# Patient Record
Sex: FEMALE | Race: WHITE | NOT HISPANIC OR LATINO | Employment: OTHER | URBAN - METROPOLITAN AREA
[De-identification: names, ages, dates, MRNs, and addresses within clinical notes are randomized per-mention and may not be internally consistent; named-entity substitution may affect disease eponyms.]

---

## 2017-01-06 ENCOUNTER — HOSPITAL ENCOUNTER (OUTPATIENT)
Dept: CARDIOLOGY | Facility: HOSPITAL | Age: 62
Setting detail: RECURRING SERIES
Discharge: HOME | End: 2017-01-06

## 2017-01-06 PROCEDURE — 36416 COLLJ CAPILLARY BLOOD SPEC: CPT | Performed by: INTERNAL MEDICINE

## 2017-01-06 PROCEDURE — 85610 PROTHROMBIN TIME: CPT | Performed by: INTERNAL MEDICINE

## 2017-01-13 ENCOUNTER — HOSPITAL ENCOUNTER (OUTPATIENT)
Dept: CARDIOLOGY | Facility: HOSPITAL | Age: 62
Setting detail: RECURRING SERIES
Discharge: HOME | End: 2017-01-13

## 2017-01-13 PROCEDURE — 36416 COLLJ CAPILLARY BLOOD SPEC: CPT | Performed by: INTERNAL MEDICINE

## 2017-01-13 PROCEDURE — 85610 PROTHROMBIN TIME: CPT | Performed by: INTERNAL MEDICINE

## 2017-02-03 ENCOUNTER — HOSPITAL ENCOUNTER (OUTPATIENT)
Dept: CARDIOLOGY | Facility: HOSPITAL | Age: 62
Setting detail: RECURRING SERIES
Discharge: HOME | End: 2017-02-03

## 2017-02-03 PROCEDURE — 36416 COLLJ CAPILLARY BLOOD SPEC: CPT

## 2017-02-03 PROCEDURE — 85610 PROTHROMBIN TIME: CPT

## 2017-02-10 ENCOUNTER — HOSPITAL ENCOUNTER (OUTPATIENT)
Dept: CARDIOLOGY | Facility: HOSPITAL | Age: 62
Setting detail: RECURRING SERIES
Discharge: HOME | End: 2017-02-10

## 2017-02-10 PROCEDURE — 85610 PROTHROMBIN TIME: CPT

## 2017-02-10 PROCEDURE — 36416 COLLJ CAPILLARY BLOOD SPEC: CPT

## 2017-02-24 ENCOUNTER — HOSPITAL ENCOUNTER (OUTPATIENT)
Dept: CARDIOLOGY | Facility: HOSPITAL | Age: 62
Setting detail: RECURRING SERIES
Discharge: HOME | End: 2017-02-24

## 2017-02-24 PROCEDURE — 85610 PROTHROMBIN TIME: CPT

## 2017-02-24 PROCEDURE — 36416 COLLJ CAPILLARY BLOOD SPEC: CPT

## 2017-03-10 ENCOUNTER — HOSPITAL ENCOUNTER (OUTPATIENT)
Dept: CARDIOLOGY | Facility: HOSPITAL | Age: 62
Setting detail: RECURRING SERIES
Discharge: HOME | End: 2017-03-10

## 2017-03-10 PROCEDURE — 36416 COLLJ CAPILLARY BLOOD SPEC: CPT

## 2017-03-10 PROCEDURE — 85610 PROTHROMBIN TIME: CPT

## 2017-03-16 RX ORDER — WARFARIN SODIUM 5 MG/1
TABLET ORAL
Qty: 180 TABLET | Refills: 0 | Status: SHIPPED | OUTPATIENT
Start: 2017-03-16 | End: 2017-07-06 | Stop reason: SDUPTHER

## 2017-03-24 ENCOUNTER — HOSPITAL ENCOUNTER (OUTPATIENT)
Dept: CARDIOLOGY | Facility: HOSPITAL | Age: 62
Setting detail: RECURRING SERIES
Discharge: HOME | End: 2017-03-24

## 2017-03-24 PROCEDURE — 36416 COLLJ CAPILLARY BLOOD SPEC: CPT

## 2017-03-24 PROCEDURE — 85610 PROTHROMBIN TIME: CPT

## 2017-04-17 RX ORDER — WARFARIN SODIUM 5 MG/1
TABLET ORAL
Qty: 60 TABLET | Refills: 0 | OUTPATIENT
Start: 2017-04-17

## 2017-04-20 ENCOUNTER — HOSPITAL ENCOUNTER (OUTPATIENT)
Dept: CARDIOLOGY | Facility: HOSPITAL | Age: 62
Setting detail: RECURRING SERIES
Discharge: HOME | End: 2017-04-20

## 2017-04-20 PROCEDURE — 36416 COLLJ CAPILLARY BLOOD SPEC: CPT

## 2017-04-20 PROCEDURE — 85610 PROTHROMBIN TIME: CPT

## 2017-05-19 ENCOUNTER — HOSPITAL ENCOUNTER (OUTPATIENT)
Dept: CARDIOLOGY | Facility: HOSPITAL | Age: 62
Setting detail: RECURRING SERIES
Discharge: HOME | End: 2017-05-19

## 2017-05-19 PROCEDURE — 36416 COLLJ CAPILLARY BLOOD SPEC: CPT

## 2017-05-19 PROCEDURE — 85610 PROTHROMBIN TIME: CPT

## 2017-05-26 ENCOUNTER — HOSPITAL ENCOUNTER (OUTPATIENT)
Dept: CARDIOLOGY | Facility: HOSPITAL | Age: 62
Setting detail: RECURRING SERIES
Discharge: HOME | End: 2017-05-26

## 2017-05-26 PROCEDURE — 85610 PROTHROMBIN TIME: CPT

## 2017-05-26 PROCEDURE — 36416 COLLJ CAPILLARY BLOOD SPEC: CPT

## 2017-06-23 ENCOUNTER — HOSPITAL ENCOUNTER (OUTPATIENT)
Dept: CARDIOLOGY | Facility: HOSPITAL | Age: 62
Setting detail: RECURRING SERIES
Discharge: HOME | End: 2017-06-23

## 2017-06-23 PROCEDURE — 36416 COLLJ CAPILLARY BLOOD SPEC: CPT

## 2017-06-23 PROCEDURE — 85610 PROTHROMBIN TIME: CPT

## 2017-06-26 PROCEDURE — 85610 PROTHROMBIN TIME: CPT

## 2017-06-26 PROCEDURE — 36416 COLLJ CAPILLARY BLOOD SPEC: CPT

## 2017-07-07 RX ORDER — WARFARIN SODIUM 5 MG/1
TABLET ORAL
Qty: 60 TABLET | Refills: 3 | Status: SHIPPED | OUTPATIENT
Start: 2017-07-07 | End: 2018-03-04

## 2017-07-20 ENCOUNTER — HOSPITAL ENCOUNTER (OUTPATIENT)
Dept: CARDIOLOGY | Facility: HOSPITAL | Age: 62
Setting detail: RECURRING SERIES
Discharge: HOME | End: 2017-07-20

## 2017-07-20 PROCEDURE — 36416 COLLJ CAPILLARY BLOOD SPEC: CPT

## 2017-07-20 PROCEDURE — 85610 PROTHROMBIN TIME: CPT

## 2017-07-27 ENCOUNTER — HOSPITAL ENCOUNTER (OUTPATIENT)
Dept: CARDIOLOGY | Facility: HOSPITAL | Age: 62
Setting detail: RECURRING SERIES
Discharge: HOME | End: 2017-07-27

## 2017-07-27 PROCEDURE — 36416 COLLJ CAPILLARY BLOOD SPEC: CPT

## 2017-07-27 PROCEDURE — 85610 PROTHROMBIN TIME: CPT

## 2017-08-11 ENCOUNTER — HOSPITAL ENCOUNTER (OUTPATIENT)
Dept: CARDIOLOGY | Facility: HOSPITAL | Age: 62
Setting detail: RECURRING SERIES
Discharge: HOME | End: 2017-08-11

## 2017-08-11 PROCEDURE — 36416 COLLJ CAPILLARY BLOOD SPEC: CPT

## 2017-08-11 PROCEDURE — 85610 PROTHROMBIN TIME: CPT

## 2017-09-11 ENCOUNTER — HOSPITAL ENCOUNTER (OUTPATIENT)
Dept: CARDIOLOGY | Facility: HOSPITAL | Age: 62
Setting detail: RECURRING SERIES
Discharge: HOME | End: 2017-09-11

## 2017-09-11 PROCEDURE — 36416 COLLJ CAPILLARY BLOOD SPEC: CPT

## 2017-09-11 PROCEDURE — 85610 PROTHROMBIN TIME: CPT

## 2017-10-09 ENCOUNTER — HOSPITAL ENCOUNTER (OUTPATIENT)
Dept: CARDIOLOGY | Facility: HOSPITAL | Age: 62
Setting detail: RECURRING SERIES
Discharge: HOME | End: 2017-10-09

## 2017-10-09 PROCEDURE — 85610 PROTHROMBIN TIME: CPT

## 2017-10-09 PROCEDURE — 36416 COLLJ CAPILLARY BLOOD SPEC: CPT

## 2017-11-06 ENCOUNTER — HOSPITAL ENCOUNTER (OUTPATIENT)
Dept: CARDIOLOGY | Facility: HOSPITAL | Age: 62
Setting detail: RECURRING SERIES
Discharge: HOME | End: 2017-11-06

## 2017-11-06 PROCEDURE — 85610 PROTHROMBIN TIME: CPT

## 2017-11-06 PROCEDURE — 36416 COLLJ CAPILLARY BLOOD SPEC: CPT

## 2017-12-05 ENCOUNTER — HOSPITAL ENCOUNTER (OUTPATIENT)
Dept: CARDIOLOGY | Facility: HOSPITAL | Age: 62
Setting detail: RECURRING SERIES
Discharge: HOME | End: 2017-12-05

## 2017-12-05 PROCEDURE — 85610 PROTHROMBIN TIME: CPT

## 2017-12-05 PROCEDURE — 36416 COLLJ CAPILLARY BLOOD SPEC: CPT

## 2018-01-05 ENCOUNTER — HOSPITAL ENCOUNTER (OUTPATIENT)
Dept: CARDIOLOGY | Facility: HOSPITAL | Age: 63
Setting detail: RECURRING SERIES
Discharge: HOME | End: 2018-01-05

## 2018-01-05 PROCEDURE — 36416 COLLJ CAPILLARY BLOOD SPEC: CPT

## 2018-01-05 PROCEDURE — 85610 PROTHROMBIN TIME: CPT

## 2018-01-19 ENCOUNTER — HOSPITAL ENCOUNTER (OUTPATIENT)
Dept: CARDIOLOGY | Facility: HOSPITAL | Age: 63
Setting detail: RECURRING SERIES
Discharge: HOME | End: 2018-01-19

## 2018-01-19 PROCEDURE — 36416 COLLJ CAPILLARY BLOOD SPEC: CPT

## 2018-01-19 PROCEDURE — 85610 PROTHROMBIN TIME: CPT

## 2018-02-15 ENCOUNTER — HOSPITAL ENCOUNTER (OUTPATIENT)
Dept: CARDIOLOGY | Facility: HOSPITAL | Age: 63
Setting detail: RECURRING SERIES
Discharge: HOME | End: 2018-02-15

## 2018-02-15 PROCEDURE — 85610 PROTHROMBIN TIME: CPT

## 2018-02-15 PROCEDURE — 36416 COLLJ CAPILLARY BLOOD SPEC: CPT

## 2018-02-22 ENCOUNTER — HOSPITAL ENCOUNTER (OUTPATIENT)
Dept: CARDIOLOGY | Facility: HOSPITAL | Age: 63
Setting detail: RECURRING SERIES
Discharge: HOME | End: 2018-02-22

## 2018-02-22 PROCEDURE — 85610 PROTHROMBIN TIME: CPT

## 2018-02-22 PROCEDURE — 36416 COLLJ CAPILLARY BLOOD SPEC: CPT

## 2018-03-02 ENCOUNTER — HOSPITAL ENCOUNTER (OUTPATIENT)
Dept: CARDIOLOGY | Facility: HOSPITAL | Age: 63
Setting detail: RECURRING SERIES
Discharge: HOME | End: 2018-03-02

## 2018-03-02 PROCEDURE — 85610 PROTHROMBIN TIME: CPT

## 2018-03-02 PROCEDURE — 36416 COLLJ CAPILLARY BLOOD SPEC: CPT

## 2018-03-04 ENCOUNTER — HOSPITAL ENCOUNTER (INPATIENT)
Facility: HOSPITAL | Age: 63
LOS: 4 days | Discharge: HOME | End: 2018-03-08
Attending: EMERGENCY MEDICINE | Admitting: INTERNAL MEDICINE

## 2018-03-04 ENCOUNTER — APPOINTMENT (OUTPATIENT)
Dept: GENERAL RADIOLOGY | Facility: HOSPITAL | Age: 63
End: 2018-03-04

## 2018-03-04 DIAGNOSIS — I48.91 ATRIAL FIBRILLATION WITH RAPID VENTRICULAR RESPONSE (HCC): Primary | ICD-10-CM

## 2018-03-04 DIAGNOSIS — I50.9 ACUTE ON CHRONIC CONGESTIVE HEART FAILURE, UNSPECIFIED CONGESTIVE HEART FAILURE TYPE: ICD-10-CM

## 2018-03-04 LAB
ALBUMIN SERPL-MCNC: 3.9 G/DL (ref 3.5–5.2)
ALBUMIN/GLOB SERPL: 1.4 G/DL
ALP SERPL-CCNC: 76 U/L (ref 39–117)
ALT SERPL W P-5'-P-CCNC: 24 U/L (ref 1–33)
ANION GAP SERPL CALCULATED.3IONS-SCNC: 15.3 MMOL/L
APTT PPP: 38.5 SECONDS (ref 22.7–35.4)
AST SERPL-CCNC: 34 U/L (ref 1–32)
BASOPHILS # BLD AUTO: 0.03 10*3/MM3 (ref 0–0.2)
BASOPHILS NFR BLD AUTO: 0.2 % (ref 0–1.5)
BILIRUB SERPL-MCNC: 1.3 MG/DL (ref 0.1–1.2)
BUN BLD-MCNC: 20 MG/DL (ref 8–23)
BUN/CREAT SERPL: 16.5 (ref 7–25)
CALCIUM SPEC-SCNC: 9.1 MG/DL (ref 8.6–10.5)
CHLORIDE SERPL-SCNC: 104 MMOL/L (ref 98–107)
CO2 SERPL-SCNC: 21.7 MMOL/L (ref 22–29)
CREAT BLD-MCNC: 1.21 MG/DL (ref 0.57–1)
DEPRECATED RDW RBC AUTO: 49.5 FL (ref 37–54)
DIGOXIN SERPL-MCNC: <0.3 NG/ML (ref 0.6–1.2)
EOSINOPHIL # BLD AUTO: 0.02 10*3/MM3 (ref 0–0.7)
EOSINOPHIL NFR BLD AUTO: 0.2 % (ref 0.3–6.2)
ERYTHROCYTE [DISTWIDTH] IN BLOOD BY AUTOMATED COUNT: 14.4 % (ref 11.7–13)
GFR SERPL CREATININE-BSD FRML MDRD: 45 ML/MIN/1.73
GLOBULIN UR ELPH-MCNC: 2.7 GM/DL
GLUCOSE BLD-MCNC: 131 MG/DL (ref 65–99)
GLUCOSE BLDC GLUCOMTR-MCNC: 112 MG/DL (ref 70–130)
HCT VFR BLD AUTO: 43 % (ref 35.6–45.5)
HGB BLD-MCNC: 13.5 G/DL (ref 11.9–15.5)
HOLD SPECIMEN: NORMAL
HOLD SPECIMEN: NORMAL
IMM GRANULOCYTES # BLD: 0.03 10*3/MM3 (ref 0–0.03)
IMM GRANULOCYTES NFR BLD: 0.2 % (ref 0–0.5)
INR PPP: 2.77 (ref 0.9–1.1)
LYMPHOCYTES # BLD AUTO: 2.43 10*3/MM3 (ref 0.9–4.8)
LYMPHOCYTES NFR BLD AUTO: 19.2 % (ref 19.6–45.3)
MCH RBC QN AUTO: 29.7 PG (ref 26.9–32)
MCHC RBC AUTO-ENTMCNC: 31.4 G/DL (ref 32.4–36.3)
MCV RBC AUTO: 94.7 FL (ref 80.5–98.2)
MONOCYTES # BLD AUTO: 0.92 10*3/MM3 (ref 0.2–1.2)
MONOCYTES NFR BLD AUTO: 7.3 % (ref 5–12)
NEUTROPHILS # BLD AUTO: 9.23 10*3/MM3 (ref 1.9–8.1)
NEUTROPHILS NFR BLD AUTO: 72.9 % (ref 42.7–76)
NT-PROBNP SERPL-MCNC: ABNORMAL PG/ML (ref 5–900)
PLATELET # BLD AUTO: 246 10*3/MM3 (ref 140–500)
PMV BLD AUTO: 12.7 FL (ref 6–12)
POTASSIUM BLD-SCNC: 4.2 MMOL/L (ref 3.5–5.2)
PROT SERPL-MCNC: 6.6 G/DL (ref 6–8.5)
PROTHROMBIN TIME: 28.9 SECONDS (ref 11.7–14.2)
RBC # BLD AUTO: 4.54 10*6/MM3 (ref 3.9–5.2)
SODIUM BLD-SCNC: 141 MMOL/L (ref 136–145)
TROPONIN T SERPL-MCNC: <0.01 NG/ML (ref 0–0.03)
WBC NRBC COR # BLD: 12.66 10*3/MM3 (ref 4.5–10.7)
WHOLE BLOOD HOLD SPECIMEN: NORMAL
WHOLE BLOOD HOLD SPECIMEN: NORMAL

## 2018-03-04 PROCEDURE — 85610 PROTHROMBIN TIME: CPT | Performed by: EMERGENCY MEDICINE

## 2018-03-04 PROCEDURE — 93010 ELECTROCARDIOGRAM REPORT: CPT | Performed by: INTERNAL MEDICINE

## 2018-03-04 PROCEDURE — 80162 ASSAY OF DIGOXIN TOTAL: CPT | Performed by: INTERNAL MEDICINE

## 2018-03-04 PROCEDURE — 93005 ELECTROCARDIOGRAM TRACING: CPT | Performed by: EMERGENCY MEDICINE

## 2018-03-04 PROCEDURE — 25010000002 DIGOXIN PER 500 MCG: Performed by: INTERNAL MEDICINE

## 2018-03-04 PROCEDURE — 85025 COMPLETE CBC W/AUTO DIFF WBC: CPT | Performed by: EMERGENCY MEDICINE

## 2018-03-04 PROCEDURE — 83880 ASSAY OF NATRIURETIC PEPTIDE: CPT | Performed by: EMERGENCY MEDICINE

## 2018-03-04 PROCEDURE — 84484 ASSAY OF TROPONIN QUANT: CPT | Performed by: EMERGENCY MEDICINE

## 2018-03-04 PROCEDURE — 85730 THROMBOPLASTIN TIME PARTIAL: CPT | Performed by: EMERGENCY MEDICINE

## 2018-03-04 PROCEDURE — 82962 GLUCOSE BLOOD TEST: CPT

## 2018-03-04 PROCEDURE — 99285 EMERGENCY DEPT VISIT HI MDM: CPT

## 2018-03-04 PROCEDURE — 71045 X-RAY EXAM CHEST 1 VIEW: CPT

## 2018-03-04 PROCEDURE — 80053 COMPREHEN METABOLIC PANEL: CPT | Performed by: EMERGENCY MEDICINE

## 2018-03-04 RX ORDER — DIGOXIN 0.25 MG/ML
250 INJECTION INTRAMUSCULAR; INTRAVENOUS ONCE
Status: COMPLETED | OUTPATIENT
Start: 2018-03-04 | End: 2018-03-04

## 2018-03-04 RX ORDER — LABETALOL HYDROCHLORIDE 5 MG/ML
10 INJECTION, SOLUTION INTRAVENOUS ONCE
Status: COMPLETED | OUTPATIENT
Start: 2018-03-04 | End: 2018-03-04

## 2018-03-04 RX ORDER — DIGOXIN 125 MCG
0.12 TABLET ORAL
Status: DISCONTINUED | OUTPATIENT
Start: 2018-03-05 | End: 2018-03-08 | Stop reason: HOSPADM

## 2018-03-04 RX ORDER — SODIUM CHLORIDE 0.9 % (FLUSH) 0.9 %
1-10 SYRINGE (ML) INJECTION AS NEEDED
Status: DISCONTINUED | OUTPATIENT
Start: 2018-03-04 | End: 2018-03-08 | Stop reason: HOSPADM

## 2018-03-04 RX ORDER — WARFARIN SODIUM 7.5 MG/1
7.5 TABLET ORAL
Status: DISCONTINUED | OUTPATIENT
Start: 2018-03-04 | End: 2018-03-05

## 2018-03-04 RX ORDER — WARFARIN SODIUM 5 MG/1
5 TABLET ORAL
COMMUNITY
End: 2018-07-30 | Stop reason: SDUPTHER

## 2018-03-04 RX ORDER — SODIUM CHLORIDE 0.9 % (FLUSH) 0.9 %
10 SYRINGE (ML) INJECTION AS NEEDED
Status: DISCONTINUED | OUTPATIENT
Start: 2018-03-04 | End: 2018-03-08 | Stop reason: HOSPADM

## 2018-03-04 RX ORDER — CARVEDILOL 3.12 MG/1
3.12 TABLET ORAL EVERY 12 HOURS SCHEDULED
Status: DISCONTINUED | OUTPATIENT
Start: 2018-03-04 | End: 2018-03-08 | Stop reason: HOSPADM

## 2018-03-04 RX ORDER — BUMETANIDE 0.25 MG/ML
0.5 INJECTION INTRAMUSCULAR; INTRAVENOUS ONCE
Status: COMPLETED | OUTPATIENT
Start: 2018-03-04 | End: 2018-03-04

## 2018-03-04 RX ADMIN — BUMETANIDE 0.5 MG: 0.25 INJECTION INTRAMUSCULAR; INTRAVENOUS at 22:25

## 2018-03-04 RX ADMIN — LABETALOL HYDROCHLORIDE 20 MG: 5 INJECTION, SOLUTION INTRAVENOUS at 18:42

## 2018-03-04 RX ADMIN — SODIUM CHLORIDE 1000 ML: 9 INJECTION, SOLUTION INTRAVENOUS at 18:52

## 2018-03-04 RX ADMIN — DIGOXIN 250 MCG: 0.25 INJECTION INTRAMUSCULAR; INTRAVENOUS at 20:28

## 2018-03-04 RX ADMIN — CARVEDILOL 3.12 MG: 3.12 TABLET, FILM COATED ORAL at 22:25

## 2018-03-04 NOTE — ED PROVIDER NOTES
EMERGENCY DEPARTMENT ENCOUNTER    CHIEF COMPLAINT  Chief Complaint: rapid heart rate  History given by: patient  History limited by:  nothing  Room Number: 324/1  PMD: No Known Provider  Cardiology: Dr. Vargas    HPI:  Pt is a 62 y.o. female who presents complaining of rapid heart rate, SOA, and fatigue that began earlier today. She states that she also had an episode of rapid heart rate two weeks ago but that she felt that it resolved. However, she states that she has been very fatigued since then. She denies nausea, vomiting, and chest pain. Pt had an artifical mechanical valve placed emergently in 2015 and has been taking Warfarin ever since. She states that her symptoms today feel similar to this episode in 2015.  At presentation, Pt's TM=[id number].    Duration:  One day  Onset:  gradual  Timing:  constant  Location:  n/a  Radiation:  n/a  Quality:  GU=369  Intensity/Severity:  severe  Progression: worsening  Associated Symptoms:  SOA, fatigue  Aggravating Factors:  none  Alleviating Factors:  None  Previous Episodes:   Pt had an artifical mechanical valve placed emergently in 2015.  Pt has been taking Warfarin ever since.  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     Resolved Ambulatory Problems     Diagnosis Date Noted   • No Resolved Ambulatory Problems     Past Medical History:   Diagnosis Date   • Acute bronchitis    • Acute kidney injury    • Cachexia    • Depression    • H/O shortness of breath    • Mitral valve stenosis    • Pneumothorax    • Pulmonary hypertension    • Rheumatic fever    • Sinus tachycardia        PAST SURGICAL HISTORY  Past Surgical History:   Procedure Laterality Date   • CARDIAC CATHETERIZATION      procedure outcome: successful   • FOOT SURGERY     • MITRAL VALVE REPLACEMENT  06/2015    Subhash Márquez   • TONSILLECTOMY         FAMILY HISTORY  Family History   Problem Relation Age of  Onset   • Heart failure Mother      congestive   • Cancer Father    • Atrial fibrillation Sister    • Atrial fibrillation Brother    • Heart disease Brother      cardiac pacemaker       SOCIAL HISTORY  Social History     Social History   • Marital status: Single     Spouse name: N/A   • Number of children: N/A   • Years of education: N/A     Occupational History   • Not on file.     Social History Main Topics   • Smoking status: Former Smoker     Quit date: 2/18/2018   • Smokeless tobacco: Not on file   • Alcohol use No   • Drug use: No   • Sexual activity: Not on file     Other Topics Concern   • Not on file     Social History Narrative   • No narrative on file       ALLERGIES  Review of patient's allergies indicates no known allergies.    REVIEW OF SYSTEMS  Review of Systems   Constitutional: Positive for fatigue. Negative for fever.   HENT: Negative for sore throat.    Eyes: Negative.    Respiratory: Positive for shortness of breath. Negative for cough.    Cardiovascular: Negative for chest pain.        DR=[id number]   Gastrointestinal: Negative for abdominal pain, diarrhea and vomiting.   Genitourinary: Negative for dysuria.   Musculoskeletal: Negative for neck pain.   Skin: Negative for rash.   Allergic/Immunologic: Negative.    Neurological: Negative for weakness, numbness and headaches.   Hematological: Negative.    Psychiatric/Behavioral: Negative.    All other systems reviewed and are negative.      PHYSICAL EXAM  ED Triage Vitals   Temp Heart Rate Resp BP SpO2   03/04/18 1817 03/04/18 1808 03/04/18 1808 03/04/18 1818 03/04/18 1808   97.5 °F (36.4 °C) 160 20 130/88 98 %      Temp src Heart Rate Source Patient Position BP Location FiO2 (%)   03/04/18 1817 03/04/18 1808 03/04/18 1818 03/04/18 1818 --   Oral Monitor Lying Right arm        Physical Exam   Constitutional: She is oriented to person, place, and time and well-developed, well-nourished, and in no distress. No distress.   HENT:   Head: Normocephalic and  atraumatic.   Eyes: EOM are normal. Pupils are equal, round, and reactive to light.   Neck: Normal range of motion. Neck supple.   Cardiovascular: Normal heart sounds.  An irregularly irregular rhythm present. Tachycardia present.    KM=191   Pulmonary/Chest: Effort normal. No respiratory distress. She has rhonchi in the right lower field and the left lower field.   Abdominal: Soft. There is no tenderness. There is no rebound and no guarding.   Musculoskeletal: Normal range of motion. She exhibits no edema.   Neurological: She is alert and oriented to person, place, and time. She has normal sensation and normal strength.   Skin: Skin is warm and dry. No rash noted.   Psychiatric: Mood and affect normal.   Nursing note and vitals reviewed.      LAB RESULTS  Lab Results (last 24 hours)     Procedure Component Value Units Date/Time    aPTT [[id number]]  (Abnormal) Collected:  03/04/18 1831    Specimen:  Blood Updated:  03/04/18 1912     PTT 38.5 (H) seconds     BNP [[id number]]  (Abnormal) Collected:  03/04/18 1831    Specimen:  Blood Updated:  03/04/18 1909     proBNP 12410.0 (H) pg/mL     Narrative:       Among patients with dyspnea, NT-proBNP is highly sensitive for the detection of acute congestive heart failure. In addition NT-proBNP of <300 pg/ml effectively rules out acute congestive heart failure with 99% negative predictive value.    Comprehensive Metabolic Panel [[id number]]  (Abnormal) Collected:  03/04/18 1831    Specimen:  Blood Updated:  03/04/18 1911     Glucose 131 (H) mg/dL      BUN 20 mg/dL      Creatinine 1.21 (H) mg/dL      Sodium 141 mmol/L      Potassium 4.2 mmol/L      Chloride 104 mmol/L      CO2 21.7 (L) mmol/L      Calcium 9.1 mg/dL      Total Protein 6.6 g/dL      Albumin 3.90 g/dL      ALT (SGPT) 24 U/L      AST (SGOT) 34 (H) U/L      Alkaline Phosphatase 76 U/L      Total Bilirubin 1.3 (H) mg/dL      eGFR Non African Amer 45 (L) mL/min/1.73      Globulin 2.7 gm/dL      A/G Ratio 1.4 g/dL       BUN/Creatinine Ratio 16.5     Anion Gap 15.3 mmol/L     Protime-INR [[id number]]  (Abnormal) Collected:  03/04/18 1831    Specimen:  Blood Updated:  03/04/18 1912     Protime 28.9 (H) Seconds      INR 2.77 (H)    Troponin [[id number]]  (Normal) Collected:  03/04/18 1831    Specimen:  Blood Updated:  03/04/18 1911     Troponin T <0.010 ng/mL     Narrative:       Troponin T Reference Ranges:  Less than 0.03 ng/mL:    Negative for AMI  0.03 to 0.09 ng/mL:      Indeterminant for AMI  Greater than 0.09 ng/mL: Positive for AMI    CBC Auto Differential [[id number]]  (Abnormal) Collected:  03/04/18 1831    Specimen:  Blood Updated:  03/04/18 1909     WBC 12.66 (H) 10*3/mm3      RBC 4.54 10*6/mm3      Hemoglobin 13.5 g/dL      Hematocrit 43.0 %      MCV 94.7 fL      MCH 29.7 pg      MCHC 31.4 (L) g/dL      RDW 14.4 (H) %      RDW-SD 49.5 fl      MPV 12.7 (H) fL      Platelets 246 10*3/mm3      Neutrophil % 72.9 %      Lymphocyte % 19.2 (L) %      Monocyte % 7.3 %      Eosinophil % 0.2 (L) %      Basophil % 0.2 %      Immature Grans % 0.2 %      Neutrophils, Absolute 9.23 (H) 10*3/mm3      Lymphocytes, Absolute 2.43 10*3/mm3      Monocytes, Absolute 0.92 10*3/mm3      Eosinophils, Absolute 0.02 10*3/mm3      Basophils, Absolute 0.03 10*3/mm3      Immature Grans, Absolute 0.03 10*3/mm3     Digoxin Level [[id number]]  (Abnormal) Collected:  03/04/18 1831    Specimen:  Blood Updated:  03/04/18 2052     Digoxin <0.30 (L) ng/mL     POC Glucose Once [[id number]]  (Normal) Collected:  03/04/18 2102    Specimen:  Blood Updated:  03/04/18 2103     Glucose 112 mg/dL     Narrative:       Meter: [id number] : [id number] Kike Hercules I ordered the above labs and reviewed the results    RADIOLOGY  XR Chest 1 View            Reviewed CXR which shows a single portable view of the chest demonstrates moderate to severe cardiomegaly. Bibasilar atelectasis / infiltrate and pleural fluid collections are appreciated with the pleural fluid  being more prominent on the left. There is mild cephalization of the pulmonary vasculature. Independently viewed by me. Interpreted by radiologist.     I ordered the above noted radiological studies. Interpreted by radiologist. Reviewed by me in PACS.       PROCEDURES  Critical Care  Performed by: HARMAN MAHONEY  Authorized by: HARMAN MAHONEY     Critical care provider statement:     Critical care time (minutes):  35    Critical care end time:  3/4/2018 8:15 PM    Critical care time was exclusive of:  Separately billable procedures and treating other patients    Critical care was necessary to treat or prevent imminent or life-threatening deterioration of the following conditions:  Cardiac failure    Critical care was time spent personally by me on the following activities:  Development of treatment plan with patient or surrogate, discussions with consultants, evaluation of patient's response to treatment, examination of patient, obtaining history from patient or surrogate, ordering and performing treatments and interventions, ordering and review of laboratory studies, ordering and review of radiographic studies, pulse oximetry, re-evaluation of patient's condition and review of old charts        EKG           EKG time: 1813  Rhythm/Rate: 197, a-fib with RVR  P waves and NH: absent  QRS, axis: normal   ST and T waves: normal     Interpreted Contemporaneously by me, independently viewed  changed from a sinus EKG compared to prior 6/15      PROGRESS AND CONSULTS  ED Course     1808  Ordered EKG for further evaluation.    1831  Ordered labs and CXR for further evaluation. Ordered labetelol and IV fluids to treat her rapid HR and increase her BP.     1931  Rechecked Pt who is resting comfortably. Her HR is down to 130s to 140s but she is still in rapid a-fib. Her systolic BP is in the mid 70s. Dicussed plans for admission and the need to keep her HR down with medication while also balancing her BP. Pt  understands and agrees to all plans. All question answered.    2000  Rechecked Pts HR which is still 130s-140s, and her BP is 85/57.  Placed call to [facility] [place].    2006  Discussed Pt's case with Dr. Muñoz ([facility] [place]) who agrees to admit Pt to CCU for further evaluation and treatment. He would like us to give Pt 0.25mg digoxin IV while in the ED.       MEDICAL DECISION MAKING  Results were reviewed/discussed with the patient and they were also made aware of online access. Pt also made aware that some labs, such as cultures, will not be resulted during ER visit and follow up with PMD is necessary.     MDM  Number of Diagnoses or Management Options     Amount and/or Complexity of Data Reviewed  Clinical lab tests: ordered and reviewed (OZG=01247.0, Troponin negative)  Tests in the radiology section of CPT®: ordered and reviewed (CXR shows A single portable view of the chest demonstrates moderate to severe cardiomegaly. Bibasilar atelectasis / infiltrate and pleural fluid collections are appreciated with the pleural fluid being more prominent on the left. There is mild cephalization of the pulmonary vasculature)  Tests in the medicine section of CPT®: ordered and reviewed (See EKG note.)  Decide to obtain previous medical records or to obtain history from someone other than the patient: yes  Review and summarize past medical records: yes (Pt was admitted by Dr. Vargas June 2015 with a-fib secondary to RVR and severe mitral stenosis.)           DIAGNOSIS  Final diagnoses:   Atrial fibrillation with rapid ventricular response   Acute on chronic congestive heart failure, unspecified congestive heart failure type       DISPOSITION  ADMISSION    Discussed treatment plan and reason for admission with pt/family and admitting physician.  Pt/family voiced understanding of the plan for admission for further testing/treatment as needed.       Latest Documented Vital Signs:  As of 1:15 AM  BP- 97/73 HR- 118 Temp- 97.8 °F  (36.6 °C) (Oral) O2 sat- 93%    --  Documentation assistance provided by dianne Barnes for Dr. Velasco.  Information recorded by the scribe was done at my direction and has been verified and validated by me.     Modesta Barnes  03/04/18 2016       Derrek Velasco MD  03/05/18 0115

## 2018-03-04 NOTE — ED NOTES
Pt. reports she has never felt like this since her mitral valve replaced in 2015. Pt. has an auscultated rate of approximately 175 in triage and is working hard to breath. Pt. reports shortness of breath and feeling like her heart was racing today. Pt. reports feeling SOA since earlier in the week.     Gato Lane RN  03/04/18 7284

## 2018-03-05 ENCOUNTER — APPOINTMENT (OUTPATIENT)
Dept: CARDIOLOGY | Facility: HOSPITAL | Age: 63
End: 2018-03-05
Attending: INTERNAL MEDICINE

## 2018-03-05 PROBLEM — I50.21 ACUTE SYSTOLIC CONGESTIVE HEART FAILURE: Status: ACTIVE | Noted: 2018-03-05

## 2018-03-05 PROBLEM — I34.2 NON-RHEUMATIC MITRAL VALVE STENOSIS: Status: ACTIVE | Noted: 2018-03-05

## 2018-03-05 PROBLEM — F32.A DEPRESSION: Status: ACTIVE | Noted: 2018-03-05

## 2018-03-05 LAB
ANION GAP SERPL CALCULATED.3IONS-SCNC: 12.4 MMOL/L
AORTIC DIMENSIONLESS INDEX: 0.5 (DI)
BH CV ECHO MEAS - ACS: 1.8 CM
BH CV ECHO MEAS - AO MAX PG: 6 MMHG
BH CV ECHO MEAS - AO MEAN PG (FULL): 2 MMHG
BH CV ECHO MEAS - AO MEAN PG: 3 MMHG
BH CV ECHO MEAS - AO ROOT AREA (BSA CORRECTED): 2.2
BH CV ECHO MEAS - AO ROOT AREA: 8 CM^2
BH CV ECHO MEAS - AO ROOT DIAM: 3.2 CM
BH CV ECHO MEAS - AO V2 MAX: 124 CM/SEC
BH CV ECHO MEAS - AO V2 MEAN: 84.7 CM/SEC
BH CV ECHO MEAS - AO V2 VTI: 22.8 CM
BH CV ECHO MEAS - AVA(I,A): 1.4 CM^2
BH CV ECHO MEAS - AVA(I,D): 1.4 CM^2
BH CV ECHO MEAS - BSA(HAYCOCK): 1.5 M^2
BH CV ECHO MEAS - BSA: 1.5 M^2
BH CV ECHO MEAS - BZI_BMI: 20.1 KILOGRAMS/M^2
BH CV ECHO MEAS - BZI_METRIC_HEIGHT: 157.5 CM
BH CV ECHO MEAS - BZI_METRIC_WEIGHT: 49.9 KG
BH CV ECHO MEAS - CONTRAST EF (2CH): 27.4 ML/M^2
BH CV ECHO MEAS - CONTRAST EF 4CH: 32.6 ML/M^2
BH CV ECHO MEAS - EDV(CUBED): 79.5 ML
BH CV ECHO MEAS - EDV(MOD-SP2): 95 ML
BH CV ECHO MEAS - EDV(MOD-SP4): 92 ML
BH CV ECHO MEAS - EDV(TEICH): 83.1 ML
BH CV ECHO MEAS - EF(CUBED): 41.3 %
BH CV ECHO MEAS - EF(MOD-SP2): 27.4 %
BH CV ECHO MEAS - EF(MOD-SP4): 32.6 %
BH CV ECHO MEAS - EF(TEICH): 34.5 %
BH CV ECHO MEAS - ESV(CUBED): 46.7 ML
BH CV ECHO MEAS - ESV(MOD-SP2): 69 ML
BH CV ECHO MEAS - ESV(MOD-SP4): 62 ML
BH CV ECHO MEAS - ESV(TEICH): 54.4 ML
BH CV ECHO MEAS - FS: 16.3 %
BH CV ECHO MEAS - IVS/LVPW: 0.75
BH CV ECHO MEAS - IVSD: 0.6 CM
BH CV ECHO MEAS - LAT PEAK E' VEL: 3 CM/SEC
BH CV ECHO MEAS - LV DIASTOLIC VOL/BSA (35-75): 62.1 ML/M^2
BH CV ECHO MEAS - LV MASS(C)D: 88.5 GRAMS
BH CV ECHO MEAS - LV MASS(C)DI: 59.7 GRAMS/M^2
BH CV ECHO MEAS - LV MEAN PG: 1 MMHG
BH CV ECHO MEAS - LV SYSTOLIC VOL/BSA (12-30): 41.8 ML/M^2
BH CV ECHO MEAS - LV V1 MAX: 71 CM/SEC
BH CV ECHO MEAS - LV V1 MEAN: 49.5 CM/SEC
BH CV ECHO MEAS - LV V1 VTI: 12.7 CM
BH CV ECHO MEAS - LVIDD: 4.3 CM
BH CV ECHO MEAS - LVIDS: 3.6 CM
BH CV ECHO MEAS - LVLD AP2: 7.4 CM
BH CV ECHO MEAS - LVLD AP4: 7.7 CM
BH CV ECHO MEAS - LVLS AP2: 6.7 CM
BH CV ECHO MEAS - LVLS AP4: 7 CM
BH CV ECHO MEAS - LVOT AREA (M): 2.5 CM^2
BH CV ECHO MEAS - LVOT AREA: 2.5 CM^2
BH CV ECHO MEAS - LVOT DIAM: 1.8 CM
BH CV ECHO MEAS - LVPWD: 0.8 CM
BH CV ECHO MEAS - MED PEAK E' VEL: 7 CM/SEC
BH CV ECHO MEAS - MV DEC SLOPE: 847 CM/SEC^2
BH CV ECHO MEAS - MV DEC TIME: 0.11 SEC
BH CV ECHO MEAS - MV E MAX VEL: 110 CM/SEC
BH CV ECHO MEAS - MV MAX PG: 6 MMHG
BH CV ECHO MEAS - MV MEAN PG: 3 MMHG
BH CV ECHO MEAS - MV P1/2T MAX VEL: 125 CM/SEC
BH CV ECHO MEAS - MV P1/2T: 43.2 MSEC
BH CV ECHO MEAS - MV V2 MEAN: 73.5 CM/SEC
BH CV ECHO MEAS - MV V2 VTI: 20.1 CM
BH CV ECHO MEAS - MVA P1/2T LCG: 1.8 CM^2
BH CV ECHO MEAS - MVA(P1/2T): 5.1 CM^2
BH CV ECHO MEAS - MVA(VTI): 1.6 CM^2
BH CV ECHO MEAS - PA ACC SLOPE: 963 CM/SEC^2
BH CV ECHO MEAS - PA ACC TIME: 0.07 SEC
BH CV ECHO MEAS - PA MAX PG (FULL): 1.3 MMHG
BH CV ECHO MEAS - PA MAX PG: 1.9 MMHG
BH CV ECHO MEAS - PA PR(ACCEL): 45.7 MMHG
BH CV ECHO MEAS - PA V2 MAX: 69.5 CM/SEC
BH CV ECHO MEAS - PVA(V,A): 1.3 CM^2
BH CV ECHO MEAS - PVA(V,D): 1.3 CM^2
BH CV ECHO MEAS - QP/QS: 0.52
BH CV ECHO MEAS - RAP SYSTOLE: 8 MMHG
BH CV ECHO MEAS - RV MAX PG: 0.62 MMHG
BH CV ECHO MEAS - RV MEAN PG: 0 MMHG
BH CV ECHO MEAS - RV V1 MAX: 39.3 CM/SEC
BH CV ECHO MEAS - RV V1 MEAN: 28.4 CM/SEC
BH CV ECHO MEAS - RV V1 VTI: 7.5 CM
BH CV ECHO MEAS - RVOT AREA: 2.3 CM^2
BH CV ECHO MEAS - RVOT DIAM: 1.7 CM
BH CV ECHO MEAS - RVSP: 41 MMHG
BH CV ECHO MEAS - SI(AO): 123.7 ML/M^2
BH CV ECHO MEAS - SI(CUBED): 22.2 ML/M^2
BH CV ECHO MEAS - SI(LVOT): 21.8 ML/M^2
BH CV ECHO MEAS - SI(MOD-SP2): 17.5 ML/M^2
BH CV ECHO MEAS - SI(MOD-SP4): 20.2 ML/M^2
BH CV ECHO MEAS - SI(TEICH): 19.3 ML/M^2
BH CV ECHO MEAS - SV(AO): 183.4 ML
BH CV ECHO MEAS - SV(CUBED): 32.9 ML
BH CV ECHO MEAS - SV(LVOT): 32.3 ML
BH CV ECHO MEAS - SV(MOD-SP2): 26 ML
BH CV ECHO MEAS - SV(MOD-SP4): 30 ML
BH CV ECHO MEAS - SV(RVOT): 17 ML
BH CV ECHO MEAS - SV(TEICH): 28.6 ML
BH CV ECHO MEAS - TAPSE (>1.6): 0.9 CM2
BH CV ECHO MEAS - TR MAX VEL: 288 CM/SEC
BH CV VAS BP RIGHT ARM: NORMAL MMHG
BH CV XLRA - RV BASE: 3.6 CM
BH CV XLRA - TDI S': 10 CM/SEC
BUN BLD-MCNC: 19 MG/DL (ref 8–23)
BUN/CREAT SERPL: 20 (ref 7–25)
CALCIUM SPEC-SCNC: 8.4 MG/DL (ref 8.6–10.5)
CHLORIDE SERPL-SCNC: 108 MMOL/L (ref 98–107)
CO2 SERPL-SCNC: 23.6 MMOL/L (ref 22–29)
CREAT BLD-MCNC: 0.95 MG/DL (ref 0.57–1)
E/E' RATIO: 29
GFR SERPL CREATININE-BSD FRML MDRD: 59 ML/MIN/1.73
GLUCOSE BLD-MCNC: 87 MG/DL (ref 65–99)
INR PPP: 2.39 (ref 0.9–1.1)
INR PPP: 2.83 (ref 0.9–1.1)
LEFT ATRIUM VOLUME INDEX: 33 ML/M2
MAXIMAL PREDICTED HEART RATE: 157 BPM
POTASSIUM BLD-SCNC: 4.1 MMOL/L (ref 3.5–5.2)
PROTHROMBIN TIME: 25.7 SECONDS (ref 11.7–14.2)
PROTHROMBIN TIME: 29.3 SECONDS (ref 11.7–14.2)
SODIUM BLD-SCNC: 144 MMOL/L (ref 136–145)
STRESS TARGET HR: 133 BPM

## 2018-03-05 PROCEDURE — 93010 ELECTROCARDIOGRAM REPORT: CPT | Performed by: INTERNAL MEDICINE

## 2018-03-05 PROCEDURE — 93306 TTE W/DOPPLER COMPLETE: CPT

## 2018-03-05 PROCEDURE — 85610 PROTHROMBIN TIME: CPT | Performed by: INTERNAL MEDICINE

## 2018-03-05 PROCEDURE — 99223 1ST HOSP IP/OBS HIGH 75: CPT | Performed by: INTERNAL MEDICINE

## 2018-03-05 PROCEDURE — 93306 TTE W/DOPPLER COMPLETE: CPT | Performed by: INTERNAL MEDICINE

## 2018-03-05 PROCEDURE — 90661 CCIIV3 VAC ABX FR 0.5 ML IM: CPT | Performed by: INTERNAL MEDICINE

## 2018-03-05 PROCEDURE — G0008 ADMIN INFLUENZA VIRUS VAC: HCPCS | Performed by: INTERNAL MEDICINE

## 2018-03-05 PROCEDURE — G0009 ADMIN PNEUMOCOCCAL VACCINE: HCPCS | Performed by: INTERNAL MEDICINE

## 2018-03-05 PROCEDURE — 93005 ELECTROCARDIOGRAM TRACING: CPT | Performed by: INTERNAL MEDICINE

## 2018-03-05 PROCEDURE — 80048 BASIC METABOLIC PNL TOTAL CA: CPT | Performed by: INTERNAL MEDICINE

## 2018-03-05 PROCEDURE — 90732 PPSV23 VACC 2 YRS+ SUBQ/IM: CPT | Performed by: INTERNAL MEDICINE

## 2018-03-05 PROCEDURE — 25010000002 PNEUMOCOCCAL VAC POLYVALENT PER 0.5 ML: Performed by: INTERNAL MEDICINE

## 2018-03-05 PROCEDURE — 25010000002 INFLUENZA VAC SUBUNIT QUAD 0.5 ML SUSPENSION PREFILLED SYRINGE: Performed by: INTERNAL MEDICINE

## 2018-03-05 PROCEDURE — 25010000002 PERFLUTREN (DEFINITY) 8.476 MG IN SODIUM CHLORIDE 0.9 % 10 ML INJECTION: Performed by: INTERNAL MEDICINE

## 2018-03-05 RX ORDER — WARFARIN SODIUM 5 MG/1
5 TABLET ORAL
Status: DISCONTINUED | OUTPATIENT
Start: 2018-03-05 | End: 2018-03-07

## 2018-03-05 RX ADMIN — PERFLUTREN 2 ML: 6.52 INJECTION, SUSPENSION INTRAVENOUS at 10:45

## 2018-03-05 RX ADMIN — CARVEDILOL 3.12 MG: 3.12 TABLET, FILM COATED ORAL at 08:06

## 2018-03-05 RX ADMIN — A/SINGAPORE/GP1908/2015 IVR-180 (H1N1) (AN A/MICHIGAN/45/2015-LIKE VIRUS), A/SINGAPORE/GP2050/2015 (H3N2) (AN A/HONG KONG/4801/2014 - LIKE VIRUS), B/UTAH/9/2014 (A B/PHUKET/3073/2013-LIKE VIRUS), B/HONG KONG/259/2010 (A B/BRISBANE/60/08-LIKE VIRUS) 0.5 ML: 15; 15; 15; 15 INJECTION, SUSPENSION INTRAMUSCULAR at 12:37

## 2018-03-05 RX ADMIN — PNEUMOCOCCAL VACCINE POLYVALENT 0.5 ML
25; 25; 25; 25; 25; 25; 25; 25; 25; 25; 25; 25; 25; 25; 25; 25; 25; 25; 25; 25; 25; 25; 25 INJECTION, SOLUTION INTRAMUSCULAR; SUBCUTANEOUS at 14:48

## 2018-03-05 RX ADMIN — CARVEDILOL 3.12 MG: 3.12 TABLET, FILM COATED ORAL at 20:12

## 2018-03-05 RX ADMIN — WARFARIN SODIUM 5 MG: 5 TABLET ORAL at 17:32

## 2018-03-05 RX ADMIN — DIGOXIN 0.12 MG: 0.12 TABLET ORAL at 11:46

## 2018-03-05 NOTE — H&P
Patient Name: Yana Lehman  Age/Sex: 62 y.o. female  : 1955  MRN: [id number]    Date of Admission: 3/4/2018  Date of Encounter Visit: 18  Encounter Provider: Anoop Muñoz MD  Place of Service: [facility] CARDIOLOGY      Referring Provider: Anoop Muñoz MD  Patient Care Team:  No Known Provider as PCP - General    Subjective:   Admitted/Consulted for: Atrial fibrillation   Chief Complaint: Shortness of breath   New [place] Heart Association NYHA Class: 3    [id number]: 2    History of Present Illness:  Yana Lehman is a 62 y.o. female normally followed by Dr. Lindsey Galloway.  However the patient has been noncompliant for several years now primarily due to depression and social situations.      The patient has a history of valvular heart disease.  She has mitral stenosis and underwent a mitral valve replacement several years ago with a mechanical prosthesis.  She has been on medical therapy with warfarin.  She had previously been on amiodarone for atrial fibrillation but claims that Dr. Vargas discontinued all of her medications other than warfarin several years ago.  The patient came to the emergency room yesterday because she was feeling poorly.  She states actually over the last few weeks she has been feeling poorly.  She has been fatigued and also notice palpitations.  Upon arrival in the emergency room she was found to be in atrial fibrillation with a rapid ventricular response.  In addition she was noted to have her proBNP that was greater than 14,000.    Previous testing:       Past Medical History:  Past Medical History:   Diagnosis Date   • Acute bronchitis    • Acute kidney injury    • Cachexia    • Depression    • H/O shortness of breath    • Mitral valve stenosis    • Pneumothorax    • Pulmonary hypertension    • Rheumatic fever    • Sinus tachycardia        Past Surgical History:   Procedure Laterality Date   • CARDIAC  CATHETERIZATION      procedure outcome: successful   • FOOT SURGERY     • MITRAL VALVE REPLACEMENT  06/2015    Subhash Márquez   • TONSILLECTOMY         Home Medications:   Prescriptions Prior to Admission   Medication Sig Dispense Refill Last Dose   • warfarin (COUMADIN) 5 MG tablet Take 5 mg by mouth Daily.   3/3/2018 at Unknown time       Allergies:  No Known Allergies    Past Social History:  Social History     Social History   • Marital status: Single     Spouse name: N/A   • Number of children: N/A   • Years of education: N/A     Occupational History   • Not on file.     Social History Main Topics   • Smoking status: Former Smoker     Quit date: 2/18/2018   • Smokeless tobacco: Not on file   • Alcohol use No   • Drug use: No   • Sexual activity: Not on file     Other Topics Concern   • Not on file     Social History Narrative   • No narrative on file        Past Family History:  Family History   Problem Relation Age of Onset   • Heart failure Mother      congestive   • Cancer Father    • Atrial fibrillation Sister    • Atrial fibrillation Brother    • Heart disease Brother      cardiac pacemaker       Review of Systems: All systems reviewed. Pertinent positives identified in HPI. All other systems are negative.     REVIEW OF SYSTEMS:   CONSTITUTIONAL: No weight loss, fever, chills, weakness  The patient complains of fatigue   HEENT: Eyes: No visual loss, blurred vision, double vision or yellow sclerae. Ears, Nose, Throat: No hearing loss, sneezing, congestion, runny nose or sore throat.   SKIN: No rash or itching.     RESPIRATORY: Shortness of breath  GASTROINTESTINAL: No anorexia, nausea, vomiting or diarrhea. No abdominal pain, bright red blood per rectum or melena.  GENITOURINARY: No burning on urination, hematuria or increased frequency.  NEUROLOGICAL: No headache, dizziness, syncope, paralysis, ataxia, numbness or tingling in the extremities. No change in bowel or bladder control.   MUSCULOSKELETAL: No  muscle, back pain, joint pain or stiffness.   HEMATOLOGIC: No anemia, bleeding or bruising.   LYMPHATICS: No enlarged nodes. No history of splenectomy.   PSYCHIATRIC: No history of depression, anxiety, hallucinations.   ENDOCRINOLOGIC: No reports of sweating, cold or heat intolerance. No polyuria or polydipsia.       Objective:     Objective:  Temp:  [97.5 °F (36.4 °C)-97.8 °F (36.6 °C)] 97.8 °F (36.6 °C)  Heart Rate:  [] 87  Resp:  [18-20] 18  BP: ()/(56-88) 98/74    Intake/Output Summary (Last 24 hours) at 03/05/18 0639  Last data filed at 03/05/18 0500   Gross per 24 hour   Intake             1240 ml   Output             2500 ml   Net            -1260 ml     Body mass index is 20.12 kg/(m^2).  Last 3 weights    03/04/18  1812   Weight: 49.9 kg (110 lb)           Physical Exam:   Physical Exam   Constitutional: She is oriented to person, place, and time. She appears well-developed and well-nourished.   HENT:   Head: Normocephalic.   Eyes: Pupils are equal, round, and reactive to light.   Neck: Normal range of motion. No JVD present. Carotid bruit is not present. No thyromegaly present.   Cardiovascular: Normal rate, S1 normal, S2 normal, normal heart sounds and intact distal pulses.  An irregularly irregular rhythm present. Exam reveals no gallop and no friction rub.    No murmur heard.  Pulmonary/Chest: Effort normal. She has decreased breath sounds.   Abdominal: Soft. Bowel sounds are normal. She exhibits pulsatile liver.   Musculoskeletal: She exhibits no edema.   Neurological: She is alert and oriented to person, place, and time.   Skin: Skin is warm, dry and intact. No erythema.   Psychiatric: She has a normal mood and affect.   Vitals reviewed.        Lab Review:       Results from last 7 days  Lab Units 03/05/18  0515 03/04/18  1831   SODIUM mmol/L 144 141   POTASSIUM mmol/L 4.1 4.2   CHLORIDE mmol/L 108* 104   CO2 mmol/L 23.6 21.7*   BUN mg/dL 19 20   CREATININE mg/dL 0.95 1.21*   GLUCOSE mg/dL  87 131*   CALCIUM mg/dL 8.4* 9.1         Results from last 7 days  Lab Units 03/04/18  1831   TROPONIN T ng/mL <0.010       Results from last 7 days  Lab Units 03/04/18  1831   WBC 10*3/mm3 12.66*   HEMOGLOBIN g/dL 13.5   HEMATOCRIT % 43.0   PLATELETS 10*3/mm3 246       Results from last 7 days  Lab Units 03/05/18  0515 03/04/18  1831   INR  2.83* 2.77*   APTT seconds  --  38.5*                   Results from last 7 days  Lab Units 03/04/18  1831   PROBNP pg/mL 88276.0*       Results from last 7 days  Lab Units 03/04/18  1831   DIGOXIN LVL ng/mL <0.30*           Imaging:    Imaging Results (most recent)     Procedure Component Value Units Date/Time    XR Chest 1 View [[id number]] Collected:  03/04/18 1918     Updated:  03/04/18 1918    Narrative:       PORTABLE CHEST     HISTORY: Shortness of air.     FINDINGS: A single portable view of the chest demonstrates moderate to  severe cardiomegaly. Bibasilar atelectasis / infiltrate and pleural  fluid collections are appreciated with the pleural fluid being more  prominent on the left. There is mild cephalization of the pulmonary  vasculature. When able, a PA and lateral view of the chest is suggested.             EKG: Reviewed        Baseline:     I personally viewed and interpreted the patient's EKG/Telemetry data.    Assessment:   1.  Atrial fibrillation with rapid ventricular response: We'll start the patient on beta blockade to slow her heart rate down.  She is anticoagulated with warfarin.  2.  Congestive heart failure: Based on previous evaluation I suspect that this is more right-sided failure.  She has a history of pulmonary hypertension and severe right-sided enlargement.  A follow-up echocardiogram will be performed to identify whether this is systolic or diastolic.  I suspect systolic.  This is more than likely acute on chronic  3.  Valvular heart disease: Mitral stenosis, status post mitral valve replacement with mechanical prosthesis  4.  Depression: The  patient admits that she has had serious bouts of depression but has not sought medical therapy  5.  Tobacco abuse: The patient has been advised      Thank you for allowing me to participate in the care of Yana Lehman. Feel free to contact me directly with any further questions or concerns.    Anoop Muñoz MD  [facility]  03/05/18  6:39 AM

## 2018-03-05 NOTE — PLAN OF CARE
Problem: Patient Care Overview (Adult)  Goal: Plan of Care Review  Outcome: Ongoing (interventions implemented as appropriate)   03/05/18 0634   Coping/Psychosocial Response Interventions   Plan Of Care Reviewed With patient   Patient Care Overview   Progress improving   Outcome Evaluation   Outcome Summary/Follow up Plan Pt remains in afib with controlled rate mostly in the 90s. BP stable. Up to bedside commode with no assistance multiple times tonight. No complaints of pain.

## 2018-03-05 NOTE — PLAN OF CARE
Problem: Patient Care Overview (Adult)  Goal: Plan of Care Review  Outcome: Ongoing (interventions implemented as appropriate)   03/05/18 1608   Coping/Psychosocial Response Interventions   Plan Of Care Reviewed With patient   Patient Care Overview   Progress no change   Outcome Evaluation   Outcome Summary/Follow up Plan Pt. remains in a-fib rate controlled 70-80. No complaints of chest pain or SOA. Up ad-shirin im room. Appetite good. Pt. given flu and pneumonia vaccine. Will continue to monitor. 03/05/18       Problem: Cardiac: Heart Failure (Adult)  Goal: Signs and Symptoms of Listed Potential Problems Will be Absent or Manageable (Cardiac: Heart Failure)  Outcome: Ongoing (interventions implemented as appropriate)      Problem: Arrhythmia/Dysrhythmia (Symptomatic) (Adult)  Goal: Signs and Symptoms of Listed Potential Problems Will be Absent or Manageable (Arrhythmia/Dysrhythmia)  Outcome: Ongoing (interventions implemented as appropriate)      Problem: Fall Risk (Adult)  Goal: Identify Related Risk Factors and Signs and Symptoms  Outcome: Ongoing (interventions implemented as appropriate)    Goal: Absence of Falls  Outcome: Ongoing (interventions implemented as appropriate)

## 2018-03-05 NOTE — PAYOR COMM NOTE
"Yana Lehman (62 y.o. Female)                    ATTENTION;   NURSE REVIEW, REPLY TO UR DEPT, [facility]APARRO N  OR UR                    [phone] // PATIENT ADMITTED TO CORONARY CARE UNIT         Date of Birth Social Security Number Address Home Phone MRN    1955  [id number]  [facility] [phone] [id number]    Latter day Marital Status          [employer]       Admission Date Admission Type Admitting Provider Attending Provider Department, Room/Bed    3/4/18 Emergency Lindsey Vargas MD McFarland, Rebecca M, MD [facility] CORONARY CARE, 324/    Discharge Date Discharge Disposition Discharge Destination                      Attending Provider: Lindsey Vargas MD     Allergies:  No Known Allergies    Isolation:  None   Infection:  None   Code Status:  FULL    Ht:  157.5 cm (62\")   Wt:  49.9 kg (110 lb)    Admission Cmt:  None   Principal Problem:  None                Active Insurance as of 3/4/2018     Primary Coverage     Payor Plan Insurance Group Employer/Plan Group    [employer] MEDICAID [employer] [facility]     Payor Plan Address Payor Plan Phone Number Effective From Effective To    PO  [phone] 2015     [place] [address]       Subscriber Name Subscriber Birth Date Member ID       YANA LEHMAN 1955 [id number]                 Emergency Contacts      (Rel.) Home Phone Work Phone Mobile Phone    Camila Serrano (Sister) [phone] -- [phone]    Niyah Villagomez (Sister) -- -- [phone]               History & Physical      Anoop Muñoz MD at 3/5/2018  6:26 AM                Patient Name: Yana Lehman  Age/Sex: 62 y.o. female  : 1955  MRN: [id number]    Date of Admission: 3/4/2018  Date of Encounter Visit: 18  Encounter Provider: Anoop Muñoz MD  Place of Service: [facility] CARDIOLOGY      Referring Provider: Anoop MIRANDA" MD Toni  Patient Care Team:  No Known Provider as PCP - General    Subjective:   Admitted/Consulted for: Atrial fibrillation   Chief Complaint: Shortness of breath   New York Heart Association NYHA Class: 3    [id number]: 2    History of Present Illness:  Yana Lehman is a 62 y.o. female normally followed by Dr. Lindsey Galloway.  However the patient has been noncompliant for several years now primarily due to depression and social situations.      The patient has a history of valvular heart disease.  She has mitral stenosis and underwent a mitral valve replacement several years ago with a mechanical prosthesis.  She has been on medical therapy with warfarin.  She had previously been on amiodarone for atrial fibrillation but claims that Dr. Vargas discontinued all of her medications other than warfarin several years ago.  The patient came to the emergency room yesterday because she was feeling poorly.  She states actually over the last few weeks she has been feeling poorly.  She has been fatigued and also notice palpitations.  Upon arrival in the emergency room she was found to be in atrial fibrillation with a rapid ventricular response.  In addition she was noted to have her proBNP that was greater than 14,000.    Previous testing:       Past Medical History:  Past Medical History:   Diagnosis Date   • Acute bronchitis    • Acute kidney injury    • Cachexia    • Depression    • H/O shortness of breath    • Mitral valve stenosis    • Pneumothorax    • Pulmonary hypertension    • Rheumatic fever    • Sinus tachycardia        Past Surgical History:   Procedure Laterality Date   • CARDIAC CATHETERIZATION      procedure outcome: successful   • FOOT SURGERY     • MITRAL VALVE REPLACEMENT  06/2015    Subhash Márquez   • TONSILLECTOMY         Home Medications:   Prescriptions Prior to Admission   Medication Sig Dispense Refill Last Dose   • warfarin (COUMADIN) 5 MG tablet Take 5 mg by mouth Daily.    3/3/2018 at Unknown time       Allergies:  No Known Allergies    Past Social History:  Social History     Social History   • Marital status: Single     Spouse name: N/A   • Number of children: N/A   • Years of education: N/A     Occupational History   • Not on file.     Social History Main Topics   • Smoking status: Former Smoker     Quit date: 2/18/2018   • Smokeless tobacco: Not on file   • Alcohol use No   • Drug use: No   • Sexual activity: Not on file     Other Topics Concern   • Not on file     Social History Narrative   • No narrative on file        Past Family History:  Family History   Problem Relation Age of Onset   • Heart failure Mother      congestive   • Cancer Father    • Atrial fibrillation Sister    • Atrial fibrillation Brother    • Heart disease Brother      cardiac pacemaker       Review of Systems: All systems reviewed. Pertinent positives identified in HPI. All other systems are negative.     REVIEW OF SYSTEMS:   CONSTITUTIONAL: No weight loss, fever, chills, weakness  The patient complains of fatigue   HEENT: Eyes: No visual loss, blurred vision, double vision or yellow sclerae. Ears, Nose, Throat: No hearing loss, sneezing, congestion, runny nose or sore throat.   SKIN: No rash or itching.     RESPIRATORY: Shortness of breath  GASTROINTESTINAL: No anorexia, nausea, vomiting or diarrhea. No abdominal pain, bright red blood per rectum or melena.  GENITOURINARY: No burning on urination, hematuria or increased frequency.  NEUROLOGICAL: No headache, dizziness, syncope, paralysis, ataxia, numbness or tingling in the extremities. No change in bowel or bladder control.   MUSCULOSKELETAL: No muscle, back pain, joint pain or stiffness.   HEMATOLOGIC: No anemia, bleeding or bruising.   LYMPHATICS: No enlarged nodes. No history of splenectomy.   PSYCHIATRIC: No history of depression, anxiety, hallucinations.   ENDOCRINOLOGIC: No reports of sweating, cold or heat intolerance. No polyuria or polydipsia.        Objective:     Objective:  Temp:  [97.5 °F (36.4 °C)-97.8 °F (36.6 °C)] 97.8 °F (36.6 °C)  Heart Rate:  [] 87  Resp:  [18-20] 18  BP: ()/(56-88) 98/74    Intake/Output Summary (Last 24 hours) at 03/05/18 0639  Last data filed at 03/05/18 0500   Gross per 24 hour   Intake             1240 ml   Output             2500 ml   Net            -1260 ml     Body mass index is 20.12 kg/(m^2).  Last 3 weights    03/04/18  1812   Weight: 49.9 kg (110 lb)           Physical Exam:   Physical Exam   Constitutional: She is oriented to person, place, and time. She appears well-developed and well-nourished.   HENT:   Head: Normocephalic.   Eyes: Pupils are equal, round, and reactive to light.   Neck: Normal range of motion. No JVD present. Carotid bruit is not present. No thyromegaly present.   Cardiovascular: Normal rate, S1 normal, S2 normal, normal heart sounds and intact distal pulses.  An irregularly irregular rhythm present. Exam reveals no gallop and no friction rub.    No murmur heard.  Pulmonary/Chest: Effort normal. She has decreased breath sounds.   Abdominal: Soft. Bowel sounds are normal. She exhibits pulsatile liver.   Musculoskeletal: She exhibits no edema.   Neurological: She is alert and oriented to person, place, and time.   Skin: Skin is warm, dry and intact. No erythema.   Psychiatric: She has a normal mood and affect.   Vitals reviewed.        Lab Review:       Results from last 7 days  Lab Units 03/05/18  0515 03/04/18  1831   SODIUM mmol/L 144 141   POTASSIUM mmol/L 4.1 4.2   CHLORIDE mmol/L 108* 104   CO2 mmol/L 23.6 21.7*   BUN mg/dL 19 20   CREATININE mg/dL 0.95 1.21*   GLUCOSE mg/dL 87 131*   CALCIUM mg/dL 8.4* 9.1         Results from last 7 days  Lab Units 03/04/18  1831   TROPONIN T ng/mL <0.010       Results from last 7 days  Lab Units 03/04/18  1831   WBC 10*3/mm3 12.66*   HEMOGLOBIN g/dL 13.5   HEMATOCRIT % 43.0   PLATELETS 10*3/mm3 246       Results from last 7 days  Lab Units  03/05/18  0515 03/04/18  1831   INR  2.83* 2.77*   APTT seconds  --  38.5*                   Results from last 7 days  Lab Units 03/04/18  1831   PROBNP pg/mL 38928.0*       Results from last 7 days  Lab Units 03/04/18  1831   DIGOXIN LVL ng/mL <0.30*           Imaging:    Imaging Results (most recent)     Procedure Component Value Units Date/Time    XR Chest 1 View [[id number]] Collected:  03/04/18 1918     Updated:  03/04/18 1918    Narrative:       PORTABLE CHEST     HISTORY: Shortness of air.     FINDINGS: A single portable view of the chest demonstrates moderate to  severe cardiomegaly. Bibasilar atelectasis / infiltrate and pleural  fluid collections are appreciated with the pleural fluid being more  prominent on the left. There is mild cephalization of the pulmonary  vasculature. When able, a PA and lateral view of the chest is suggested.             EKG: Reviewed        Baseline:     I personally viewed and interpreted the patient's EKG/Telemetry data.    Assessment:   1.  Atrial fibrillation with rapid ventricular response: We'll start the patient on beta blockade to slow her heart rate down.  She is anticoagulated with warfarin.  2.  Congestive heart failure: Based on previous evaluation I suspect that this is more right-sided failure.  She has a history of pulmonary hypertension and severe right-sided enlargement.  A follow-up echocardiogram will be performed to identify whether this is systolic or diastolic.  I suspect systolic.  This is more than likely acute on chronic  3.  Valvular heart disease: Mitral stenosis, status post mitral valve replacement with mechanical prosthesis  4.  Depression: The patient admits that she has had serious bouts of depression but has not sought medical therapy  5.  Tobacco abuse: The patient has been advised      Thank you for allowing me to participate in the care of Yana Lehman. Feel free to contact me directly with any further questions or concerns.    Anoop SOLER  MD Toni  [facility]  03/05/18  6:39 AM       Electronically signed by Anoop Muñoz MD at 3/5/2018  6:41 AM           Emergency Department Notes      Gato Lane RN at 3/4/2018  6:12 PM          Pt. reports she has never felt like this since her mitral valve replaced in 2015. Pt. has an auscultated rate of approximately 175 in triage and is working hard to breath. Pt. reports shortness of breath and feeling like her heart was racing today. Pt. reports feeling SOA since earlier in the week.     Gato Lane RN  03/04/18 1815       Electronically signed by Gato Lane RN at 3/4/2018  6:15 PM      Derrek Velasco MD at 3/4/2018  6:18 PM      Procedure Orders:    1. Critical Care [[id number]] ordered by Derrek Velasco MD at 03/04/18 2015                 EMERGENCY DEPARTMENT ENCOUNTER    CHIEF COMPLAINT  Chief Complaint: rapid heart rate  History given by: patient  History limited by:  nothing  Room Number: 324/1  PMD: No Known Provider  Cardiology: Dr. Vargas    HPI:  Pt is a 62 y.o. female who presents complaining of rapid heart rate, SOA, and fatigue that began earlier today. She states that she also had an episode of rapid heart rate two weeks ago but that she felt that it resolved. However, she states that she has been very fatigued since then. She denies nausea, vomiting, and chest pain. Pt had an artifical mechanical valve placed emergently in 2015 and has been taking Warfarin ever since. She states that her symptoms today feel similar to this episode in 2015.  At presentation, Pt's JP=510.    Duration:  One day  Onset:  gradual  Timing:  constant  Location:  n/a  Radiation:  n/a  Quality:  HI=615  Intensity/Severity:  severe  Progression: worsening  Associated Symptoms:  SOA, fatigue  Aggravating Factors:  none  Alleviating Factors:  None  Previous Episodes:   Pt had an artifical mechanical valve placed emergently in 2015.  Pt has been taking Warfarin ever  since.  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     Resolved Ambulatory Problems     Diagnosis Date Noted   • No Resolved Ambulatory Problems     Past Medical History:   Diagnosis Date   • Acute bronchitis    • Acute kidney injury    • Cachexia    • Depression    • H/O shortness of breath    • Mitral valve stenosis    • Pneumothorax    • Pulmonary hypertension    • Rheumatic fever    • Sinus tachycardia        PAST SURGICAL HISTORY  Past Surgical History:   Procedure Laterality Date   • CARDIAC CATHETERIZATION      procedure outcome: successful   • FOOT SURGERY     • MITRAL VALVE REPLACEMENT  06/2015    Subhash Willi   • TONSILLECTOMY         FAMILY HISTORY  Family History   Problem Relation Age of Onset   • Heart failure Mother      congestive   • Cancer Father    • Atrial fibrillation Sister    • Atrial fibrillation Brother    • Heart disease Brother      cardiac pacemaker       SOCIAL HISTORY  Social History     Social History   • Marital status: Single     Spouse name: N/A   • Number of children: N/A   • Years of education: N/A       Social History Main Topics   • Smoking status: Former Smoker     Quit date: 2/18/2018   • Smokeless tobacco: Not on file   • Alcohol use No   • Drug use: No   • Sexual activity: Not on file         ALLERGIES  Review of patient's allergies indicates no known allergies.    REVIEW OF SYSTEMS  Review of Systems   Constitutional: Positive for fatigue. Negative for fever.   HENT: Negative for sore throat.    Eyes: Negative.    Respiratory: Positive for shortness of breath. Negative for cough.    Cardiovascular: Negative for chest pain.        ZB=105   Gastrointestinal: Negative for abdominal pain, diarrhea and vomiting.   Genitourinary: Negative for dysuria.   Musculoskeletal: Negative for neck pain.   Skin: Negative for rash.   Allergic/Immunologic: Negative.    Neurological: Negative for  weakness, numbness and headaches.   Hematological: Negative.    Psychiatric/Behavioral: Negative.    All other systems reviewed and are negative.      PHYSICAL EXAM  ED Triage Vitals   Temp Heart Rate Resp BP SpO2   03/04/18 1817 03/04/18 1808 03/04/18 1808 03/04/18 1818 03/04/18 1808   97.5 °F (36.4 °C) 160 20 130/88 98 %      Temp src Heart Rate Source Patient Position BP Location FiO2 (%)   03/04/18 1817 03/04/18 1808 03/04/18 1818 03/04/18 1818 --   Oral Monitor Lying Right arm        Physical Exam   Constitutional: She is oriented to person, place, and time and well-developed, well-nourished, and in no distress. No distress.   HENT:   Head: Normocephalic and atraumatic.   Eyes: EOM are normal. Pupils are equal, round, and reactive to light.   Neck: Normal range of motion. Neck supple.   Cardiovascular: Normal heart sounds.  An irregularly irregular rhythm present. Tachycardia present.    ZL=094   Pulmonary/Chest: Effort normal. No respiratory distress. She has rhonchi in the right lower field and the left lower field.   Abdominal: Soft. There is no tenderness. There is no rebound and no guarding.   Musculoskeletal: Normal range of motion. She exhibits no edema.   Neurological: She is alert and oriented to person, place, and time. She has normal sensation and normal strength.   Skin: Skin is warm and dry. No rash noted.   Psychiatric: Mood and affect normal.   Nursing note and vitals reviewed.      LAB RESULTS  Lab Results (last 24 hours)     Procedure Component Value Units Date/Time    aPTT [[id number]]  (Abnormal) Collected:  03/04/18 1831    Specimen:  Blood Updated:  03/04/18 1912     PTT 38.5 (H) seconds     BNP [[id number]]  (Abnormal) Collected:  03/04/18 1831    Specimen:  Blood Updated:  03/04/18 1909     proBNP 07915.0 (H) pg/mL     Narrative:       Among patients with dyspnea, NT-proBNP is highly sensitive for the detection of acute congestive heart failure. In addition NT-proBNP of <300 pg/ml  effectively rules out acute congestive heart failure with 99% negative predictive value.    Comprehensive Metabolic Panel [[id number]]  (Abnormal) Collected:  03/04/18 1831    Specimen:  Blood Updated:  03/04/18 1911     Glucose 131 (H) mg/dL      BUN 20 mg/dL      Creatinine 1.21 (H) mg/dL      Sodium 141 mmol/L      Potassium 4.2 mmol/L      Chloride 104 mmol/L      CO2 21.7 (L) mmol/L      Calcium 9.1 mg/dL      Total Protein 6.6 g/dL      Albumin 3.90 g/dL      ALT (SGPT) 24 U/L      AST (SGOT) 34 (H) U/L      Alkaline Phosphatase 76 U/L      Total Bilirubin 1.3 (H) mg/dL      eGFR Non African Amer 45 (L) mL/min/1.73      Globulin 2.7 gm/dL      A/G Ratio 1.4 g/dL      BUN/Creatinine Ratio 16.5     Anion Gap 15.3 mmol/L     Protime-INR [[id number]]  (Abnormal) Collected:  03/04/18 1831    Specimen:  Blood Updated:  03/04/18 1912     Protime 28.9 (H) Seconds      INR 2.77 (H)    Troponin [[id number]]  (Normal) Collected:  03/04/18 1831    Specimen:  Blood Updated:  03/04/18 1911     Troponin T <0.010 ng/mL     Narrative:       Troponin T Reference Ranges:  Less than 0.03 ng/mL:    Negative for AMI  0.03 to 0.09 ng/mL:      Indeterminant for AMI  Greater than 0.09 ng/mL: Positive for AMI    CBC Auto Differential [[id number]]  (Abnormal) Collected:  03/04/18 1831    Specimen:  Blood Updated:  03/04/18 1909     WBC 12.66 (H) 10*3/mm3      RBC 4.54 10*6/mm3      Hemoglobin 13.5 g/dL      Hematocrit 43.0 %      MCV 94.7 fL      MCH 29.7 pg      MCHC 31.4 (L) g/dL      RDW 14.4 (H) %      RDW-SD 49.5 fl      MPV 12.7 (H) fL      Platelets 246 10*3/mm3      Neutrophil % 72.9 %      Lymphocyte % 19.2 (L) %      Monocyte % 7.3 %      Eosinophil % 0.2 (L) %      Basophil % 0.2 %      Immature Grans % 0.2 %      Neutrophils, Absolute 9.23 (H) 10*3/mm3      Lymphocytes, Absolute 2.43 10*3/mm3      Monocytes, Absolute 0.92 10*3/mm3      Eosinophils, Absolute 0.02 10*3/mm3      Basophils, Absolute 0.03 10*3/mm3      Immature  Grans, Absolute 0.03 10*3/mm3     Digoxin Level [[id number]]  (Abnormal) Collected:  03/04/18 1831    Specimen:  Blood Updated:  03/04/18 2052     Digoxin <0.30 (L) ng/mL     POC Glucose Once [[id number]]  (Normal) Collected:  03/04/18 2102    Specimen:  Blood Updated:  03/04/18 2103     Glucose 112 mg/dL     Narrative:       Meter: [id number] : [id number] Kike Hercules          I ordered the above labs and reviewed the results    RADIOLOGY  XR Chest 1 View            Reviewed CXR which shows a single portable view of the chest demonstrates moderate to severe cardiomegaly. Bibasilar atelectasis / infiltrate and pleural fluid collections are appreciated with the pleural fluid being more prominent on the left. There is mild cephalization of the pulmonary vasculature. Independently viewed by me. Interpreted by radiologist.     I ordered the above noted radiological studies. Interpreted by radiologist. Reviewed by me in PACS.       PROCEDURES  Critical Care  Performed by: HARMAN MAHONEY  Authorized by: HARMAN MAHONEY     Critical care provider statement:     Critical care time (minutes):  35    Critical care end time:  3/4/2018 8:15 PM    Critical care time was exclusive of:  Separately billable procedures and treating other patients    Critical care was necessary to treat or prevent imminent or life-threatening deterioration of the following conditions:  Cardiac failure    Critical care was time spent personally by me on the following activities:  Development of treatment plan with patient or surrogate, discussions with consultants, evaluation of patient's response to treatment, examination of patient, obtaining history from patient or surrogate, ordering and performing treatments and interventions, ordering and review of laboratory studies, ordering and review of radiographic studies, pulse oximetry, re-evaluation of patient's condition and review of old charts        EKG           EKG time:  1813  Rhythm/Rate: 197, a-fib with RVR  P waves and NH: absent  QRS, axis: normal   ST and T waves: normal     Interpreted Contemporaneously by me, independently viewed  changed from a sinus EKG compared to prior 6/15      PROGRESS AND CONSULTS  ED Course     1808  Ordered EKG for further evaluation.    1831  Ordered labs and CXR for further evaluation. Ordered labetelol and IV fluids to treat her rapid HR and increase her BP.     1931  Rechecked Pt who is resting comfortably. Her HR is down to 130s to 140s but she is still in rapid a-fib. Her systolic BP is in the mid 70s. Dicussed plans for admission and the need to keep her HR down with medication while also balancing her BP. Pt understands and agrees to all plans. All question answered.    2000  Rechecked Pts HR which is still 130s-140s, and her BP is 85/57.  [place] [facility] [place].    2006  Discussed Pt's case with Dr. Muñoz ([facility] who agrees to admit Pt to CCU for further evaluation and treatment. He would like us to give Pt 0.25mg digoxin IV while in the ED.       MEDICAL DECISION MAKING  Results were reviewed/discussed with the patient and they were also made aware of online access. Pt also made aware that some labs, such as cultures, will not be resulted during ER visit and follow up with PMD is necessary.     MDM  Number of Diagnoses or Management Options     Amount and/or Complexity of Data Reviewed  Clinical lab tests: ordered and reviewed (QDR=59101.0, Troponin negative)  Tests in the radiology section of CPT®:  ordered and reviewed (CXR shows A single portable view of the chest demonstrates moderate to severe cardiomegaly. Bibasilar atelectasis / infiltrate and pleural fluid collections are appreciated with the pleural fluid being more prominent on the left. There is mild cephalization of the pulmonary vasculature)  Tests in the medicine section of CPT®:  ordered and reviewed (See EKG note.)  Decide to obtain previous medical records or to  obtain history from someone other than the patient: yes  Review and summarize past medical records: yes (Pt was admitted by Dr. Vargas June 2015 with a-fib secondary to RVR and severe mitral stenosis.)           DIAGNOSIS  Final diagnoses:   Atrial fibrillation with rapid ventricular response   Acute on chronic congestive heart failure, unspecified congestive heart failure type       DISPOSITION  ADMISSION    Discussed treatment plan and reason for admission with pt/family and admitting physician.  Pt/family voiced understanding of the plan for admission for further testing/treatment as needed.       Latest Documented Vital Signs:  As of 1:15 AM  BP- 97/73 HR- 118 Temp- 97.8 °F (36.6 °C) (Oral) O2 sat- 93%    --  Documentation assistance provided by dianne Barnes for Dr. Velasco.  Information recorded by the scribe was done at my direction and has been verified and validated by me.     Modesta Barnes  03/04/18 2016       Derrek Velasco MD  03/05/18 0115       Electronically signed by Derrek Velasco MD at 3/5/2018  1:15 AM        Vital Signs (last 24 hours)       03/04 0700  -  03/05 0659 03/05 0700  -  03/05 1020   Most Recent    Temp (°F) 97.5 -  97.8      97.8     97.8 (36.6)    Heart Rate 87 -  (!)174      94     94    Resp 18 -  20       18    BP (!)79/56 -  130/88      99/69     99/69    SpO2 (%) 90 -  100      93     93          Lines, Drains & Airways    Active LDAs     Name:   Placement date:   Placement time:   Site:   Days:    Peripheral IV Line - Single Lumen 03/04/18 1817 median cubital vein (antecubital fossa), left 22 gauge  03/04/18 1817      less than 1    Peripheral IV Line - Single Lumen 03/05/18 0517 cephalic vein (lateral side of arm), left 22 gauge  03/05/18 0517      less than 1         Inactive LDAs     None                Hospital Medications (all)       Dose Frequency Start End    bumetanide (BUMEX) injection 0.5 mg 0.5 mg Once 3/4/2018 3/4/2018    Sig - Route:  "Infuse 2 mL into a venous catheter 1 (One) Time. - Intravenous    carvedilol (COREG) tablet 3.125 mg 3.125 mg Every 12 Hours Scheduled 3/4/2018     Sig - Route: Take 1 tablet by mouth Every 12 (Twelve) Hours. - Oral    digoxin (LANOXIN) injection 250 mcg 250 mcg Once 3/4/2018 3/4/2018    Sig - Route: Infuse 1 mL into a venous catheter 1 (One) Time. - Intravenous    digoxin (LANOXIN) tablet 0.125 mg 0.125 mg Daily Digoxin 3/5/2018     Sig - Route: Take 1 tablet by mouth Daily. - Oral    Influenza Vac Subunit Quad (FLUCELVAX) injection 0.5 mL 0.5 mL Once 3/5/2018     Sig - Route: Inject 0.5 mL into the shoulder, thigh, or buttocks 1 (One) Time. - Intramuscular    labetalol (NORMODYNE,TRANDATE) injection 10 mg 10 mg Once 3/4/2018 3/4/2018    Sig - Route: Infuse 2 mL into a venous catheter 1 (One) Time. - Intravenous    pneumococcal polysaccharide 23-valent (PNEUMOVAX-23) vaccine 0.5 mL 0.5 mL During Hospitalization 3/4/2018     Sig - Route: Inject 0.5 mL into the shoulder, thigh, or buttocks During Hospitalization for Immunization. - Intramuscular    sodium chloride 0.9 % bolus 1,000 mL 1,000 mL Once 3/4/2018 3/4/2018    Sig - Route: Infuse 1,000 mL into a venous catheter 1 (One) Time. - Intravenous    sodium chloride 0.9 % flush 1-10 mL 1-10 mL As Needed 3/4/2018     Sig - Route: Infuse 1-10 mL into a venous catheter As Needed for Line Care. - Intravenous    sodium chloride 0.9 % flush 10 mL 10 mL As Needed 3/4/2018     Sig - Route: Infuse 10 mL into a venous catheter As Needed for Line Care. - Intravenous    Linked Group 1:  \"And\" Linked Group Details        warfarin (COUMADIN) tablet 5 mg 5 mg Daily Warfarin 3/5/2018     Sig - Route: Take 1 tablet by mouth Daily. - Oral    warfarin (COUMADIN) tablet 7.5 mg (Discontinued) 7.5 mg Daily Warfarin 3/4/2018 3/5/2018    Sig - Route: Take 1 tablet by mouth Daily. - Oral          Orders (last 24 hrs)     Start     Ordered    03/05/18 1800  warfarin (COUMADIN) tablet 5 mg  " Daily Warfarin      03/05/18 0641    03/05/18 1200  digoxin (LANOXIN) tablet 0.125 mg  Daily Digoxin      03/04/18 2130    03/05/18 1200  Influenza Vac Subunit Quad (FLUCELVAX) injection 0.5 mL  Once      03/04/18 2129 03/05/18 0643  Transfer Patient  Once      03/05/18 0642    03/05/18 0600  Basic Metabolic Panel  Morning Draw      03/04/18 2130 03/05/18 0600  Protime-INR  Morning Draw,   Status:  Canceled      03/04/18 2130 03/05/18 0600  ECG 12 Lead  Tomorrow AM      03/04/18 2130 03/05/18 0500  ECG 12 Lead  Tomorrow AM,   Status:  Canceled      03/04/18 2130 03/04/18 2215  warfarin (COUMADIN) tablet 7.5 mg  Daily Warfarin,   Status:  Discontinued      03/04/18 2130 03/04/18 2215  bumetanide (BUMEX) injection 0.5 mg  Once      03/04/18 2130 03/04/18 2215  carvedilol (COREG) tablet 3.125 mg  Every 12 Hours Scheduled      03/04/18 2130 03/04/18 2200  Strict Intake and Output  Every Hour      03/04/18 2130 03/04/18 2133  pneumococcal polysaccharide 23-valent (PNEUMOVAX-23) vaccine 0.5 mL  During Hospitalization      03/04/18 2133 03/04/18 2131  Protime-INR  Daily      03/04/18 2130 03/04/18 2131  Insert Peripheral IV  Once      03/04/18 2130 03/04/18 2131  Saline Lock & Maintain IV Access  Continuous      03/04/18 2130 03/04/18 2131  Full Code  Continuous      03/04/18 2130 03/04/18 2131  VTE Risk Assessment - Low Risk  Once      03/04/18 2130 03/04/18 2131  Pharmacologic VTE Prophylaxis Not Indicated: Currently Anticoagulated  Once      03/04/18 2130 03/04/18 2131  Place Compression Stockings (TEDs)  Once      03/04/18 2130 03/04/18 2131  Remove & Replace Compression Stockings (TEDS) Daily  Daily      03/04/18 2130 03/04/18 2131  Cardiac Monitoring  Continuous      03/04/18 2130 03/04/18 2131  Vital Signs Per Hospital Policy  Per Hospital Policy      03/04/18 2130 03/04/18 2131  Daily Weights  Daily      03/04/18 2130 03/04/18 2131  Tobacco Cessation  Education  Once      03/04/18 2130 03/04/18 2131  Diet Regular; Cardiac  Diet Effective Now      03/04/18 2130 03/04/18 2131  Adult Transthoracic Echo Complete W/ Cont if Necessary Per Protocol  Once      03/04/18 2130 03/04/18 2130  sodium chloride 0.9 % flush 1-10 mL  As Needed      03/04/18 2130 03/04/18 2104  POC Glucose Once  Once      03/04/18 2103 03/04/18 2018  digoxin (LANOXIN) injection 250 mcg  Once      03/04/18 2017 03/04/18 2017  Inpatient Admission  Once      03/04/18 2017 03/04/18 2017  Digoxin Level  Once      03/04/18 2017 03/04/18 2016  Critical Care  Once     Comments:  This order was created via procedure documentation    03/04/18 2015 03/04/18 2000  [facility] (on-call MD unless specified)  Once     Specialty:  Cardiology  Provider:  (Not yet assigned)    03/04/18 1959    03/04/18 1850  Comprehensive Metabolic Panel  Once      03/04/18 1849    03/04/18 1850  Protime-INR  Once      03/04/18 1849    03/04/18 1850  Troponin  Once      03/04/18 1849    03/04/18 1850  CBC Auto Differential  STAT      03/04/18 1849    03/04/18 1849  aPTT  Once      03/04/18 1849    03/04/18 1849  BNP  Once      03/04/18 1849    03/04/18 1849  CBC & Differential  Once,   Status:  Canceled      03/04/18 1849    03/04/18 1849  CBC Auto Differential  PROCEDURE ONCE,   Status:  Canceled      03/04/18 1909    03/04/18 1843  sodium chloride 0.9 % bolus 1,000 mL  Once      03/04/18 1841    03/04/18 1833  labetalol (NORMODYNE,TRANDATE) injection 10 mg  Once      03/04/18 1831    03/04/18 1831  Insert peripheral IV  Once      03/04/18 1831    03/04/18 1831  CBC & Differential  Once,   Status:  Canceled      03/04/18 1831    03/04/18 1831  Comprehensive Metabolic Panel  Once,   Status:  Canceled      03/04/18 1831    03/04/18 1831  Protime-INR  Once,   Status:  Canceled      03/04/18 1831 03/04/18 1831  aPTT  Once,   Status:  Canceled      03/04/18 1831 03/04/18 1831  BNP  Once,   Status:  Canceled       03/04/18 1831    03/04/18 1831  Troponin  Once,   Status:  Canceled      03/04/18 1831    03/04/18 1831  ECG 12 Lead  Once,   Status:  Canceled      03/04/18 1831    03/04/18 1831  XR Chest 1 View  1 Time Imaging      03/04/18 1831    03/04/18 1831  CBC Auto Differential  PROCEDURE ONCE,   Status:  Canceled      03/04/18 1831    03/04/18 1830  sodium chloride 0.9 % flush 10 mL  As Needed      03/04/18 1831    03/04/18 1817  Gordo Draw  Once      03/04/18 1817    03/04/18 1817  Light Blue Top  PROCEDURE ONCE      03/04/18 1817    03/04/18 1817  Green Top (Gel)  PROCEDURE ONCE      03/04/18 1817    03/04/18 1817  Lavender Top  PROCEDURE ONCE      03/04/18 1817    03/04/18 1817  Gold Top - SST  PROCEDURE ONCE      03/04/18 1817    03/04/18 1808  ECG 12 Lead  Once      03/04/18 1809    --  warfarin (COUMADIN) 5 MG tablet  Daily Warfarin      03/04/18 2135

## 2018-03-06 LAB
INR PPP: 2.31 (ref 0.9–1.1)
PROTHROMBIN TIME: 25 SECONDS (ref 11.7–14.2)

## 2018-03-06 PROCEDURE — 99232 SBSQ HOSP IP/OBS MODERATE 35: CPT | Performed by: NURSE PRACTITIONER

## 2018-03-06 PROCEDURE — 25010000002 DIGOXIN PER 500 MCG: Performed by: NURSE PRACTITIONER

## 2018-03-06 PROCEDURE — 85610 PROTHROMBIN TIME: CPT | Performed by: INTERNAL MEDICINE

## 2018-03-06 RX ORDER — DIGOXIN 0.25 MG/ML
500 INJECTION INTRAMUSCULAR; INTRAVENOUS ONCE
Status: COMPLETED | OUTPATIENT
Start: 2018-03-06 | End: 2018-03-06

## 2018-03-06 RX ORDER — DIGOXIN 0.25 MG/ML
250 INJECTION INTRAMUSCULAR; INTRAVENOUS ONCE
Status: COMPLETED | OUTPATIENT
Start: 2018-03-06 | End: 2018-03-06

## 2018-03-06 RX ADMIN — WARFARIN SODIUM 5 MG: 5 TABLET ORAL at 18:37

## 2018-03-06 RX ADMIN — CARVEDILOL 3.12 MG: 3.12 TABLET, FILM COATED ORAL at 08:11

## 2018-03-06 RX ADMIN — DIGOXIN 0.12 MG: 0.12 TABLET ORAL at 11:52

## 2018-03-06 RX ADMIN — CARVEDILOL 3.12 MG: 3.12 TABLET, FILM COATED ORAL at 22:26

## 2018-03-06 RX ADMIN — DIGOXIN 500 MCG: 0.25 INJECTION INTRAMUSCULAR; INTRAVENOUS at 14:49

## 2018-03-06 RX ADMIN — DIGOXIN 250 MCG: 0.25 INJECTION INTRAMUSCULAR; INTRAVENOUS at 18:38

## 2018-03-06 NOTE — PROGRESS NOTES
"    Patient Name: Yana Lehman  :1955  62 y.o.      Patient Care Team:  No Known Provider as PCP - General    Chief Complaint: follow up atrial fibrillation    Interval History: HR still elevated. 's. She feels pretty good. Still on oxygen.        Objective   Vital Signs  Temp:  [98 °F (36.7 °C)-98.6 °F (37 °C)] 98 °F (36.7 °C)  Heart Rate:  [] 90  Resp:  [18] 18  BP: ()/(63-76) 102/63    Intake/Output Summary (Last 24 hours) at 18 1329  Last data filed at 18 1156   Gross per 24 hour   Intake              240 ml   Output              800 ml   Net             -560 ml     Flowsheet Rows         First Filed Value    Admission Height  157.5 cm (62\") Documented at 2018 1808    Admission Weight  49.9 kg (110 lb) Documented at 2018 1812          Physical Exam:   General Appearance:    Alert, cooperative, in no acute distress   Lungs:     Clear to auscultation.  Normal respiratory effort and rate.      Heart:    irregular rhythm and normal rate, normal S1 and S2, 2/6 systolic murmurs, +click no gallops or rubs.     Chest Wall:    No abnormalities observed   Abdomen:     Soft, nontender, positive bowel sounds.     Extremities:   no cyanosis, clubbing or edema.  No marked joint deformities.  Adequate musculoskeletal strength.       Results Review:      Results from last 7 days  Lab Units 18  0515   SODIUM mmol/L 144   POTASSIUM mmol/L 4.1   CHLORIDE mmol/L 108*   CO2 mmol/L 23.6   BUN mg/dL 19   CREATININE mg/dL 0.95   GLUCOSE mg/dL 87   CALCIUM mg/dL 8.4*       Results from last 7 days  Lab Units 18  1831   TROPONIN T ng/mL <0.010       Results from last 7 days  Lab Units 18  1831   WBC 10*3/mm3 12.66*   HEMOGLOBIN g/dL 13.5   HEMATOCRIT % 43.0   PLATELETS 10*3/mm3 246       Results from last 7 days  Lab Units 18  0640 18  1306 18  0515 18  1831   INR  2.31* 2.39* 2.83* 2.77*   APTT seconds  --   --   --  38.5*                     "   Medication Review:     carvedilol 3.125 mg Oral Q12H   digoxin 0.125 mg Oral Daily   warfarin 5 mg Oral Daily             Assessment/Plan   1. Atrial fibrillation - new diagnosis- she was started on low dose beta blocker. She received IV digoxin last night (250 mcg). HR still tachy. Will complete dig load and continue daily. BP too low for more beta blocker. She is anticoagulated with warfarin.    2. Acute systolic congestive heart failure. TTE with EF 20-25% (normal LV function in 2015). Likely exacerbated by rapid heart rates and atrial fibrillation.     3. History of mechanical MVR - INR therapeutic. Grossly normal on echo.     4. Normal coronary arteries on cath in 2015    5. Depression    6. Tobacco abuse- cessation encouraged    7. Pulmonary HTN - RSVP 41 mmHg (previously noted to be 94 mmHg in 2015).    MONICA Arroyo  [facility]  03/06/18  1:29 PM

## 2018-03-06 NOTE — PLAN OF CARE
Problem: Patient Care Overview (Adult)  Goal: Plan of Care Review  Outcome: Ongoing (interventions implemented as appropriate)   03/06/18 0249   Coping/Psychosocial Response Interventions   Plan Of Care Reviewed With patient   Patient Care Overview   Progress improving   Outcome Evaluation   Outcome Summary/Follow up Plan Pt had been resting this shift. Pt did go for a was and waled several times around the nurses station. Pt is a very pleasent ladt who came into ED for SOA and Rapid Hearrate. Pt is stilltacky but doing better will cont monik monitor.,       Problem: Cardiac: Heart Failure (Adult)  Goal: Signs and Symptoms of Listed Potential Problems Will be Absent or Manageable (Cardiac: Heart Failure)  Outcome: Ongoing (interventions implemented as appropriate)      Problem: Arrhythmia/Dysrhythmia (Symptomatic) (Adult)  Goal: Signs and Symptoms of Listed Potential Problems Will be Absent or Manageable (Arrhythmia/Dysrhythmia)  Outcome: Ongoing (interventions implemented as appropriate)      Problem: Fall Risk (Adult)  Goal: Absence of Falls  Outcome: Ongoing (interventions implemented as appropriate)

## 2018-03-06 NOTE — PAYOR COMM NOTE
"  Jyoti Lehman (62 y.o. Female)     PLEASE SEE ATTACHED CLINICAL UPDATE.   PT. REMAINS ON A MONITORED TELEM FLOOR.     REF#[id number]    PLEASE CALL   OR  [phone] WITH CONTINUED STAY AUTHORIZATION.       THANK YOU    EAMON DAVID LPN, CCP    Date of Birth Social Security Number Address Home Phone MRN    1955  [id number] [facility] [id number] [phone] [id number]    [place] Marital Status          Protestant Single       Admission Date Admission Type Admitting Provider Attending Provider Department, Room/Bed    3/4/18 Emergency Lindsey Vargas MD McFarland, Rebecca M, MD [address], 616/1    Discharge Date Discharge Disposition Discharge Destination                      Attending Provider: Lindsey Vargas MD     Allergies:  No Known Allergies    Isolation:  None   Infection:  None   Code Status:  FULL    Ht:  157.5 cm (62\")   Wt:  55 kg (121 lb 3.2 oz)    Admission Cmt:  None   Principal Problem:  None                Active Insurance as of 3/4/2018     Primary Coverage     Payor Plan Insurance Group Employer/Plan Group    [employer] MEDICAID [employer] [id number]     Payor Plan Address Payor Plan Phone Number Effective From Effective To    PO  [phone] 11/1/2015     [place] [address]       Subscriber Name Subscriber Birth Date Member ID       JYOTI LEHMAN 1955 [id number]                 Emergency Contacts      (Rel.) Home Phone Work Phone Mobile Phone    KarlaveliaMarlinCamila (Sister) [phone] -- [phone]    DahliaNiyah (Sister) -- -- [phone]              Intake & Output (last day)       03/05 0701 - 03/06 0700 03/06 0701 - 03/07 0700    P.O. 240     IV Piggyback      Total Intake(mL/kg) 240 (4.4)     Urine (mL/kg/hr) 350 (0.3) 700 (1.6)    Total Output 350 700    Net -110 -700          Unmeasured Urine Occurrence 1 x         All medication doses during the admission are shown, including meds " that are no longer on order.     Scheduled Meds Sorted by Name for aYna Lehman as of 3/4/18 through 3/6/18       1 Day 3 Days 7 Days 10 Days < Today >    Legend:                          Inactive     Active     Other Encounter    Linked               Medications 03/04/18 03/05/18 03/06/18   bumetanide (BUMEX) injection 0.5 mg  Dose: 0.5 mg Freq: Once Route: IV  Start: 03/04/18 2215 End: 03/04/18 2225    Admin Instructions:   Give slow IV push over 1-2 minutes.    2225                carvedilol (COREG) tablet 3.125 mg  Dose: 3.125 mg Freq: Every 12 Hours Scheduled Route: PO  Start: 03/04/18 2215    Admin Instructions:   Hold for heart rate less than 60 or systolic blood pressure less than 90.  Give with food.    2225 0806 2012 0811 2100            digoxin (LANOXIN) injection 250 mcg  Dose: 250 mcg Freq: Once Route: IV  Start: 03/06/18 1815    Admin Instructions:    Check and record heart rate. Use filter needle to withdraw dose from ampule. Dose may be pushed over 5 minutes.      1815              digoxin (LANOXIN) injection 250 mcg  Dose: 250 mcg Freq: Once Route: IV  Start: 03/04/18 2018 End: 03/04/18 2028    Admin Instructions:    Check and record heart rate. Use filter needle to withdraw dose from ampule. Dose may be pushed over 5 minutes.    2028 [C]                digoxin (LANOXIN) injection 500 mcg  Dose: 500 mcg Freq: Once Route: IV  Start: 03/06/18 1415 End: 03/06/18 1449    Admin Instructions:    Check and record heart rate. Use filter needle to withdraw dose from ampule. Dose may be pushed over 5 minutes.      1449              digoxin (LANOXIN) tablet 0.125 mg  Dose: 0.125 mg Freq: Daily Digoxin Route: PO  Start: 03/05/18 1200    Admin Instructions:   Goal HR 60-80,  with exertion.  Check and record heart rate.     1146            1152              Influenza Vac Subunit Quad (FLUCELVAX) injection 0.5 mL  Dose: 0.5 mL Freq: Once Route: IM  Start: 03/05/18 1200 End:  03/05/18 1237    Admin Instructions:   If unable to administer at this scheduled time, please notify Pharmacy and reschedule the dose.  **Do Not Administer if Temperature Greater Than 102F & Notify Pharmacy** Pneumococcal & Influenza Vaccines May Be Given at the Same Time in SEPARATE Injections.     1237               labetalol (NORMODYNE,TRANDATE) injection 10 mg  Dose: 10 mg Freq: Once Route: IV  Start: 03/04/18 1833 End: 03/04/18 1842    Admin Instructions:   As ordered  Give by slow IV Push each 20mg (or less) over 2 minutes    1842 [C]                perflutren (DEFINITY) 8.476 mg in sodium chloride 0.9 % 10 mL injection  Dose: 2 mL Freq: Once Route: IV  Start: 03/05/18 1200 End: 03/05/18 1045    Admin Instructions:   Mix 1.3 mL of mechanically activated Definity with 8.7 mL of normal saline. A total of 2 mL of the resulting Definity solution was administered.     1045 1200             sodium chloride 0.9 % bolus 1,000 mL  Dose: 1,000 mL Freq: Once Route: IV  Last Dose: Stopped (03/04/18 2006)  Start: 03/04/18 1843 End: 03/04/18 2006    1852     2006              warfarin (COUMADIN) tablet 5 mg  Dose: 5 mg Freq: Daily Warfarin Route: PO  Start: 03/05/18 1800    Admin Instructions:   Food-Drug Interaction   Review INR prior to administration     1732            1800              warfarin (COUMADIN) tablet 7.5 mg  Dose: 7.5 mg Freq: Daily Warfarin Route: PO  Start: 03/04/18 2215 End: 03/05/18 0641    Admin Instructions:   Food-Drug Interaction   Review INR prior to administration    (2200) [C]            0641-D/C'd           Medications 03/04/18 03/05/18 03/06/18         Continuous Meds Sorted by Name for Yana Lehman as of 3/4/18 through 3/6/18      Legend:                          Inactive     Active     Other Encounter    Linked               Medications 03/04/18 03/05/18 03/06/18         PRN Meds Sorted by Name for Yana Lehman as of 3/4/18 through 3/6/18      Legend:                           Inactive     Active     Other Encounter    Linked               Medications 18   pneumococcal polysaccharide 23-valent (PNEUMOVAX-23) vaccine 0.5 mL  Dose: 0.5 mL Freq: During Hospitalization Route: IM  PRN Reason: Immunization  Start: 18 End: 18 1448    Admin Instructions:   **Do Not Administer if Temperature Greater Than 102F & Notify Pharmacy**   Pneumococcal & Influenza Vaccines May Be Given At The Same Time in SEPARATE Injections       1448               sodium chloride 0.9 % flush 1-10 mL  Dose: 1-10 mL Freq: As Needed Route: IV  PRN Reason: Line Care  Start: 18         sodium chloride 0.9 % flush 10 mL  Dose: 10 mL Freq: As Needed Route: IV  PRN Reason: Line Care  Start: 18 183                     Orders (last 24 hrs)     Start     Ordered    18 1815  digoxin (LANOXIN) injection 250 mcg  Once      18 1331    18 1415  digoxin (LANOXIN) injection 500 mcg  Once      18 1331    18 1800  warfarin (COUMADIN) tablet 5 mg  Daily Warfarin      18 0641    18 1200  digoxin (LANOXIN) tablet 0.125 mg  Daily Digoxin      18 2130    18 2215  carvedilol (COREG) tablet 3.125 mg  Every 12 Hours Scheduled      180    18 2131  Protime-INR  Daily      18 21318 2130  sodium chloride 0.9 % flush 1-10 mL  As Needed      18 1830  sodium chloride 0.9 % flush 10 mL  As Needed      18 1831    --  warfarin (COUMADIN) 5 MG tablet  Daily Warfarin      18 2136             Physician Progress Notes (last 24 hours) (Notes from 3/5/2018  3:07 PM through 3/6/2018  3:07 PM)      MONICA Barrow at 3/6/2018  1:29 PM  Version 1 of 1             Patient Name: Yana Lehman  :1955  62 y.o.      Patient Care Team:  No Known Provider as PCP - General    Chief Complaint: follow up atrial fibrillation    Interval History: HR still elevated. 's. She feels  "pretty good. Still on oxygen.        Objective   Vital Signs  Temp:  [98 °F (36.7 °C)-98.6 °F (37 °C)] 98 °F (36.7 °C)  Heart Rate:  [] 90  Resp:  [18] 18  BP: ()/(63-76) 102/63    Intake/Output Summary (Last 24 hours) at 03/06/18 1329  Last data filed at 03/06/18 1156   Gross per 24 hour   Intake              240 ml   Output              800 ml   Net             -560 ml     Flowsheet Rows         First Filed Value    Admission Height  157.5 cm (62\") Documented at 03/04/2018 1808    Admission Weight  49.9 kg (110 lb) Documented at 03/04/2018 1812          Physical Exam:   General Appearance:    Alert, cooperative, in no acute distress   Lungs:     Clear to auscultation.  Normal respiratory effort and rate.      Heart:    irregular rhythm and normal rate, normal S1 and S2, 2/6 systolic murmurs, +click no gallops or rubs.     Chest Wall:    No abnormalities observed   Abdomen:     Soft, nontender, positive bowel sounds.     Extremities:   no cyanosis, clubbing or edema.  No marked joint deformities.  Adequate musculoskeletal strength.       Results Review:      Results from last 7 days  Lab Units 03/05/18  0515   SODIUM mmol/L 144   POTASSIUM mmol/L 4.1   CHLORIDE mmol/L 108*   CO2 mmol/L 23.6   BUN mg/dL 19   CREATININE mg/dL 0.95   GLUCOSE mg/dL 87   CALCIUM mg/dL 8.4*       Results from last 7 days  Lab Units 03/04/18  1831   TROPONIN T ng/mL <0.010       Results from last 7 days  Lab Units 03/04/18  1831   WBC 10*3/mm3 12.66*   HEMOGLOBIN g/dL 13.5   HEMATOCRIT % 43.0   PLATELETS 10*3/mm3 246       Results from last 7 days  Lab Units 03/06/18  0640 03/05/18  1306 03/05/18  0515 03/04/18  1831   INR  2.31* 2.39* 2.83* 2.77*   APTT seconds  --   --   --  38.5*                       Medication Review:     carvedilol 3.125 mg Oral Q12H   digoxin 0.125 mg Oral Daily   warfarin 5 mg Oral Daily             Assessment/Plan   1. Atrial fibrillation - new diagnosis- she was started on low dose beta blocker. She " received IV digoxin last night (250 mcg). HR still tachy. Will complete dig load and continue daily. BP too low for more beta blocker. She is anticoagulated with warfarin.    2. Acute systolic congestive heart failure. TTE with EF 20-25% (normal LV function in 2015). Likely exacerbated by rapid heart rates and atrial fibrillation.     3. History of mechanical MVR - INR therapeutic. Grossly normal on echo.     4. Normal coronary arteries on cath in 2015    5. Depression    6. Tobacco abuse- cessation encouraged    7. Pulmonary HTN - RSVP 41 mmHg (previously noted to be 94 mmHg in 2015).    MONICA Arroyo  [facility]  03/06/18  1:29 PM     Electronically signed by MONICA Barrow at 3/6/2018  2:20 PM       Patient Vitals for the past 24 hrs:   BP Temp Temp src Pulse Resp SpO2 Weight   03/06/18 1259 102/63 98 °F (36.7 °C) Oral 90 18 97 % -   03/06/18 1152 - - - 91 - - -   03/06/18 0811 96/70 98.2 °F (36.8 °C) Oral 112 18 96 % -   03/06/18 0554 - - - - - - 55 kg (121 lb 3.2 oz)   03/06/18 0056 108/63 98.4 °F (36.9 °C) Oral 97 18 96 % -   03/05/18 2111 - - - - - 91 % -   03/05/18 2012 102/76 98.6 °F (37 °C) Oral 88 18 91 % -

## 2018-03-07 LAB
INR PPP: 2.04 (ref 0.9–1.1)
PROTHROMBIN TIME: 22.6 SECONDS (ref 11.7–14.2)

## 2018-03-07 PROCEDURE — 85610 PROTHROMBIN TIME: CPT | Performed by: INTERNAL MEDICINE

## 2018-03-07 PROCEDURE — 99233 SBSQ HOSP IP/OBS HIGH 50: CPT | Performed by: INTERNAL MEDICINE

## 2018-03-07 RX ORDER — FUROSEMIDE 20 MG/1
20 TABLET ORAL DAILY
Status: DISCONTINUED | OUTPATIENT
Start: 2018-03-07 | End: 2018-03-08 | Stop reason: HOSPADM

## 2018-03-07 RX ORDER — WARFARIN SODIUM 7.5 MG/1
7.5 TABLET ORAL
Status: COMPLETED | OUTPATIENT
Start: 2018-03-07 | End: 2018-03-07

## 2018-03-07 RX ADMIN — CARVEDILOL 3.12 MG: 3.12 TABLET, FILM COATED ORAL at 22:37

## 2018-03-07 RX ADMIN — WARFARIN SODIUM 7.5 MG: 7.5 TABLET ORAL at 16:44

## 2018-03-07 RX ADMIN — CARVEDILOL 3.12 MG: 3.12 TABLET, FILM COATED ORAL at 10:53

## 2018-03-07 RX ADMIN — FUROSEMIDE 20 MG: 20 TABLET ORAL at 10:54

## 2018-03-07 RX ADMIN — DIGOXIN 0.12 MG: 0.12 TABLET ORAL at 12:26

## 2018-03-07 NOTE — PLAN OF CARE
Problem: Patient Care Overview (Adult)  Goal: Plan of Care Review  Outcome: Ongoing (interventions implemented as appropriate)   03/07/18 1534   Coping/Psychosocial Response Interventions   Plan Of Care Reviewed With patient   Patient Care Overview   Progress improving   Outcome Evaluation   Outcome Summary/Follow up Plan No new complaints today. VSS, seems to be tolerating new medications. No signs of discomfort todat. Plan is for d/c tomorrow        Problem: Arrhythmia/Dysrhythmia (Symptomatic) (Adult)  Goal: Signs and Symptoms of Listed Potential Problems Will be Absent or Manageable (Arrhythmia/Dysrhythmia)  Outcome: Ongoing (interventions implemented as appropriate)

## 2018-03-07 NOTE — PROGRESS NOTES
Discharge Planning Assessment  [facility]     Patient Name: Yana Lehman  MRN: [id number]  Today's Date: 3/7/2018    Admit Date: 3/4/2018          Discharge Needs Assessment       03/07/18 1607    Living Environment    Lives With sibling(s)   lives with sisterRisa    Living Arrangements house    Home Accessibility no concerns    Stair Railings at Home inside, present on right side    Type of Financial/Environmental Concern none    Transportation Available car;family or friend will provide    Living Environment    Quality Of Family Relationships supportive    Able to Return to Prior Living Arrangements yes    Discharge Needs Assessment    Concerns To Be Addressed denies needs/concerns at this time    Readmission Within The Last 30 Days no previous admission in last 30 days    Anticipated Changes Related to Illness none    Equipment Currently Used at Home none    Equipment Needed After Discharge none    Discharge Contact Information if Applicable sisterCamila ( [phone])            Discharge Plan       03/07/18 1608    Case Management/Social Work Plan    Plan home with sister- no needs    Patient/Family In Agreement With Plan yes    Additional Comments Facesheet verified.  Spoke with patient in room.  Introduced self and explained role.  Patient lives in a ss house with her sisterRisa .  She does not use any DME and does not have a SNU history.  She has used HH in the past.  When asked about a PCP, she states that she has a nurse practitioner that [facility] has assigned her, but she is unsure of the name.  Denies any needs and plans to return home.  CCP will follow. Luz Maria Landin RN        Discharge Placement     No information found                Demographic Summary       03/07/18 1606    Referral Information    Admission Type inpatient    Arrived From admitted as an inpatient    Referral Source admission list    Reason For Consult discharge planning            Functional Status        03/07/18 1606    Functional Status Current    Ambulation 0-->independent    Transferring 0-->independent    Toileting 0-->independent    Bathing 0-->independent    Dressing 0-->independent    Eating 0-->independent    Communication 0-->understands/communicates without difficulty    Swallowing (if score 2 or more for any item, consult Rehab Services) 0-->swallows foods/liquids without difficulty    Change in Functional Status Since Onset of Current Illness/Injury no    Functional Status Prior    Ambulation 0-->independent    Transferring 0-->independent    Toileting 0-->independent    Bathing 0-->independent    Dressing 0-->independent    Eating 0-->independent    Communication 0-->understands/communicates without difficulty    IADL    Medications independent    Meal Preparation independent    Housekeeping independent    Laundry independent    Shopping independent    Oral Care independent    Cognitive/Perceptual/Developmental    Current Mental Status/Cognitive Functioning no deficits noted    Recent Changes in Mental Status/Cognitive Functioning no changes            Psychosocial     None            Abuse/Neglect     None            Legal     None            Substance Abuse     None            Patient Forms     None          Luz Maria Landin, RN

## 2018-03-07 NOTE — PROGRESS NOTES
Hospital Follow Up    LOS:  LOS: 3 days   Patient Name: Yana Lehman  Age/Sex: 62 y.o. female  : 1955  MRN: [id number]    Date of Hospital Visit: 18  Length of Stay: 3  Encounter Provider: Anoop Muñoz MD  Place of Service: [facility] CARDIOLOGY    Subjective:     Follow Up for: atrial fib, valve disease    Interval History: The patient is breathing better.  Her heart rate is under better control of her blood pressure is more stable.  Objective:     Objective:  Temp:  [97.9 °F (36.6 °C)-98.4 °F (36.9 °C)] 98.4 °F (36.9 °C)  Heart Rate:  [] 94  Resp:  [18] 18  BP: ()/(63-81) 112/70  Body mass index is 21.73 kg/(m^2).    Intake/Output Summary (Last 24 hours) at 18 0803  Last data filed at 18 2347   Gross per 24 hour   Intake                0 ml   Output             1450 ml   Net            -1450 ml     Last 3 weights    18  1225 18  0554 18  0642   Weight: 51.4 kg (113 lb 4.8 oz) 55 kg (121 lb 3.2 oz) 53.9 kg (118 lb 12.8 oz)     Weight change: 2.495 kg (5 lb 8 oz)    Physical Exam:   General Appearance: Alert, cooperative, in no acute distress. AAOx4.   HEENT: Normocephalic.  Neck: Supple. No JVD. No Carotid bruit. No thyromegaly  Lungs: CTAB. Normal respiratory effort and rate.  Heart:: Irregular rate and rhythm, normal S1 and S2, no murmurs, gallops or rubs.  Abdomen: Soft, nontender, non-distended. positive bowel sounds  Extremities: Warm, no cyanosis, or clubbing. No edema.     Lab Review:     Results from last 7 days  Lab Units 18  0515 18  1831   SODIUM mmol/L 144 141   POTASSIUM mmol/L 4.1 4.2   CHLORIDE mmol/L 108* 104   CO2 mmol/L 23.6 21.7*   BUN mg/dL 19 20   CREATININE mg/dL 0.95 1.21*   GLUCOSE mg/dL 87 131*   CALCIUM mg/dL 8.4* 9.1         Results from last 7 days  Lab Units 18  1831   TROPONIN T ng/mL <0.010       Results from last 7 days  Lab Units 18  1831   WBC 10*3/mm3 12.66*    HEMOGLOBIN g/dL 13.5   HEMATOCRIT % 43.0   PLATELETS 10*3/mm3 246       Results from last 7 days  Lab Units 03/07/18  0650 03/06/18  0640  03/04/18  1831   INR  2.04* 2.31*  < > 2.77*   APTT seconds  --   --   --  38.5*   < > = values in this interval not displayed.            Results from last 7 days  Lab Units 03/04/18  1831   PROBNP pg/mL 63587.0*             I reviewed the patient's new clinical results.          I personally viewed and interpreted the patient's EKG/Telemetry data.  Current Medications:   Scheduled Meds:  carvedilol 3.125 mg Oral Q12H   digoxin 0.125 mg Oral Daily   warfarin 5 mg Oral Daily     Continuous Infusions:     Allergies:  No Known Allergies    Assessment & Plan     Active Problems:    Atrial fibrillation with rapid ventricular response    Non-rheumatic mitral valve stenosis    Acute systolic congestive heart failure    Depression      1.  Atrial fibrillation with rapid ventricular response: Heart rate improved with carvedilol and digoxin  She is anticoagulated with warfarin.  2.  Congestive heart failure: Acute systolic.  In addition the patient appears to have right-sided heart failure and pulmonary hypertension.   She has a history of pulmonary hypertension and severe right-sided enlargement.    3.  Valvular heart disease: Mitral stenosis, status post mitral valve replacement with mechanical prosthesis  4.  Depression: The patient admits that she has had serious bouts of depression but has not sought medical therapy  5.  Tobacco abuse: The patient has been advised      Plan: Add diuretics today.  Adjust warfarin.  We'll plan on home tomorrow.      Anoop Muñoz MD  03/07/18

## 2018-03-07 NOTE — PLAN OF CARE
Problem: Patient Care Overview (Adult)  Goal: Plan of Care Review  Outcome: Ongoing (interventions implemented as appropriate)   03/07/18 0442   Coping/Psychosocial Response Interventions   Plan Of Care Reviewed With patient   Patient Care Overview   Progress improving   Outcome Evaluation   Outcome Summary/Follow up Plan VSS. Noted to have one 1.9 sec. pause in heart rhythm which is current afib/aflutter. No compliants of chest pain, SOA or discomfort. Independent and up ad shirin. Will continue to monitor for futher episodes of rhythm pauses and notify MD accordingly.        Problem: Cardiac: Heart Failure (Adult)  Goal: Signs and Symptoms of Listed Potential Problems Will be Absent or Manageable (Cardiac: Heart Failure)  Outcome: Ongoing (interventions implemented as appropriate)      Problem: Arrhythmia/Dysrhythmia (Symptomatic) (Adult)  Goal: Signs and Symptoms of Listed Potential Problems Will be Absent or Manageable (Arrhythmia/Dysrhythmia)  Outcome: Ongoing (interventions implemented as appropriate)      Problem: Fall Risk (Adult)  Goal: Identify Related Risk Factors and Signs and Symptoms  Outcome: Ongoing (interventions implemented as appropriate)    Goal: Absence of Falls  Outcome: Ongoing (interventions implemented as appropriate)

## 2018-03-08 VITALS
HEIGHT: 62 IN | BODY MASS INDEX: 21.31 KG/M2 | DIASTOLIC BLOOD PRESSURE: 77 MMHG | TEMPERATURE: 97.7 F | RESPIRATION RATE: 18 BRPM | HEART RATE: 74 BPM | OXYGEN SATURATION: 92 % | WEIGHT: 115.8 LBS | SYSTOLIC BLOOD PRESSURE: 116 MMHG

## 2018-03-08 LAB
INR PPP: 2.03 (ref 0.9–1.1)
PROTHROMBIN TIME: 22.6 SECONDS (ref 11.7–14.2)

## 2018-03-08 PROCEDURE — 85610 PROTHROMBIN TIME: CPT | Performed by: INTERNAL MEDICINE

## 2018-03-08 PROCEDURE — 99238 HOSP IP/OBS DSCHRG MGMT 30/<: CPT | Performed by: INTERNAL MEDICINE

## 2018-03-08 RX ORDER — FUROSEMIDE 20 MG/1
20 TABLET ORAL DAILY
Qty: 30 TABLET | Refills: 3 | Status: SHIPPED | OUTPATIENT
Start: 2018-03-09 | End: 2018-04-11

## 2018-03-08 RX ORDER — POTASSIUM CHLORIDE 750 MG/1
10 TABLET, FILM COATED, EXTENDED RELEASE ORAL DAILY
Qty: 30 TABLET | Refills: 11 | Status: SHIPPED | OUTPATIENT
Start: 2018-03-08 | End: 2018-04-11

## 2018-03-08 RX ORDER — DIGOXIN 125 MCG
0.12 TABLET ORAL
Qty: 30 TABLET | Refills: 3 | Status: SHIPPED | OUTPATIENT
Start: 2018-03-08 | End: 2018-06-30 | Stop reason: SDUPTHER

## 2018-03-08 RX ORDER — CARVEDILOL 3.12 MG/1
3.12 TABLET ORAL EVERY 12 HOURS SCHEDULED
Qty: 60 TABLET | Refills: 3 | Status: SHIPPED | OUTPATIENT
Start: 2018-03-08 | End: 2018-03-15 | Stop reason: SDUPTHER

## 2018-03-08 RX ADMIN — CARVEDILOL 3.12 MG: 3.12 TABLET, FILM COATED ORAL at 08:29

## 2018-03-08 RX ADMIN — FUROSEMIDE 20 MG: 20 TABLET ORAL at 08:29

## 2018-03-08 NOTE — PLAN OF CARE
Problem: Patient Care Overview (Adult)  Goal: Plan of Care Review  Outcome: Ongoing (interventions implemented as appropriate)   03/08/18 0332   Coping/Psychosocial Response Interventions   Plan Of Care Reviewed With patient   Patient Care Overview   Progress improving   Outcome Evaluation   Outcome Summary/Follow up Plan no c.o pain or discomfort tonight. resting well. up ad shirin in room and ambulated a couple times around nse station. tolerated well. IV site was symptomatic per pt so removed and left out since no iv meds or fluids at this time and possibly going home today. still in afib. RA. VSS. poss d.c home today. Will cont to monitor.     Goal: Adult Individualization and Mutuality  Outcome: Ongoing (interventions implemented as appropriate)    Goal: Discharge Needs Assessment  Outcome: Ongoing (interventions implemented as appropriate)      Problem: Cardiac: Heart Failure (Adult)  Goal: Signs and Symptoms of Listed Potential Problems Will be Absent or Manageable (Cardiac: Heart Failure)  Outcome: Ongoing (interventions implemented as appropriate)      Problem: Arrhythmia/Dysrhythmia (Symptomatic) (Adult)  Goal: Signs and Symptoms of Listed Potential Problems Will be Absent or Manageable (Arrhythmia/Dysrhythmia)  Outcome: Ongoing (interventions implemented as appropriate)      Problem: Fall Risk (Adult)  Goal: Identify Related Risk Factors and Signs and Symptoms  Outcome: Ongoing (interventions implemented as appropriate)    Goal: Absence of Falls  Outcome: Ongoing (interventions implemented as appropriate)

## 2018-03-08 NOTE — PLAN OF CARE
Problem: Patient Care Overview (Adult)  Goal: Plan of Care Review  Outcome: Outcome(s) achieved Date Met: 03/08/18 03/08/18 1113   Coping/Psychosocial Response Interventions   Plan Of Care Reviewed With patient   Patient Care Overview   Progress improving   Outcome Evaluation   Outcome Summary/Follow up Plan Pt to be DC'd home today.

## 2018-03-08 NOTE — PROGRESS NOTES
Case Management Discharge Note    Final Note: DC home via private auto. Luz Maria Landin RN    Discharge Placement     No information found        Other: Other (private auto)    Discharge Codes: 01  Discharge to home

## 2018-03-08 NOTE — DISCHARGE SUMMARY
HOSPITAL DISCHARGE SUMMARY    Patient Name: Yana Lehman  Age/Sex: 62 y.o. female  : 1955  MRN: [id number]    Encounter Provider: Anoop Muñoz MD  Referring Provider: Anoop Muñoz MD  Place of Service: [facility] CARDIOLOGY  Patient Care Team:  No Known Provider as PCP - General         Date of Discharge:  3/8/2018     Date of Admit: 3/4/2018    New York Heart Association NYHA Class:3   [id number]: 2    Discharge Diagnosis:   Active Problems:    Atrial fibrillation with rapid ventricular response    Non-rheumatic mitral valve stenosis    Acute systolic congestive heart failure    Depression      Hospital Course: The patient is a 62-year-old white female who normally follows with Dr. Lindsey Vargas.  The patient however has been noncompliant for many years due to financial and social reasons.  The only medication she has maintained his warfarin.  There haven't been any follow-ups for several years.    She has a history of mitral stenosis and underwent a mitral valve replacement with a mechanical prosthesis several years ago.  She has had a history of atrial fibrillation to be slightly treated with amiodarone.  Again the patient had not been on this medication for quite some time.    She came to the emergency room on the date of admission feeling poorly.  The patient has been complaining of fatigue and significant palpitations over several weeks.  She was found to be in atrial fibrillation with a rapid ventricular response.  She was also noted to have an elevated proBNP of 14,000.  Patient was started on medication to slow her heart rate down and also improve her diuresis.  An echocardiogram was performed with the estimated ejection fraction of between 20 and 25%.  Severe left ventricular dysfunction is noted.  There was significant wall motion abnormalities observed.  In addition  the left atrium and right ventricle were moderately dilated.    The patient has improved clinically and is now ready for discharge.  I have emphasized to her the need to continue her medication and not miss her medical follow-up appointments.  She has stated she understands and will comply.      Pertinent Test Results:      Results from last 7 days  Lab Units 03/05/18  0515 03/04/18  1831   SODIUM mmol/L 144 141   POTASSIUM mmol/L 4.1 4.2   CHLORIDE mmol/L 108* 104   CO2 mmol/L 23.6 21.7*   BUN mg/dL 19 20   CREATININE mg/dL 0.95 1.21*   GLUCOSE mg/dL 87 131*   CALCIUM mg/dL 8.4* 9.1         Results from last 7 days  Lab Units 03/04/18  1831   TROPONIN T ng/mL <0.010       Results from last 7 days  Lab Units 03/04/18  1831   WBC 10*3/mm3 12.66*   HEMOGLOBIN g/dL 13.5   HEMATOCRIT % 43.0   PLATELETS 10*3/mm3 246       Results from last 7 days  Lab Units 03/08/18  0612  03/04/18  1831   INR  2.03*  < > 2.77*   APTT seconds  --   --  38.5*   < > = values in this interval not displayed.            Results from last 7 days  Lab Units 03/04/18  1831   PROBNP pg/mL 11033.0*               Discharge Medications   Yana Lehman   Home Medication Instructions [id number]    Printed on:03/08/18 1103   Medication Information                      carvedilol (COREG) 3.125 MG tablet  Take 1 tablet by mouth Every 12 (Twelve) Hours.             digoxin (LANOXIN) 125 MCG tablet  Take 1 tablet by mouth Daily.             furosemide (LASIX) 20 MG tablet  Take 1 tablet by mouth Daily.             potassium chloride (K-DUR) 10 MEQ CR tablet  Take 1 tablet by mouth Daily.             warfarin (COUMADIN) 5 MG tablet  Take 5 mg by mouth Daily.                   Discharge Diet: Heart Healthy      Activity at Discharge:   Activity Instructions     As tolerated                     Discharge disposition: Home    Discharge Activity Instructions:     1  Follow-up Appointments  Future Appointments  Date Time Provider Department Center    3/15/2018 1:00 PM MONICA Novak CD [facility] None   4/11/2018 11:30 AM MD IRIS Stovall CD [facility] None     Follow-up Information     Follow up with No Known Provider. Schedule an appointment as soon as possible for a visit in 7 day(s).    Why:  Encourage Patient to have a PCP for a Hospital follow up appointment     Contact information:    [place] [id number]  [phone]          Follow up with MONICA Wilson Follow up in 7 day(s).    Specialty:  Cardiology    Why:  March 15th @ 1:00 PM    Contact information:    [address] [place] [id number]  [phone]          Follow up with Lindsey Vargas MD Follow up in 34 day(s).    Specialty:  Cardiology    Why:  April 11th @ 11:30 AM    Contact information:    [address] [address]  [phone]                 Anoop Muñoz MD  03/08/18  11:03 AM

## 2018-03-09 NOTE — PAYOR COMM NOTE
"Yana Lehman (62 y.o. Female)     PLEASE SEE ATTACHED DC SUMMARY    REF#[id number]    THANK YOU    EAMON DAVID LPN, CCP    Date of Birth Social Security Number Address Home Phone MRN    1955  [id number] [facility] [address] [phone] [id number]    Scientologist Marital Status          Sikhism Single       Admission Date Admission Type Admitting Provider Attending Provider Department, Room/Bed    3/4/18 Emergency Lindsey Vargas MD  [facility], [address]    Discharge Date Discharge Disposition Discharge Destination        3/8/2018 Home or Self Care             Attending Provider: (none)    Allergies:  No Known Allergies    Isolation:  None   Infection:  None   Code Status:  Prior    Ht:  157.5 cm (62\")   Wt:  52.5 kg (115 lb 12.8 oz)    Admission Cmt:  None   Principal Problem:  None                Active Insurance as of 3/4/2018     Primary Coverage     Payor Plan Insurance Group Employer/Plan Group    [employer] MEDICAID [employer] [id number]     Payor Plan Address Payor Plan Phone Number Effective From Effective To    PO  [phone] 2015     [place] [address]       Subscriber Name Subscriber Birth Date Member ID       YANA LEHMAN 1955 [id number]                 Emergency Contacts      (Rel.) Home Phone Work Phone Mobile Phone    Camila Serrano (Sister) [phone] -- [phone]    DahliaNiyah (Sister) -- -- [phone]               Discharge Summary      Anoop Muñoz MD at 3/8/2018 11:03 AM                                                                                      HOSPITAL DISCHARGE SUMMARY    Patient Name: Yana Lehman  Age/Sex: 62 y.o. female  : 1955  MRN: [id number]    Encounter Provider: Anoop Muñoz MD  Referring Provider: Anoop Muñoz MD  Place of Service: [facility] CARDIOLOGY  Patient Care Team:  No Known Provider as PCP - General     "     Date of Discharge:  3/8/2018     Date of Admit: 3/4/2018    New York Heart Association NYHA Class:3   [id number]: 2    Discharge Diagnosis:   Active Problems:    Atrial fibrillation with rapid ventricular response    Non-rheumatic mitral valve stenosis    Acute systolic congestive heart failure    Depression      Hospital Course: The patient is a 62-year-old white female who normally follows with Dr. Lindsey Vargas.  The patient however has been noncompliant for many years due to financial and social reasons.  The only medication she has maintained his warfarin.  There haven't been any follow-ups for several years.    She has a history of mitral stenosis and underwent a mitral valve replacement with a mechanical prosthesis several years ago.  She has had a history of atrial fibrillation to be slightly treated with amiodarone.  Again the patient had not been on this medication for quite some time.    She came to the emergency room on the date of admission feeling poorly.  The patient has been complaining of fatigue and significant palpitations over several weeks.  She was found to be in atrial fibrillation with a rapid ventricular response.  She was also noted to have an elevated proBNP of 14,000.  Patient was started on medication to slow her heart rate down and also improve her diuresis.  An echocardiogram was performed with the estimated ejection fraction of between 20 and 25%.  Severe left ventricular dysfunction is noted.  There was significant wall motion abnormalities observed.  In addition the left atrium and right ventricle were moderately dilated.    The patient has improved clinically and is now ready for discharge.  I have emphasized to her the need to continue her medication and not miss her medical follow-up appointments.  She has stated she understands and will comply.      Pertinent Test Results:      Results from last 7 days  Lab Units 03/05/18  0515 03/04/18  1831   SODIUM mmol/L 144 141    POTASSIUM mmol/L 4.1 4.2   CHLORIDE mmol/L 108* 104   CO2 mmol/L 23.6 21.7*   BUN mg/dL 19 20   CREATININE mg/dL 0.95 1.21*   GLUCOSE mg/dL 87 131*   CALCIUM mg/dL 8.4* 9.1         Results from last 7 days  Lab Units 03/04/18  1831   TROPONIN T ng/mL <0.010       Results from last 7 days  Lab Units 03/04/18  1831   WBC 10*3/mm3 12.66*   HEMOGLOBIN g/dL 13.5   HEMATOCRIT % 43.0   PLATELETS 10*3/mm3 246       Results from last 7 days  Lab Units 03/08/18  0612  03/04/18  1831   INR  2.03*  < > 2.77*   APTT seconds  --   --  38.5*   < > = values in this interval not displayed.            Results from last 7 days  Lab Units 03/04/18  1831   PROBNP pg/mL 47617.0*               Discharge Medications   Yana Lehman   Home Medication Instructions [id number]    Printed on:03/08/18 1106   Medication Information                      carvedilol (COREG) 3.125 MG tablet  Take 1 tablet by mouth Every 12 (Twelve) Hours.             digoxin (LANOXIN) 125 MCG tablet  Take 1 tablet by mouth Daily.             furosemide (LASIX) 20 MG tablet  Take 1 tablet by mouth Daily.             potassium chloride (K-DUR) 10 MEQ CR tablet  Take 1 tablet by mouth Daily.             warfarin (COUMADIN) 5 MG tablet  Take 5 mg by mouth Daily.                   Discharge Diet: Heart Healthy      Activity at Discharge:   Activity Instructions     As tolerated                     Discharge disposition: Home    Discharge Activity Instructions:     1  Follow-up Appointments  Future Appointments  Date Time Provider Department Center   3/15/2018 1:00 PM MONICA Novak [facility] CD [facility] None   4/11/2018 11:30 AM MD IRIS Stovall CD [facility] None     Follow-up Information     Follow up with No Known Provider. Schedule an appointment as soon as possible for a visit in 7 day(s).    Why:  Encourage Patient to have a PCP for a Hospital follow up appointment     Contact information:    [facility] [address]  [phone]           Follow up with MONICA Wilson Follow up in 7 day(s).    Specialty:  Cardiology    Why:  March 15th @ 1:00 PM    Contact information:    [address]  [place] [id number]  [phone]          Follow up with Lindsey Vargas MD Follow up in 34 day(s).    Specialty:  Cardiology    Why:  April 11th @ 11:30 AM    Contact information:    [address]  [place] [id number]  [phone]                 Anoop Muñoz MD  03/08/18  11:03 AM         Electronically signed by Anoop Muñoz MD at 3/8/2018 11:08 AM

## 2018-03-15 ENCOUNTER — OFFICE VISIT (OUTPATIENT)
Dept: CARDIOLOGY | Facility: CLINIC | Age: 63
End: 2018-03-15

## 2018-03-15 ENCOUNTER — HOSPITAL ENCOUNTER (OUTPATIENT)
Dept: CARDIOLOGY | Facility: HOSPITAL | Age: 63
Setting detail: RECURRING SERIES
Discharge: HOME | End: 2018-03-15

## 2018-03-15 VITALS
BODY MASS INDEX: 19.88 KG/M2 | DIASTOLIC BLOOD PRESSURE: 80 MMHG | HEIGHT: 62 IN | WEIGHT: 108 LBS | HEART RATE: 133 BPM | SYSTOLIC BLOOD PRESSURE: 124 MMHG

## 2018-03-15 DIAGNOSIS — Z95.2 STATUS POST MITRAL VALVE REPLACEMENT: ICD-10-CM

## 2018-03-15 DIAGNOSIS — I34.2 NON-RHEUMATIC MITRAL VALVE STENOSIS: ICD-10-CM

## 2018-03-15 DIAGNOSIS — I27.20 PULMONARY HYPERTENSION (HCC): ICD-10-CM

## 2018-03-15 DIAGNOSIS — I07.1 TRICUSPID VALVE INSUFFICIENCY, UNSPECIFIED ETIOLOGY: ICD-10-CM

## 2018-03-15 DIAGNOSIS — Z79.01 WARFARIN ANTICOAGULATION: ICD-10-CM

## 2018-03-15 DIAGNOSIS — I48.91 ATRIAL FIBRILLATION WITH RAPID VENTRICULAR RESPONSE (HCC): Primary | ICD-10-CM

## 2018-03-15 DIAGNOSIS — I50.21 ACUTE SYSTOLIC CONGESTIVE HEART FAILURE (HCC): ICD-10-CM

## 2018-03-15 PROCEDURE — 93000 ELECTROCARDIOGRAM COMPLETE: CPT | Performed by: NURSE PRACTITIONER

## 2018-03-15 PROCEDURE — 99214 OFFICE O/P EST MOD 30 MIN: CPT | Performed by: NURSE PRACTITIONER

## 2018-03-15 PROCEDURE — 85610 PROTHROMBIN TIME: CPT

## 2018-03-15 PROCEDURE — 36416 COLLJ CAPILLARY BLOOD SPEC: CPT

## 2018-03-15 RX ORDER — CARVEDILOL 3.12 MG/1
6.25 TABLET ORAL EVERY 12 HOURS SCHEDULED
Qty: 60 TABLET | Refills: 3
Start: 2018-03-15 | End: 2018-03-26 | Stop reason: SDUPTHER

## 2018-03-15 NOTE — PROGRESS NOTES
Date of Office Visit: 03/15/2018  Encounter Provider: MONICA Wilson  Place of Service: [facility] CARDIOLOGY  Patient Name: Yana Lehman  :1955    Chief Complaint   Patient presents with   • Atrial Fibrillation   • Congestive Heart Failure   :     HPI: Yana Lehman is a 62 y.o. female who presents today for Hospital follow-up.  She is a new patient to me and her records have been reviewed.  She has a past medical history of paroxysmal atrial fibrillation dating back to 2015.  She was noted to have an abnormal echocardiogram at that time and then underwent transesophageal echocardiogram which revealed normal ejection fraction, severe mitral stenosis, mild to moderate mitral insufficiency.  She had a cardiac catheterization in 2015 showing normal coronary arteries and moderate to severe pulmonary hypertension.  She underwent mitral valve replacement with 31 mm Medtronic mechanical valve in .  Postoperatively she had atrial flutter and underwent cardioversion and remained on amiodarone.  She is an established patient of Dr. Lindsey Vargas and was last seen in 2015 in the office.    She presented to [facility] on 3/4/18 and was noted to have been noncompliant for several years due to depression in social situations. She stopped many of her medications, but did remain on her warfarin.  She presented with fatigue and palpitations.  Her pro BNP was greater than 14,000 and she was noted to be in atrial fibrillation with rapid ventricular response.  She had a 2-D echocardiogram completed which revealed calculated EF of 32% and estimated EF of 21-25%, left ventricular cavity mild to moderately dilated, wall motion abnormalities, left atrium and right ventricle cavity moderately dilated, mechanical mitral valve prosthesis present and grossly normal, moderate tricuspid valve regurgitation, and mild pulmonary hypertension with RVSP of 41 mmHg.  She  was noted to have a chads 2 VASC score of 2 and was recommended to start carvedilol, digoxin, furosemide, potassium, and warfarin.    She presents today for follow-up.  Overall she is feeling much better and has more energy.  She initially went in with fatigue and palpitations and stasis of both subsided.  She denies chest pain, shortness of air, paroxysmal nocturnal dyspnea, orthopnea, cough, edema, dizziness, or syncope.  She is quite teary-eyed today saying that she has had a real wake-up call that she needs to start taking better care of herself.  She has been compliant about all of her medications since leaving the hospital.  She remains on warfarin and denies any bleeding.  She will have a pro time checked today in our office.    The following portion of the patient's history were reviewed and updated as appropriate: past medical history, past surgical history, past social history, past family history, allergies, current medications, and problem list.    Past Medical History:   Diagnosis Date   • Acute bronchitis    • Acute kidney injury     after surgery   • Acute systolic congestive heart failure 3/5/2018   • Cachexia    • Depression    • H/O shortness of breath    • Mitral valve stenosis    • Non-rheumatic mitral valve stenosis 3/5/2018   • Pneumothorax    • Pulmonary hypertension    • Rheumatic fever    • Sinus tachycardia    • Tricuspid valve insufficiency 3/15/2018   • Warfarin anticoagulation 3/15/2018       Past Surgical History:   Procedure Laterality Date   • CARDIAC CATHETERIZATION      procedure outcome: successful   • FOOT SURGERY     • MITRAL VALVE REPLACEMENT  06/2015    Subhash Márquez   • MITRAL VALVE REPLACEMENT     • TONSILLECTOMY         Social History     Social History   • Marital status: Single     Spouse name: N/A   • Number of children: N/A   • Years of education: N/A     Occupational History   • Not on file.     Social History Main Topics   • Smoking status: Former Smoker     Quit  date: 2/18/2018   • Smokeless tobacco: Never Used   • Alcohol use No   • Drug use: No   • Sexual activity: Not on file       Family History   Problem Relation Age of Onset   • Heart failure Mother      congestive   • Cancer Father    • Atrial fibrillation Sister    • Atrial fibrillation Brother    • Heart disease Brother      cardiac pacemaker       Review of Systems   Constitution: Negative for chills, diaphoresis, fever, malaise/fatigue, night sweats, weight gain and weight loss.   HENT: Negative for hearing loss, nosebleeds, sore throat and tinnitus.    Eyes: Negative for blurred vision, double vision, pain and visual disturbance.   Cardiovascular: Negative for chest pain, claudication, cyanosis, dyspnea on exertion, irregular heartbeat, leg swelling, near-syncope, orthopnea, palpitations, paroxysmal nocturnal dyspnea and syncope.   Respiratory: Negative for cough, hemoptysis, shortness of breath, snoring and wheezing.    Endocrine: Negative for cold intolerance, heat intolerance and polyuria.   Hematologic/Lymphatic: Negative for bleeding problem. Does not bruise/bleed easily.   Skin: Negative for color change, dry skin, flushing and itching.   Musculoskeletal: Negative for falls, joint pain, joint swelling, muscle cramps, muscle weakness and myalgias.   Gastrointestinal: Negative for abdominal pain, constipation, heartburn, melena, nausea and vomiting.   Genitourinary: Negative for dysuria and hematuria.   Neurological: Negative for excessive daytime sleepiness, dizziness, light-headedness, loss of balance, numbness, paresthesias, seizures and vertigo.   Psychiatric/Behavioral: Negative for altered mental status, depression, memory loss and substance abuse. The patient does not have insomnia and is not nervous/anxious.    Allergic/Immunologic: Negative for environmental allergies.       No Known Allergies      Current Outpatient Prescriptions:   •  carvedilol (COREG) 3.125 MG tablet, Take 1 tablet by mouth  "Every 12 (Twelve) Hours., Disp: 60 tablet, Rfl: 3  •  digoxin (LANOXIN) 125 MCG tablet, Take 1 tablet by mouth Daily., Disp: 30 tablet, Rfl: 3  •  furosemide (LASIX) 20 MG tablet, Take 1 tablet by mouth Daily., Disp: 30 tablet, Rfl: 3  •  potassium chloride (K-DUR) 10 MEQ CR tablet, Take 1 tablet by mouth Daily., Disp: 30 tablet, Rfl: 11  •  warfarin (COUMADIN) 5 MG tablet, Take 5 mg by mouth Daily., Disp: , Rfl:       Objective:     Vitals:    03/15/18 1311   BP: 124/80   Pulse: (!) 133   Weight: 49 kg (108 lb)   Height: 157.5 cm (62\")     Body mass index is 19.75 kg/m².    PHYSICAL EXAM:    Vitals Reviewed.   General Appearance: No acute distress, well developed and well nourished.  Thin.  Eyes: Conjunctiva and lids: No erythema, swelling, or discharge. Sclera non-icteric.   HENT: Atraumatic, normocephalic. External eyes, ears, and nose normal. No hearing loss noted. Mucous membranes normal. Lips not cyanotic. Neck supple with no tenderness.  Respiratory: No signs of respiratory distress. Respiration rhythm and depth normal.   Clear to auscultation. No rales, crackles, rhonchi, or wheezing auscultated.   Cardiovascular:  Jugular Venous Pressure: Normal  Heart Rate and Rhythm: Irregularly, irregular with rapid ventricular response.  Heart Sounds: Normal S1 and S2.  Mechanical mitral valve.  No S3 or S4 noted.  Murmurs: No murmurs noted. No rubs, thrills, or gallops.   Arterial Pulses: Carotid pulses normal. No carotid bruit noted. Posterior tibialis and dorsalis pedis pulses normal.   Lower Extremities: No edema noted.  Gastrointestinal:  Abdomen soft, non-distended, non-tender. Normal bowel sounds. No hepatomegaly.   Musculoskeletal: Normal movement of extremities  Skin and Nails: General appearance normal. No pallor, cyanosis, diaphoresis. Skin temperature normal. No clubbing of fingernails.   Psychiatric: Patient alert and oriented to person, place, and time. Speech and behavior appropriate. Normal mood and " affect.       ECG 12 Lead  Date/Time: 3/15/2018 1:10 PM  Performed by: RUBY WHEELER  Authorized by: RUBY WHEELER   Comparison: compared with previous ECG from 3/5/2018  Comparison to previous ECG: afib 94 bpm  Rhythm: atrial fibrillation  Rate: tachycardic  BPM: 133  ST Segments: ST segments normal  T Waves: T waves normal  Clinical impression: abnormal ECG              Assessment:       Diagnosis Plan   1. Atrial fibrillation with rapid ventricular response  ECG 12 Lead   2. Warfarin anticoagulation     3. Acute systolic congestive heart failure     4. Non-rheumatic mitral valve stenosis     5. Status post mitral valve replacement     6. Pulmonary hypertension     7. Tricuspid valve insufficiency, unspecified etiology            Plan:       1. Atrial Fibrillation and Atrial Flutter  Assessment  • The patient has paroxysmal atrial fibrillation  • The patient's CHADS2-VASc score is 2  • A [id number] score of 2 or more is considered a high risk for a thromboembolic event  • Warfarin prescribed    Plan  • Attempt to maintain sinus rhythm  • Continue warfarin for antithrombotic therapy, bleeding issues discussed  • Continue beta blocker and digoxin for rate control  • She is in atrial fibrillation with rapid ventricular response and I will further increase her carvedilol to 6.25 mg twice daily.  She remains on warfarin for stroke prevention and will have an INR checked today.  I also discussed with her the potential adverse effects/toxicity of being on digitoxin and she will call our office with any symptoms.    Subjective - Objective  • Atrial fibrillation with heart rate 133-patient is asymptomatic      2. Heart Failure  Assessment  • NYHA class I - There is no limitation of physical activity. Physical activity does not cause fatigue, palpitations or shortness of breath.  • Beta blocker prescribed  • Diuretics prescribed  • Left ventricular function is severely reduced by qualitative assessment    Plan  •  The patient has received heart failure education on the following topics: dietary sodium restriction, medication instructions, symptom management, physical activity and weight monitoring  • Continue meds as instructed     Subjective/Objective    • Physical exam findings negative for rales, peripheral edema and elevated JVP.  • Well compensated today       3.  Status post mitral valve replacement: She states that she's always been compliant with the warfarin.  She just stopped her other medications.  Her recent echocardiogram showed a stable bioprosthetic mitral valve.  4.  Pulmonary Hypertension/moderate tricuspid regurgitation: This was noted to be mild per echocardiogram and she was noted to have moderate tricuspid regurgitation.  5.  She will follow-up early next week for a EKG/blood pressure/heart rate check and then make a follow-up appointment to see Dr. Lindsey Vargas in 4 weeks.    As always, it has been a pleasure to participate in your patient's care.      Sincerely,         MONICA Sanchez

## 2018-03-20 ENCOUNTER — HOSPITAL ENCOUNTER (OUTPATIENT)
Dept: CARDIOLOGY | Facility: HOSPITAL | Age: 63
Setting detail: RECURRING SERIES
Discharge: HOME | End: 2018-03-20

## 2018-03-20 ENCOUNTER — CLINICAL SUPPORT (OUTPATIENT)
Dept: CARDIOLOGY | Facility: CLINIC | Age: 63
End: 2018-03-20

## 2018-03-20 VITALS — DIASTOLIC BLOOD PRESSURE: 70 MMHG | HEART RATE: 96 BPM | SYSTOLIC BLOOD PRESSURE: 112 MMHG

## 2018-03-20 DIAGNOSIS — I48.91 ATRIAL FIBRILLATION WITH RAPID VENTRICULAR RESPONSE (HCC): Primary | ICD-10-CM

## 2018-03-20 DIAGNOSIS — I48.91 ATRIAL FIBRILLATION, UNSPECIFIED TYPE (HCC): Primary | ICD-10-CM

## 2018-03-20 PROCEDURE — 93000 ELECTROCARDIOGRAM COMPLETE: CPT | Performed by: NURSE PRACTITIONER

## 2018-03-20 PROCEDURE — 36416 COLLJ CAPILLARY BLOOD SPEC: CPT

## 2018-03-20 PROCEDURE — 85610 PROTHROMBIN TIME: CPT

## 2018-03-20 NOTE — PROGRESS NOTES
Procedure     ECG 12 Lead  Date/Time: 3/20/2018 11:01 AM  Performed by: JUANITA WHEELER  Authorized by: JUANITA WHEELER   Comparison: compared with previous ECG from 3/15/2018  Comparison to previous ECG: Atrial fibrillation heart rate 133  Rhythm: atrial fibrillation  Rate: normal  BPM: 96  Conduction: conduction normal  ST Segments: ST segments normal  T Waves: T waves normal  Clinical impression: abnormal ECG           Pt came in today for a f/u ECG. Her Carvedilol was increased to 6.25mg bid on 3/15/18. Pt reports feeling much better on the increased dose. She denies CP,SOA, Dizziness and fatigue, HR @ home is running in the 80's. Reviewed ECG w/Juanita, pt will have a 24  holter monitor placed today to confirm rate control/amk

## 2018-03-21 NOTE — PROGRESS NOTES
Reviewed noted and agree with plan of care.  I will review the Holter monitor results and call the patient.

## 2018-03-26 RX ORDER — CARVEDILOL 3.12 MG/1
6.25 TABLET ORAL EVERY 12 HOURS SCHEDULED
Qty: 60 TABLET | Refills: 3 | Status: SHIPPED | OUTPATIENT
Start: 2018-03-26 | End: 2018-03-28 | Stop reason: SDUPTHER

## 2018-03-27 ENCOUNTER — TELEPHONE (OUTPATIENT)
Dept: CARDIOLOGY | Facility: CLINIC | Age: 63
End: 2018-03-27

## 2018-03-27 NOTE — TELEPHONE ENCOUNTER
Holter Monitor:    Monitor Procedure     Study Description Monitor hooked-up on 3/20/2018 at 11:53 EDT. The monitor was scanned on 3/22/2018. The patient was monitored for 1 days 23 hours and 59 minutes. Indications for this exam include atrial fibrillation with rapid ventricular response . Total beats: 617029. Average HR: 84. Min HR: 54. Max HR: 156.      Study Findings Patient diary submitted.No symptoms reported during the monitoring period. No complications noted. The predominant rhythm noted during the testing period was atrial fibrillation. Premature atrial contractions did not appear during monitoring. There was no evidence of atrial arrhythmias. There were no episodes of supraventricular tachycardia. Premature ventricular contractions occured rarely. There were no episodes of ventricular tachycardia.      Study Impressions A relatively benign monitor study. Atrial fibrillation with rvr at 6:43pm, heart rate 156, no symptoms        Monitor stable.  She is on carvedilol, digoxin, and warfarin.  She'll follow-up with Dr. Lindsey Vargas as scheduled.  I have left a message for patient to return my call for results.

## 2018-03-27 NOTE — TELEPHONE ENCOUNTER
----- Message from MONICA Novak sent at 3/21/2018  3:45 PM EDT -----    Follow-up on Holter monitor 3/20/18

## 2018-03-28 RX ORDER — CARVEDILOL 3.12 MG/1
6.25 TABLET ORAL EVERY 12 HOURS SCHEDULED
Qty: 60 TABLET | Refills: 3 | Status: SHIPPED | OUTPATIENT
Start: 2018-03-28 | End: 2018-04-11

## 2018-03-29 NOTE — TELEPHONE ENCOUNTER
Ms. Dominik was notified about results and verbalizes understanding. She will follow up with Dr. Vargas in April.

## 2018-04-03 ENCOUNTER — HOSPITAL ENCOUNTER (OUTPATIENT)
Dept: CARDIOLOGY | Facility: HOSPITAL | Age: 63
Setting detail: RECURRING SERIES
Discharge: HOME | End: 2018-04-03

## 2018-04-03 PROCEDURE — 36416 COLLJ CAPILLARY BLOOD SPEC: CPT

## 2018-04-03 PROCEDURE — 85610 PROTHROMBIN TIME: CPT

## 2018-04-11 ENCOUNTER — OFFICE VISIT (OUTPATIENT)
Dept: CARDIOLOGY | Facility: CLINIC | Age: 63
End: 2018-04-11

## 2018-04-11 VITALS
WEIGHT: 112.2 LBS | BODY MASS INDEX: 20.65 KG/M2 | SYSTOLIC BLOOD PRESSURE: 118 MMHG | HEIGHT: 62 IN | HEART RATE: 99 BPM | DIASTOLIC BLOOD PRESSURE: 70 MMHG

## 2018-04-11 DIAGNOSIS — I48.19 PERSISTENT ATRIAL FIBRILLATION (HCC): Primary | ICD-10-CM

## 2018-04-11 DIAGNOSIS — Z95.2 STATUS POST MITRAL VALVE REPLACEMENT: ICD-10-CM

## 2018-04-11 DIAGNOSIS — I42.9 CARDIOMYOPATHY, UNSPECIFIED TYPE (HCC): ICD-10-CM

## 2018-04-11 PROBLEM — I50.21 ACUTE SYSTOLIC CONGESTIVE HEART FAILURE (HCC): Status: RESOLVED | Noted: 2018-03-05 | Resolved: 2018-04-11

## 2018-04-11 PROBLEM — I48.91 ATRIAL FIBRILLATION WITH RAPID VENTRICULAR RESPONSE (HCC): Status: RESOLVED | Noted: 2018-03-04 | Resolved: 2018-04-11

## 2018-04-11 PROCEDURE — 99214 OFFICE O/P EST MOD 30 MIN: CPT | Performed by: INTERNAL MEDICINE

## 2018-04-11 RX ORDER — METOPROLOL SUCCINATE 25 MG/1
25 TABLET, EXTENDED RELEASE ORAL 2 TIMES DAILY
Qty: 180 TABLET | Refills: 3 | Status: SHIPPED | OUTPATIENT
Start: 2018-04-11 | End: 2019-04-07 | Stop reason: SDUPTHER

## 2018-04-11 NOTE — PROGRESS NOTES
Date of Office Visit: 2018  Encounter Provider: Lindsey Vargas MD  Place of Service: [facility] CARDIOLOGY  Patient Name: Yana Lehman  :1955      Patient ID:  Yana Lehman is a 62 y.o. female is here for  followup for cardiomyopathy, MVR, a fib.         History of Present Illness    She came in through the emergency department on  06/10/2015 with atrial fibrillation and rapid ventricular response.  At that time, she was  tachycardic and hypotensive.  Her cardiac output was very poor.  Her hands and feet were  cool.  Her skin was overall kind of dusky and almost jaundice in appearance.  She was from  a cardiovascular standpoint very unstable.  A stat echocardiogram was ordered and she was  started on fluid boluses.  Her echocardiogram showed an ejection fraction of 50% to 55%  with severe left atrial dilation, severe right ventricular dilation, severe reduction of  right ventricular systolic function, severe right atrial dilation, trace to mild aortic  insufficiency, severely thickened mitral leaflets with grade 3 mobility of the leaflets  and severe mitral stenosis.  The mean gradient across the valve was 14 mmHg.  Her right  ventricular systolic pressure was 94 mmHg and there was moderate tricuspid insufficiency.   An emergent KITTY was done which showed severe left atrial dilation, severely dilated right  ventricle with severe reduction of right ventricular systolic function, severe mitral  stenosis, mild to moderate mitral insufficiency, and moderate tricuspid insufficiency.   She had a cardiac catheterization done on 06/10/2015 showing normal coronary arteries,  moderate to severe pulmonary hypertension, mildly depressed cardiac output, and severely  elevated peripheral vascular resistance.  Her PA pressure was a mean of 55 mmHg.  The RV  pressure was 63/3.  The right atrial pressure was 25.  Dr. Márquez saw her immediately and  took her to the operating room  where she had emergency mitral valve replacement with a 31  mm Medtronic mechanical valve.  Preservation of the papillary muscle with two [place]  Neochord.  Postoperatively she had atrial flutter and underwent cardioversion and remained on amiodarone.       She presented to [facility] on 3/4/18 and was noted to have been noncompliant for several years due to depression in social situations. She stopped many of her medications, but did remain on her warfarin.  She presented with fatigue and palpitations.  Her pro BNP was greater than 14,000 and she was noted to be in atrial fibrillation with rapid ventricular response.  She had a 2-D echocardiogram completed which revealed calculated EF of 32% and estimated EF of 21-25%, left ventricular cavity mild to moderately dilated, wall motion abnormalities, left atrium and right ventricle cavity moderately dilated, mechanical mitral valve prosthesis present and grossly normal, moderate tricuspid valve regurgitation, and mild pulmonary hypertension with RVSP of 41 mmHg.  She was noted to have a chads 2 VASC score of 2 and was recommended to start carvedilol, digoxin, furosemide, potassium, and warfarin.      Her energy level is much better.  Her heart rate is in the 80s.  She had no dizziness or syncope.  She isn't chest pain or pressure.  She has no orthopnea or PND.  Her breathing is better.  She is taking her medications as directed.  She would like to start exercising.    Past Medical History:   Diagnosis Date   • Acute bronchitis    • Acute kidney injury     after surgery   • Acute systolic congestive heart failure 3/5/2018   • Atrial fibrillation with rapid ventricular response    • Cachexia    • Depression    • H/O shortness of breath    • Mitral valve stenosis    • Non-rheumatic mitral valve stenosis 3/5/2018   • Pneumothorax    • Pulmonary hypertension    • Rheumatic fever    • Sinus tachycardia    • Tricuspid valve insufficiency 3/15/2018   • Warfarin  "anticoagulation 3/15/2018         Past Surgical History:   Procedure Laterality Date   • CARDIAC CATHETERIZATION      procedure outcome: successful   • FOOT SURGERY     • MITRAL VALVE REPLACEMENT  06/2015    Subhash Márquez   • MITRAL VALVE REPLACEMENT     • TONSILLECTOMY         Current Outpatient Prescriptions on File Prior to Visit   Medication Sig Dispense Refill   • carvedilol (COREG) 3.125 MG tablet Take 2 tablets by mouth Every 12 (Twelve) Hours. 60 tablet 3   • digoxin (LANOXIN) 125 MCG tablet Take 1 tablet by mouth Daily. 30 tablet 3   • furosemide (LASIX) 20 MG tablet Take 1 tablet by mouth Daily. 30 tablet 3   • potassium chloride (K-DUR) 10 MEQ CR tablet Take 1 tablet by mouth Daily. 30 tablet 11   • warfarin (COUMADIN) 5 MG tablet Take 5 mg by mouth Daily.       No current facility-administered medications on file prior to visit.        Social History     Social History   • Marital status: Single     Spouse name: N/A   • Number of children: N/A   • Years of education: N/A     Occupational History   • Not on file.     Social History Main Topics   • Smoking status: Former Smoker     Quit date: 2/18/2018   • Smokeless tobacco: Never Used   • Alcohol use No   • Drug use: No   • Sexual activity: Not on file     Other Topics Concern   • Not on file     Social History Narrative   • No narrative on file           ROS    Procedures  Procedures        Objective:      Vitals:    04/11/18 1142   BP: 118/70   BP Location: Right arm   Patient Position: Sitting   Pulse: 99   Weight: 50.9 kg (112 lb 3.2 oz)   Height: 157.5 cm (62\")     Body mass index is 20.52 kg/m².    Physical Exam   Constitutional: She is oriented to person, place, and time. She appears well-developed and well-nourished. No distress.   HENT:   Head: Normocephalic and atraumatic.   Eyes: Conjunctivae are normal. No scleral icterus.   Neck: Neck supple. No JVD present. Carotid bruit is not present. No thyromegaly present.   Cardiovascular: Normal rate, " regular rhythm, S1 normal, S2 normal, normal heart sounds and intact distal pulses.   No extrasystoles are present. PMI is not displaced.  Exam reveals no gallop.    No murmur heard.  Pulses:       Carotid pulses are 2+ on the right side, and 2+ on the left side.       Radial pulses are 2+ on the right side, and 2+ on the left side.        Dorsalis pedis pulses are 2+ on the right side, and 2+ on the left side.        Posterior tibial pulses are 2+ on the right side, and 2+ on the left side.   Mechanical click   Pulmonary/Chest: Effort normal and breath sounds normal. No respiratory distress. She has no wheezes. She has no rhonchi. She has no rales. She exhibits no tenderness.   Abdominal: Soft. Bowel sounds are normal. She exhibits no distension, no abdominal bruit and no mass. There is no tenderness.   Musculoskeletal: She exhibits no edema or deformity.   Lymphadenopathy:     She has no cervical adenopathy.   Neurological: She is alert and oriented to person, place, and time. No cranial nerve deficit.   Skin: Skin is warm and dry. No rash noted. She is not diaphoretic. No cyanosis. No pallor. Nails show no clubbing.   Psychiatric: She has a normal mood and affect. Judgment normal.   Vitals reviewed.      Lab Review:       Assessment:      Diagnosis Plan   1. Persistent atrial fibrillation     2. Cardiomyopathy, unspecified type     3. Status post mitral valve replacement       1. Cardiomyopathy - likely tachycardia mediated.  Repeat echo in 6 weeks.    2. Atrial fibrillation - on warfarin, heart rate better but still mildly fast.  D/c coreg and start toprol xl 25mg BID  3. S/p MV replacement, mechanical.   4. Pulmonary HTN, mild  5. depression     Plan:       See wang in 6 weeks with echo, stop lasix and kcl and coreg.

## 2018-04-17 ENCOUNTER — HOSPITAL ENCOUNTER (OUTPATIENT)
Dept: CARDIOLOGY | Facility: HOSPITAL | Age: 63
Setting detail: RECURRING SERIES
Discharge: HOME | End: 2018-04-17

## 2018-04-17 PROCEDURE — 85610 PROTHROMBIN TIME: CPT

## 2018-04-17 PROCEDURE — 36416 COLLJ CAPILLARY BLOOD SPEC: CPT

## 2018-04-25 ENCOUNTER — TELEPHONE (OUTPATIENT)
Dept: CARDIOLOGY | Facility: HOSPITAL | Age: 63
End: 2018-04-25

## 2018-05-09 ENCOUNTER — HOSPITAL ENCOUNTER (OUTPATIENT)
Dept: CARDIOLOGY | Facility: HOSPITAL | Age: 63
Setting detail: RECURRING SERIES
Discharge: HOME | End: 2018-05-09

## 2018-05-09 PROCEDURE — 36416 COLLJ CAPILLARY BLOOD SPEC: CPT

## 2018-05-09 PROCEDURE — 85610 PROTHROMBIN TIME: CPT

## 2018-05-23 ENCOUNTER — OFFICE VISIT (OUTPATIENT)
Dept: CARDIOLOGY | Facility: CLINIC | Age: 63
End: 2018-05-23

## 2018-05-23 ENCOUNTER — HOSPITAL ENCOUNTER (OUTPATIENT)
Dept: CARDIOLOGY | Facility: HOSPITAL | Age: 63
Discharge: HOME | End: 2018-05-23
Attending: INTERNAL MEDICINE | Admitting: INTERNAL MEDICINE

## 2018-05-23 VITALS
DIASTOLIC BLOOD PRESSURE: 84 MMHG | SYSTOLIC BLOOD PRESSURE: 130 MMHG | WEIGHT: 112 LBS | BODY MASS INDEX: 20.61 KG/M2 | HEART RATE: 85 BPM | HEIGHT: 62 IN

## 2018-05-23 VITALS
WEIGHT: 111 LBS | DIASTOLIC BLOOD PRESSURE: 60 MMHG | SYSTOLIC BLOOD PRESSURE: 100 MMHG | BODY MASS INDEX: 20.43 KG/M2 | HEIGHT: 62 IN | HEART RATE: 84 BPM

## 2018-05-23 DIAGNOSIS — I07.1 TRICUSPID VALVE INSUFFICIENCY, UNSPECIFIED ETIOLOGY: ICD-10-CM

## 2018-05-23 DIAGNOSIS — Z95.2 STATUS POST MITRAL VALVE REPLACEMENT: ICD-10-CM

## 2018-05-23 DIAGNOSIS — I48.19 PERSISTENT ATRIAL FIBRILLATION (HCC): ICD-10-CM

## 2018-05-23 DIAGNOSIS — I42.9 CARDIOMYOPATHY, UNSPECIFIED TYPE (HCC): Primary | ICD-10-CM

## 2018-05-23 DIAGNOSIS — I42.9 CARDIOMYOPATHY, UNSPECIFIED TYPE (HCC): ICD-10-CM

## 2018-05-23 DIAGNOSIS — F32.A DEPRESSION, UNSPECIFIED DEPRESSION TYPE: ICD-10-CM

## 2018-05-23 DIAGNOSIS — I27.20 PULMONARY HYPERTENSION (HCC): ICD-10-CM

## 2018-05-23 DIAGNOSIS — Z79.01 WARFARIN ANTICOAGULATION: ICD-10-CM

## 2018-05-23 PROCEDURE — 93306 TTE W/DOPPLER COMPLETE: CPT

## 2018-05-23 PROCEDURE — 25010000002 PERFLUTREN (DEFINITY) 8.476 MG IN SODIUM CHLORIDE 0.9 % 10 ML INJECTION: Performed by: INTERNAL MEDICINE

## 2018-05-23 PROCEDURE — 93000 ELECTROCARDIOGRAM COMPLETE: CPT | Performed by: NURSE PRACTITIONER

## 2018-05-23 PROCEDURE — 0399T ADULT TRANSTHORACIC ECHO COMPLETE W/ CONT IF NECESSARY PER PROTOCOL: CPT | Performed by: INTERNAL MEDICINE

## 2018-05-23 PROCEDURE — 99213 OFFICE O/P EST LOW 20 MIN: CPT | Performed by: NURSE PRACTITIONER

## 2018-05-23 PROCEDURE — 93306 TTE W/DOPPLER COMPLETE: CPT | Performed by: INTERNAL MEDICINE

## 2018-05-23 PROCEDURE — 0399T HC MYOCARDL STRAIN IMAG QUAN ASSMT PER SESS: CPT

## 2018-05-23 RX ADMIN — PERFLUTREN 1.5 ML: 6.52 INJECTION, SUSPENSION INTRAVENOUS at 09:30

## 2018-05-23 NOTE — PROGRESS NOTES
Date of Office Visit: 2018  Encounter Provider: MONICA Wilson  Place of Service: [facility] CARDIOLOGY  Patient Name: Yana Lehman  :1955    Chief Complaint   Patient presents with   • Follow-up   :     HPI: Yana Lehman is a 63 y.o. female who presents today for cardiac follow up.  She has a past medical history of atrial fibrillation dating back to 2015 as remains on warfarin with INR checks here in our office.  She has a history of severe mitral valve stenosis and moderate mitral insufficiency.  She had a cardiac catheterization in 2015 which showed normal coronary arteries and moderate to severe pulmonary hypertension.  She underwent mitral valve replacement with 31 mm Medtronic mechanical valve in .  She developed atrial flutter postoperatively and underwent cardioversion.    I initially evaluated her in March of this year.  She had recently been to the hospital with palpitations and was back in atrial fibrillation with rapid ventricular response.  She had stopped many of her medications due to depression and social situation.  A 2-D echocardiogram revealed a calculated EF of 32% and estimated EF of 21-25%, left ventricular cavity mild to moderately dilated, wall motion abnormalities, left atrium and right ventricle cavity moderately dilated, mechanical mitral valve prosthesis present and grossly normal, moderate tricuspid valve regurgitation, and mild pulmonary hypertension with RVSP of 41 mmHg. she followed up with Dr. Vargas in April and her carvedilol was discontinued and she was started on Toprol XL 25 mg twice daily, furosemide and potassium were discontinued.    She had an echocardiogram earlier today.  She tells me she is feeling better and sleeping better than she has in quite some time. She denies chest pain, shortness of breath, paroxysmal nocturnal dyspnea, orthopnea, cough, wheezing, edema, palpitations, dizziness, syncopal  episodes, and falls.  Her blood pressure is on the low side of normal and she is asymptomatic.  Heart rate well-controlled and weight has been stable.    The following portion of the patient's history were reviewed and updated as appropriate: past medical history, past surgical history, past social history, past family history, allergies, current medications, and problem list.    Past Medical History:   Diagnosis Date   • Acute bronchitis    • Acute kidney injury     after surgery   • Acute systolic congestive heart failure 3/5/2018   • Cachexia    • Cardiomyopathy     unspecified type   • Depression    • H/O shortness of breath    • Mitral valve stenosis     severe; s/p mitral valve replacement    • Persistent atrial fibrillation     on Toprol and warfarin   • Pneumothorax    • Pulmonary hypertension    • Rheumatic fever    • Sinus tachycardia    • Tricuspid valve insufficiency 3/15/2018   • Warfarin anticoagulation 03/15/2018    followed by [facility] INR clinic       Past Surgical History:   Procedure Laterality Date   • CARDIAC CATHETERIZATION      procedure outcome: successful   • FOOT SURGERY     • MITRAL VALVE REPLACEMENT  06/2015    Subhash Willi   • MITRAL VALVE REPLACEMENT     • TONSILLECTOMY         Social History     Social History   • Marital status: Single     Spouse name: N/A   • Number of children: N/A   • Years of education: N/A     Occupational History   • Not on file.     Social History Main Topics   • Smoking status: Former Smoker     Quit date: 2/18/2018   • Smokeless tobacco: Never Used      Comment: caffeine use   • Alcohol use No   • Drug use: No   • Sexual activity: Not on file       Family History   Problem Relation Age of Onset   • Heart failure Mother         congestive   • Cancer Father    • Atrial fibrillation Sister    • Hypertension Sister    • Atrial fibrillation Brother    • Heart disease Brother         cardiac pacemaker   • Hypertension Brother        Review of Systems   Constitution:  "Negative for chills, diaphoresis, fever, malaise/fatigue, night sweats, weight gain and weight loss.   HENT: Negative for hearing loss, nosebleeds, sore throat and tinnitus.    Eyes: Negative for blurred vision, double vision, pain and visual disturbance.   Cardiovascular: Negative for chest pain, claudication, cyanosis, dyspnea on exertion, irregular heartbeat, leg swelling, near-syncope, orthopnea, palpitations, paroxysmal nocturnal dyspnea and syncope.   Respiratory: Negative for cough, hemoptysis, shortness of breath, snoring and wheezing.    Endocrine: Negative for cold intolerance, heat intolerance and polyuria.   Hematologic/Lymphatic: Negative for bleeding problem. Does not bruise/bleed easily.   Skin: Negative for color change, dry skin, flushing and itching.   Musculoskeletal: Negative for falls, joint pain, joint swelling, muscle cramps, muscle weakness and myalgias.   Gastrointestinal: Negative for abdominal pain, constipation, heartburn, melena, nausea and vomiting.   Genitourinary: Negative for dysuria and hematuria.   Neurological: Negative for excessive daytime sleepiness, dizziness, light-headedness, loss of balance, numbness, paresthesias, seizures and vertigo.   Psychiatric/Behavioral: Negative for altered mental status, depression, memory loss and substance abuse. The patient does not have insomnia and is not nervous/anxious.    Allergic/Immunologic: Negative for environmental allergies.       No Known Allergies      Current Outpatient Prescriptions:   •  digoxin (LANOXIN) 125 MCG tablet, Take 1 tablet by mouth Daily., Disp: 30 tablet, Rfl: 3  •  metoprolol succinate XL (TOPROL-XL) 25 MG 24 hr tablet, Take 1 tablet by mouth 2 (Two) Times a Day., Disp: 180 tablet, Rfl: 3  •  warfarin (COUMADIN) 5 MG tablet, Take 5 mg by mouth Daily., Disp: , Rfl:     Objective:     Vitals:    05/23/18 0852   BP: 100/60   Pulse: 84   Weight: 50.3 kg (111 lb)   Height: 157.5 cm (62\")     Body mass index is 20.3 " kg/m².    PHYSICAL EXAM:    Vitals Reviewed.   General Appearance: No acute distress, well developed and well nourished. Thin.   Eyes: Conjunctiva and lids: No erythema, swelling, or discharge. Sclera non-icteric.   HENT: Atraumatic, normocephalic. External eyes, ears, and nose normal. No hearing loss noted. Mucous membranes normal. Lips not cyanotic. Neck supple with no tenderness.  Respiratory: No signs of respiratory distress. Respiration rhythm and depth normal.   Clear to auscultation. No rales, crackles, rhonchi, or wheezing auscultated.   Cardiovascular:  Jugular Venous Pressure: Normal  Heart Rate and Rhythm: Normal, Heart Sounds: Normal S1 and S2. No S3 or S4 noted.  Murmurs: No murmurs noted. No rubs, thrills, or gallops.   Arterial Pulses: Carotid pulses normal. No carotid bruit noted. Posterior tibialis and dorsalis pedis pulses normal.   Lower Extremities: No edema noted.  Gastrointestinal:  Abdomen soft, non-distended, non-tender. Normal bowel sounds. No hepatomegaly.   Musculoskeletal: Normal movement of extremities  Skin and Nails: General appearance normal. No pallor, cyanosis, diaphoresis. Skin temperature normal. No clubbing of fingernails.   Psychiatric: Patient alert and oriented to person, place, and time. Speech and behavior appropriate. Normal mood and affect.       ECG 12 Lead  Date/Time: 5/23/2018 8:49 AM  Performed by: RUBY WHELEER  Authorized by: RUBY WHEELER   Comparison: compared with previous ECG from 4/11/2018  Similar to previous ECG  Rhythm: atrial fibrillation  Rate: normal  BPM: 84  Conduction: conduction normal  ST Segments: ST segments normal  T Waves: T waves normal  QRS axis: normal  Other: no other findings  Clinical impression: abnormal ECG              Assessment:       Diagnosis Plan   1. Cardiomyopathy, unspecified type     2. Persistent atrial fibrillation     3. Warfarin anticoagulation     4. Status post mitral valve replacement     5. Pulmonary hypertension      6. Tricuspid valve insufficiency, unspecified etiology     7. Depression, unspecified depression type            Plan:       1. Cardiomyopathy: She had an echocardiogram earlier today and will reassess the ejection fraction.  Overall she is feeling much better.  We'll continue with her current dosage of Toprol XL.    2.  Persistent Atrial Fibrillation: She remains in atrial fibrillation and is asymptomatic.  She continue with digoxin, Toprol-XL, and warfarin.  Her INR levels are followed here at our office and she denies any bleeding.    3.  Mitral Valve Stenosis/Status Post Mitral Valve Replacement: Mitral valve was stable on last echocardiogram.    4.  Pulmonary Hypertension/Tricuspid Valve Insufficiency: We will reassess on repeat echocardiogram today.    5.  Depression: Overall she is feeling better and sleeping better.      As always, it has been a pleasure to participate in your patient's care.      Sincerely,         MONICA Sanchez

## 2018-05-24 ENCOUNTER — TELEPHONE (OUTPATIENT)
Dept: CARDIOLOGY | Facility: CLINIC | Age: 63
End: 2018-05-24

## 2018-05-24 LAB
ASCENDING AORTA: 3.1 CM
BH CV ECHO MEAS - ACS: 1.8 CM
BH CV ECHO MEAS - AI MAX PG: 7.4 MMHG
BH CV ECHO MEAS - AI MAX VEL: 136 CM/SEC
BH CV ECHO MEAS - AO MAX PG (FULL): 3.9 MMHG
BH CV ECHO MEAS - AO MAX PG: 7 MMHG
BH CV ECHO MEAS - AO MEAN PG (FULL): 1.8 MMHG
BH CV ECHO MEAS - AO MEAN PG: 3.5 MMHG
BH CV ECHO MEAS - AO ROOT AREA (BSA CORRECTED): 2.2
BH CV ECHO MEAS - AO ROOT AREA: 8.4 CM^2
BH CV ECHO MEAS - AO ROOT DIAM: 3.3 CM
BH CV ECHO MEAS - AO V2 MAX: 132.1 CM/SEC
BH CV ECHO MEAS - AO V2 MEAN: 87.2 CM/SEC
BH CV ECHO MEAS - AO V2 VTI: 27.6 CM
BH CV ECHO MEAS - AVA(I,A): 1.5 CM^2
BH CV ECHO MEAS - AVA(I,D): 1.5 CM^2
BH CV ECHO MEAS - AVA(V,A): 1.6 CM^2
BH CV ECHO MEAS - AVA(V,D): 1.6 CM^2
BH CV ECHO MEAS - BSA(HAYCOCK): 1.5 M^2
BH CV ECHO MEAS - BSA: 1.5 M^2
BH CV ECHO MEAS - BZI_BMI: 20.5 KILOGRAMS/M^2
BH CV ECHO MEAS - BZI_METRIC_HEIGHT: 157.5 CM
BH CV ECHO MEAS - BZI_METRIC_WEIGHT: 50.8 KG
BH CV ECHO MEAS - CONTRAST EF (2CH): 27.1 ML/M^2
BH CV ECHO MEAS - CONTRAST EF 4CH: 32.3 ML/M^2
BH CV ECHO MEAS - EDV(MOD-SP2): 85 ML
BH CV ECHO MEAS - EDV(MOD-SP4): 93 ML
BH CV ECHO MEAS - EDV(TEICH): 117.1 ML
BH CV ECHO MEAS - EF(CUBED): 54.6 %
BH CV ECHO MEAS - EF(MOD-BP): 30 %
BH CV ECHO MEAS - EF(MOD-SP2): 27.1 %
BH CV ECHO MEAS - EF(MOD-SP4): 32.3 %
BH CV ECHO MEAS - EF(TEICH): 46.2 %
BH CV ECHO MEAS - ESV(MOD-SP2): 62 ML
BH CV ECHO MEAS - ESV(MOD-SP4): 63 ML
BH CV ECHO MEAS - ESV(TEICH): 62.9 ML
BH CV ECHO MEAS - FS: 23.2 %
BH CV ECHO MEAS - IVS/LVPW: 1
BH CV ECHO MEAS - IVSD: 0.76 CM
BH CV ECHO MEAS - LAT PEAK E' VEL: 9 CM/SEC
BH CV ECHO MEAS - LV DIASTOLIC VOL/BSA (35-75): 62.2 ML/M^2
BH CV ECHO MEAS - LV MASS(C)D: 126.1 GRAMS
BH CV ECHO MEAS - LV MASS(C)DI: 84.4 GRAMS/M^2
BH CV ECHO MEAS - LV MAX PG: 3.1 MMHG
BH CV ECHO MEAS - LV MEAN PG: 1.7 MMHG
BH CV ECHO MEAS - LV SYSTOLIC VOL/BSA (12-30): 42.2 ML/M^2
BH CV ECHO MEAS - LV V1 MAX: 87.8 CM/SEC
BH CV ECHO MEAS - LV V1 MEAN: 60.8 CM/SEC
BH CV ECHO MEAS - LV V1 VTI: 16.7 CM
BH CV ECHO MEAS - LVIDD: 5 CM
BH CV ECHO MEAS - LVIDS: 3.8 CM
BH CV ECHO MEAS - LVLD AP2: 7.1 CM
BH CV ECHO MEAS - LVLD AP4: 7 CM
BH CV ECHO MEAS - LVLS AP2: 6.7 CM
BH CV ECHO MEAS - LVLS AP4: 6.5 CM
BH CV ECHO MEAS - LVOT AREA (M): 2.5 CM^2
BH CV ECHO MEAS - LVOT AREA: 2.4 CM^2
BH CV ECHO MEAS - LVOT DIAM: 1.8 CM
BH CV ECHO MEAS - LVPWD: 0.76 CM
BH CV ECHO MEAS - MED PEAK E' VEL: 7 CM/SEC
BH CV ECHO MEAS - MR MAX PG: 75.5 MMHG
BH CV ECHO MEAS - MR MAX VEL: 434.4 CM/SEC
BH CV ECHO MEAS - MV DEC SLOPE: 438.3 CM/SEC^2
BH CV ECHO MEAS - MV DEC TIME: 0.17 SEC
BH CV ECHO MEAS - MV E MAX VEL: 128.1 CM/SEC
BH CV ECHO MEAS - MV MAX PG: 6.9 MMHG
BH CV ECHO MEAS - MV MEAN PG: 1.9 MMHG
BH CV ECHO MEAS - MV P1/2T MAX VEL: 132.7 CM/SEC
BH CV ECHO MEAS - MV P1/2T: 88.7 MSEC
BH CV ECHO MEAS - MV V2 MAX: 130.9 CM/SEC
BH CV ECHO MEAS - MV V2 MEAN: 61.1 CM/SEC
BH CV ECHO MEAS - MV V2 VTI: 28 CM
BH CV ECHO MEAS - MVA P1/2T LCG: 1.7 CM^2
BH CV ECHO MEAS - MVA(P1/2T): 2.5 CM^2
BH CV ECHO MEAS - MVA(VTI): 1.5 CM^2
BH CV ECHO MEAS - PA ACC TIME: 0.12 SEC
BH CV ECHO MEAS - PA MAX PG (FULL): 3.1 MMHG
BH CV ECHO MEAS - PA MAX PG: 3.7 MMHG
BH CV ECHO MEAS - PA PR(ACCEL): 26.7 MMHG
BH CV ECHO MEAS - PA V2 MAX: 96 CM/SEC
BH CV ECHO MEAS - PULM DIAS VEL: 60.6 CM/SEC
BH CV ECHO MEAS - PULM S/D: 0.4
BH CV ECHO MEAS - PULM SYS VEL: 24.3 CM/SEC
BH CV ECHO MEAS - PVA(V,A): 1.4 CM^2
BH CV ECHO MEAS - PVA(V,D): 1.4 CM^2
BH CV ECHO MEAS - QP/QS: 0.66
BH CV ECHO MEAS - RAP SYSTOLE: 8 MMHG
BH CV ECHO MEAS - RV MAX PG: 0.59 MMHG
BH CV ECHO MEAS - RV MEAN PG: 0.36 MMHG
BH CV ECHO MEAS - RV V1 MAX: 38.3 CM/SEC
BH CV ECHO MEAS - RV V1 MEAN: 28.7 CM/SEC
BH CV ECHO MEAS - RV V1 VTI: 7.6 CM
BH CV ECHO MEAS - RVOT AREA: 3.5 CM^2
BH CV ECHO MEAS - RVOT DIAM: 2.1 CM
BH CV ECHO MEAS - RVSP: 33 MMHG
BH CV ECHO MEAS - SI(AO): 156 ML/M^2
BH CV ECHO MEAS - SI(CUBED): 45.1 ML/M^2
BH CV ECHO MEAS - SI(LVOT): 27.3 ML/M^2
BH CV ECHO MEAS - SI(MOD-SP2): 15.4 ML/M^2
BH CV ECHO MEAS - SI(MOD-SP4): 20.1 ML/M^2
BH CV ECHO MEAS - SI(TEICH): 36.2 ML/M^2
BH CV ECHO MEAS - SUP REN AO DIAM: 1.6 CM
BH CV ECHO MEAS - SV(AO): 233.1 ML
BH CV ECHO MEAS - SV(CUBED): 67.4 ML
BH CV ECHO MEAS - SV(LVOT): 40.8 ML
BH CV ECHO MEAS - SV(MOD-SP2): 23 ML
BH CV ECHO MEAS - SV(MOD-SP4): 30 ML
BH CV ECHO MEAS - SV(RVOT): 26.9 ML
BH CV ECHO MEAS - SV(TEICH): 54.1 ML
BH CV ECHO MEAS - TAPSE (>1.6): 1.7 CM2
BH CV ECHO MEAS - TR MAX VEL: 252.5 CM/SEC
BH CV ECHO MEASUREMENTS AVERAGE E/E' RATIO: 16.01
BH CV XLRA - RV BASE: 3.2 CM
BH CV XLRA - TDI S': 9 CM/SEC
LEFT ATRIUM VOLUME INDEX: 50 ML/M2
LV EF 2D ECHO EST: 30 %
MAXIMAL PREDICTED HEART RATE: 156 BPM
SINUS: 3.4 CM
STJ: 2.1 CM
STRESS TARGET HR: 133 BPM

## 2018-05-24 NOTE — TELEPHONE ENCOUNTER
----- Message from MONICA Novak sent at 5/23/2018  9:27 AM EDT -----    Follow up on echo  Make f/u appt

## 2018-05-24 NOTE — TELEPHONE ENCOUNTER
Echocardiogram Interpretation Summary     · Calculated EF = 30%. Estimated EF was in agreement with the calculated EF. Estimated EF = 30%. Global Longitudinal LV strain = -12%. Strain data was reviewed and speckle tracking was considered accurate. There is a degree of apical sparing noted on the longitudinal global left ventricular strain image which raises the question of possible amyloid heart disease. Clinical correlation recommended.  · Normal left ventricular wall thickness noted. There is left ventricular global hypokinesis noted. The left ventricular cavity is mild-to-moderately dilated. Left ventricular diastolic function was indeterminate.  · Left atrial volume is severely increased.  · Right atrial cavity size is severely dilated.  · The aortic valve is abnormal in structure. There is mild thickening of the aortic valve  · The mitral valve area (PHT) is 2.5 cm2. There is a Medtronic mechanical mitral valve prosthesis present. There is significant shadowing from the prosthetic valve. No obvious mass, regurgitation or stenosis noted. The prosthetic valve is grossly normal. Mitral valve replacement with 31 mm Medtronic mechanical valve in 2015.  · Trace tricuspid valve regurgitation is present. Estimated right ventricular systolic pressure from tricuspid regurgitation is normal (<35 mmHg). Calculated right ventricular systolic pressure from tricuspid regurgitation is 33 mmHg.        Dr. Vargas reviewed the echocardiogram and feels that her EF is greater than 30%.  She has recommended that she continue with her current medication regimen and follow up in 3-4 months.     I have left a message for Mrs. Lehman to return my call

## 2018-06-06 ENCOUNTER — HOSPITAL ENCOUNTER (OUTPATIENT)
Dept: CARDIOLOGY | Facility: HOSPITAL | Age: 63
Setting detail: RECURRING SERIES
Discharge: HOME | End: 2018-06-06

## 2018-06-06 PROCEDURE — 36416 COLLJ CAPILLARY BLOOD SPEC: CPT

## 2018-06-06 PROCEDURE — 85610 PROTHROMBIN TIME: CPT

## 2018-07-02 RX ORDER — DIGOXIN 125 UG/1
TABLET ORAL
Qty: 30 TABLET | Refills: 2 | Status: SHIPPED | OUTPATIENT
Start: 2018-07-02 | End: 2018-08-22 | Stop reason: SDUPTHER

## 2018-07-02 RX ORDER — DIGOXIN 125 UG/1
TABLET ORAL
Qty: 30 TABLET | Refills: 2 | Status: SHIPPED | OUTPATIENT
Start: 2018-07-02 | End: 2018-09-30 | Stop reason: SDUPTHER

## 2018-07-05 ENCOUNTER — HOSPITAL ENCOUNTER (OUTPATIENT)
Dept: CARDIOLOGY | Facility: HOSPITAL | Age: 63
Setting detail: RECURRING SERIES
Discharge: HOME | End: 2018-07-05

## 2018-07-05 PROCEDURE — 36416 COLLJ CAPILLARY BLOOD SPEC: CPT

## 2018-07-05 PROCEDURE — 85610 PROTHROMBIN TIME: CPT

## 2018-07-20 ENCOUNTER — HOSPITAL ENCOUNTER (OUTPATIENT)
Dept: CARDIOLOGY | Facility: HOSPITAL | Age: 63
Setting detail: RECURRING SERIES
Discharge: HOME | End: 2018-07-20

## 2018-07-20 PROCEDURE — 36416 COLLJ CAPILLARY BLOOD SPEC: CPT

## 2018-07-20 PROCEDURE — 85610 PROTHROMBIN TIME: CPT

## 2018-07-30 RX ORDER — WARFARIN SODIUM 5 MG/1
TABLET ORAL
Qty: 45 TABLET | Refills: 2 | Status: SHIPPED | OUTPATIENT
Start: 2018-07-30 | End: 2018-11-05 | Stop reason: SDUPTHER

## 2018-08-22 ENCOUNTER — HOSPITAL ENCOUNTER (OUTPATIENT)
Dept: CARDIOLOGY | Facility: HOSPITAL | Age: 63
Setting detail: RECURRING SERIES
Discharge: HOME | End: 2018-08-22

## 2018-08-22 ENCOUNTER — OFFICE VISIT (OUTPATIENT)
Dept: CARDIOLOGY | Facility: CLINIC | Age: 63
End: 2018-08-22

## 2018-08-22 VITALS
DIASTOLIC BLOOD PRESSURE: 78 MMHG | SYSTOLIC BLOOD PRESSURE: 140 MMHG | HEART RATE: 75 BPM | WEIGHT: 109.8 LBS | HEIGHT: 62 IN | BODY MASS INDEX: 20.2 KG/M2

## 2018-08-22 DIAGNOSIS — I42.9 CARDIOMYOPATHY, UNSPECIFIED TYPE (HCC): ICD-10-CM

## 2018-08-22 DIAGNOSIS — Z79.01 WARFARIN ANTICOAGULATION: ICD-10-CM

## 2018-08-22 DIAGNOSIS — I48.19 PERSISTENT ATRIAL FIBRILLATION (HCC): ICD-10-CM

## 2018-08-22 DIAGNOSIS — Z95.2 STATUS POST MITRAL VALVE REPLACEMENT: Primary | ICD-10-CM

## 2018-08-22 PROCEDURE — 36416 COLLJ CAPILLARY BLOOD SPEC: CPT

## 2018-08-22 PROCEDURE — 99214 OFFICE O/P EST MOD 30 MIN: CPT | Performed by: INTERNAL MEDICINE

## 2018-08-22 PROCEDURE — 93000 ELECTROCARDIOGRAM COMPLETE: CPT | Performed by: INTERNAL MEDICINE

## 2018-08-22 PROCEDURE — 85610 PROTHROMBIN TIME: CPT

## 2018-08-22 RX ORDER — LOSARTAN POTASSIUM 25 MG/1
25 TABLET ORAL NIGHTLY
Qty: 90 TABLET | Refills: 3 | Status: SHIPPED | OUTPATIENT
Start: 2018-08-22 | End: 2019-08-02 | Stop reason: SDUPTHER

## 2018-08-22 NOTE — PROGRESS NOTES
Date of Office Visit: 2018  Encounter Provider: Lindsey Vargas MD  Place of Service: [facility] CARDIOLOGY  Patient Name: Yana Lehman  :1955      Patient ID:  Yana Lehman is a 63 y.o. female is here for  followup for         History of Present Illness    She came in through the emergency department on  06/10/2015 with atrial fibrillation and rapid ventricular response.  At that time, she was  tachycardic and hypotensive.  Her cardiac output was very poor.  Her hands and feet were  cool.  Her skin was overall kind of dusky and almost jaundice in appearance.  She was from  a cardiovascular standpoint very unstable.  A stat echocardiogram was ordered and she was  started on fluid boluses.  Her echocardiogram showed an ejection fraction of 50% to 55%  with severe left atrial dilation, severe right ventricular dilation, severe reduction of  right ventricular systolic function, severe right atrial dilation, trace to mild aortic  insufficiency, severely thickened mitral leaflets with grade 3 mobility of the leaflets  and severe mitral stenosis.  The mean gradient across the valve was 14 mmHg.  Her right  ventricular systolic pressure was 94 mmHg and there was moderate tricuspid insufficiency.   An emergent KITTY was done which showed severe left atrial dilation, severely dilated right  ventricle with severe reduction of right ventricular systolic function, severe mitral  stenosis, mild to moderate mitral insufficiency, and moderate tricuspid insufficiency.   She had a cardiac catheterization done on 06/10/2015 showing normal coronary arteries,  moderate to severe pulmonary hypertension, mildly depressed cardiac output, and severely  elevated peripheral vascular resistance.  Her PA pressure was a mean of 55 mmHg.  The RV  pressure was 63/3.  The right atrial pressure was 25.  Dr. Márquez saw her immediately and  took her to the operating room where she had emergency  mitral valve replacement with a 31  mm Medtronic mechanical valve.  Preservation of the papillary muscle with two Petoskey-Librado  Neochord.       She presented to [facility] on 3/4/18 and was noted to have been noncompliant for several years due to depression in social situations. She stopped many of her medications, but did remain on her warfarin.  She presented with fatigue and palpitations.  Her pro BNP was greater than 14,000 and she was noted to be in atrial fibrillation with rapid ventricular response.  She had a 2-D echocardiogram completed which revealed calculated EF of 32% and estimated EF of 21-25%, left ventricular cavity mild to moderately dilated, wall motion abnormalities, left atrium and right ventricle cavity moderately dilated, mechanical mitral valve prosthesis present and grossly normal, moderate tricuspid valve regurgitation, and mild pulmonary hypertension with RVSP of 41 mmHg.  She was noted to have a chads 2 VASC score of 2 and was recommended to start carvedilol, digoxin, furosemide, potassium, and warfarin.       An echocardiogram done 5/23/18 showing ejection fraction of 30%, global longitudinal strain of -12%, global hypokinesis, mild to moderate left ventricular dilation, severe biatrial enlargement, mechanical Medtronic valve which is grossly normal, RVSP of 33 mmHg.    She has no chest pain, tachycardia, weakness, orthopnea, edema, PND.  She's had no dizziness or syncope.  She is taking her medications as directed.  She is overall felt well.       Past Medical History:   Diagnosis Date   • Acute bronchitis    • Acute kidney injury (CMS/HCC)     after surgery   • Acute systolic congestive heart failure (CMS/HCC) 3/5/2018   • Cachexia (CMS/HCC)    • Cardiomyopathy (CMS/HCC)     unspecified type   • Depression    • H/O shortness of breath    • Mitral valve stenosis     severe; s/p mitral valve replacement    • Persistent atrial fibrillation (CMS/HCC)     on Toprol and warfarin   •  Pneumothorax    • Pulmonary hypertension    • Rheumatic fever    • Sinus tachycardia    • Tricuspid valve insufficiency 3/15/2018   • Warfarin anticoagulation 03/15/2018    followed by [facility]R clinic         Past Surgical History:   Procedure Laterality Date   • CARDIAC CATHETERIZATION      procedure outcome: successful   • FOOT SURGERY     • MITRAL VALVE REPLACEMENT  06/2015    Subhash Márquez   • MITRAL VALVE REPLACEMENT     • TONSILLECTOMY         Current Outpatient Prescriptions on File Prior to Visit   Medication Sig Dispense Refill   • DIGOX 125 MCG tablet TAKE ONE TABLET BY MOUTH DAILY 30 tablet 2   • metoprolol succinate XL (TOPROL-XL) 25 MG 24 hr tablet Take 1 tablet by mouth 2 (Two) Times a Day. 180 tablet 3   • warfarin (COUMADIN) 5 MG tablet Take 1 tablet on Sat and Sun and 1.5 tablets on all other days as directed 45 tablet 2   • [DISCONTINUED] DIGOX 125 MCG tablet TAKE ONE TABLET BY MOUTH DAILY 30 tablet 2     No current facility-administered medications on file prior to visit.        Social History     Social History   • Marital status: Single     Spouse name: N/A   • Number of children: N/A   • Years of education: N/A     Occupational History   • Not on file.     Social History Main Topics   • Smoking status: Former Smoker     Quit date: 2/18/2018   • Smokeless tobacco: Never Used      Comment: caffeine use   • Alcohol use No   • Drug use: No   • Sexual activity: Not on file     Other Topics Concern   • Not on file     Social History Narrative   • No narrative on file           Review of Systems   Constitution: Negative.   HENT: Negative for congestion.    Eyes: Negative for vision loss in left eye and vision loss in right eye.   Respiratory: Negative.  Negative for cough, hemoptysis, shortness of breath, sleep disturbances due to breathing, snoring, sputum production and wheezing.    Endocrine: Negative.    Hematologic/Lymphatic: Negative.    Skin: Negative for poor wound healing and rash.  "  Musculoskeletal: Negative for falls, gout, muscle cramps and myalgias.   Gastrointestinal: Negative for abdominal pain, diarrhea, dysphagia, hematemesis, melena, nausea and vomiting.   Neurological: Negative for excessive daytime sleepiness, dizziness, headaches, light-headedness, loss of balance, seizures and vertigo.   Psychiatric/Behavioral: Negative for depression and substance abuse. The patient is not nervous/anxious.        Procedures    ECG 12 Lead  Date/Time: 8/22/2018 10:52 AM  Performed by: HARJINDER CHAVARRIA  Authorized by: HARJINDER CHAVARRIA   Comparison: compared with previous ECG   Similar to previous ECG  Rhythm: atrial fibrillation  Clinical impression: abnormal ECG                Objective:      Vitals:    08/22/18 1018   BP: 140/78   BP Location: Right arm   Patient Position: Sitting   Pulse: 75   Weight: 49.8 kg (109 lb 12.8 oz)   Height: 157.5 cm (62\")     Body mass index is 20.08 kg/m².    Physical Exam   Constitutional: She is oriented to person, place, and time. She appears well-developed and well-nourished. No distress.   HENT:   Head: Normocephalic and atraumatic.   Eyes: Conjunctivae are normal. No scleral icterus.   Neck: Neck supple. No JVD present. Carotid bruit is not present. No thyromegaly present.   Cardiovascular: Normal rate, S1 normal, S2 normal, normal heart sounds and intact distal pulses.  An irregularly irregular rhythm present.  No extrasystoles are present. PMI is not displaced.    No murmur heard.  Pulses:       Carotid pulses are 2+ on the right side, and 2+ on the left side.       Radial pulses are 2+ on the right side, and 2+ on the left side.        Dorsalis pedis pulses are 2+ on the right side, and 2+ on the left side.        Posterior tibial pulses are 2+ on the right side, and 2+ on the left side.   Mechanical click   Pulmonary/Chest: Effort normal and breath sounds normal. No respiratory distress. She has no wheezes. She has no rhonchi. She has no rales. She " exhibits no tenderness.   Abdominal: Soft. Bowel sounds are normal. She exhibits no distension, no abdominal bruit and no mass. There is no tenderness.   Musculoskeletal: She exhibits no edema or deformity.   Lymphadenopathy:     She has no cervical adenopathy.   Neurological: She is alert and oriented to person, place, and time. No cranial nerve deficit.   Skin: Skin is warm and dry. No rash noted. She is not diaphoretic. No cyanosis. No pallor. Nails show no clubbing.   Psychiatric: She has a normal mood and affect. Judgment normal.   Vitals reviewed.      Lab Review:       Assessment:      Diagnosis Plan   1. Status post mitral valve replacement     2. Persistent atrial fibrillation (CMS/HCC)  Stress Test With Myocardial Perfusion One Day   3. Warfarin anticoagulation     4. Cardiomyopathy, unspecified type (CMS/HCC)  Stress Test With Myocardial Perfusion One Day     1. Cardiomyopathy - persistent despite heart rate being controlled. Set up stress nuclear to exclude ischemic cardiomyopathy.   2. Atrial fibrillation - on warfarin, heart rate better  3. S/p MV replacement, mechanical.   4. Pulmonary HTN, resolved.   5. depression     Plan:       Start losartan 25mg daily. See wang in 3 months.       Atrial Fibrillation and Atrial Flutter  Assessment  • The patient has permanent atrial fibrillation  • This is valvular in etiology  • The patient's CHADS2-VASc score is 2  • A [id number] score of 2 or more is considered a high risk for a thromboembolic event  • Warfarin prescribed    Plan  • Continue in atrial fibrillation with rate control  • Continue warfarin for antithrombotic therapy, bleeding issues discussed  • Continue beta blocker and digoxin for rate control      Heart Failure  Assessment  • NYHA class II - There is slight limitation of physical activity. The patient is comfortable at rest, but physical activity results in fatigue, palpitations or shortness of breath.  • Beta blocker prescribed  •  Angiotensin receptor blocker (ARB) prescribed  • Left ventricular function is moderately reduced by qualitative assessment    Plan  • The heart failure care plan was discussed with the patient today including: up-titrating HF medications    Subjective/Objective  • Physical exam findings positive for peripheral edema.   • Physical exam findings negative for rales and elevated JVP.

## 2018-09-05 ENCOUNTER — HOSPITAL ENCOUNTER (OUTPATIENT)
Dept: CARDIOLOGY | Facility: HOSPITAL | Age: 63
Discharge: HOME | End: 2018-09-05
Attending: INTERNAL MEDICINE | Admitting: INTERNAL MEDICINE

## 2018-09-05 VITALS — HEIGHT: 62 IN | BODY MASS INDEX: 19.88 KG/M2 | WEIGHT: 108 LBS

## 2018-09-05 DIAGNOSIS — I48.19 PERSISTENT ATRIAL FIBRILLATION (HCC): ICD-10-CM

## 2018-09-05 DIAGNOSIS — I42.9 CARDIOMYOPATHY, UNSPECIFIED TYPE (HCC): ICD-10-CM

## 2018-09-05 LAB
BH CV NUCLEAR PRIOR STUDY: 2
BH CV STRESS BP STAGE 1: NORMAL
BH CV STRESS COMMENTS STAGE 1: NORMAL
BH CV STRESS DOSE REGADENOSON STAGE 1: 0.4
BH CV STRESS DURATION MIN STAGE 1: 0
BH CV STRESS DURATION SEC STAGE 1: 10
BH CV STRESS HR STAGE 1: 142
BH CV STRESS PROTOCOL 1: NORMAL
BH CV STRESS RECOVERY BP: NORMAL MMHG
BH CV STRESS RECOVERY HR: 94 BPM
BH CV STRESS STAGE 1: 1
LV EF NUC BP: 57 %
MAXIMAL PREDICTED HEART RATE: 156 BPM
PERCENT MAX PREDICTED HR: 91.03 %
STRESS BASELINE BP: NORMAL MMHG
STRESS BASELINE HR: 82 BPM
STRESS PERCENT HR: 107 %
STRESS POST EXERCISE DUR SEC: 10 SEC
STRESS POST PEAK BP: NORMAL MMHG
STRESS POST PEAK HR: 142 BPM
STRESS TARGET HR: 133 BPM

## 2018-09-05 PROCEDURE — 93017 CV STRESS TEST TRACING ONLY: CPT

## 2018-09-05 PROCEDURE — 93018 CV STRESS TEST I&R ONLY: CPT | Performed by: INTERNAL MEDICINE

## 2018-09-05 PROCEDURE — 78452 HT MUSCLE IMAGE SPECT MULT: CPT

## 2018-09-05 PROCEDURE — 93016 CV STRESS TEST SUPVJ ONLY: CPT | Performed by: INTERNAL MEDICINE

## 2018-09-05 PROCEDURE — 0 TECHNETIUM TETROFOSMIN KIT: Performed by: INTERNAL MEDICINE

## 2018-09-05 PROCEDURE — 25010000002 REGADENOSON 0.4 MG/5ML SOLUTION: Performed by: INTERNAL MEDICINE

## 2018-09-05 PROCEDURE — 78452 HT MUSCLE IMAGE SPECT MULT: CPT | Performed by: INTERNAL MEDICINE

## 2018-09-05 PROCEDURE — A9502 TC99M TETROFOSMIN: HCPCS | Performed by: INTERNAL MEDICINE

## 2018-09-05 RX ADMIN — TETROFOSMIN 1 DOSE: 1.38 INJECTION, POWDER, LYOPHILIZED, FOR SOLUTION INTRAVENOUS at 12:28

## 2018-09-05 RX ADMIN — REGADENOSON 0.4 MG: 0.08 INJECTION, SOLUTION INTRAVENOUS at 12:28

## 2018-09-05 RX ADMIN — TETROFOSMIN 1 DOSE: 1.38 INJECTION, POWDER, LYOPHILIZED, FOR SOLUTION INTRAVENOUS at 11:43

## 2018-09-24 ENCOUNTER — ANTICOAGULATION VISIT (OUTPATIENT)
Dept: PHARMACY | Facility: HOSPITAL | Age: 63
End: 2018-09-24

## 2018-09-24 DIAGNOSIS — Z79.01 WARFARIN ANTICOAGULATION: ICD-10-CM

## 2018-09-24 DIAGNOSIS — Z95.2 STATUS POST MITRAL VALVE REPLACEMENT: ICD-10-CM

## 2018-09-24 LAB
INR PPP: 2.3 (ref 0.91–1.09)
PROTHROMBIN TIME: 28.1 SECONDS (ref 10–13.8)

## 2018-09-24 PROCEDURE — 85610 PROTHROMBIN TIME: CPT

## 2018-09-24 PROCEDURE — G0463 HOSPITAL OUTPT CLINIC VISIT: HCPCS | Performed by: PHARMACIST

## 2018-09-24 PROCEDURE — 36416 COLLJ CAPILLARY BLOOD SPEC: CPT

## 2018-09-24 NOTE — PROGRESS NOTES
Anticoagulation Clinic Progress Note  Anticoagulation Summary  As of 2018    INR goal:   2.5-3.5   TTR:   --   Today's INR:   2.3!   Warfarin maintenance plan:   5 mg on Sun, Thu; 7.5 mg all other days   Weekly warfarin total:   47.5 mg   Plan last modified:   Nicolasa Kennedy RPH (2018)   Next INR check:   10/11/2018   Priority:   High   Target end date:   Indefinite    Indications    Status post mitral valve replacement [Z95.2]  Warfarin anticoagulation [Z79.01]             Anticoagulation Episode Summary     INR check location:       Preferred lab:       Send INR reminders to:   [facility]    Comments:         Anticoagulation Care Providers     Provider Role Specialty Phone number    Lindsey Vargas MD Referring Cardiology [phone]    Nicolasa Kennedy RPH Responsible Pharmacy             Drug interactions: No new interactions  Diet: Consistent    Clinic Interview:  Patient Findings     Positives:   Change in medications    Negatives:   Signs/symptoms of thrombosis, Signs/symptoms of bleeding,   Laboratory test error suspected, Change in health, Change in alcohol use,   Change in activity, Upcoming invasive procedure, Emergency department   visit, Upcoming dental procedure, Missed doses, Extra doses, Change in   diet/appetite, Hospital admission, Bruising, Other complaints    Comments:   Started losartan      Clinical Outcomes     Negatives:   Major bleeding event, Thromboembolic event,   Anticoagulation-related hospital admission, Anticoagulation-related ED   visit, Anticoagulation-related fatality    Comments:   Started losartan        Education:  Yana Lehman is a new start in the [facility]. We discussed the followin) Warfarin's indication, mechanism, and dosing  2) Enforced the importance of taking warfarin as instructed and at the same time every day, preferably in the evening so that we can make dose adjustments more easily following  subsequent clinic visits  3) What she should do about a missed dose; pts can take missed doses within about 12 hours of their usual scheduled dose, but she was instructed on the importance of not doubling up on doses unless told to do so by the [facility]  4) Explained possible side effects of warfarin therapy, including increased risk of bleeding, s/sx of bleeding and s/sx of any additional clots/PE/CVA.   5) Discussed monitoring of warfarin, the INR, goal INR range, and the frequency of monitoring  6) Reviewed drug/food/tobacco/EtOH interactions and provided written information covering these topics in more detail, explaining that green, leafy vegetables interact most heavily with warfarin  7) Instructed the pt not to take or discontinue any medications without informing her physician/pharmacist and reminded her to inform us of any dietary changes, as well  8) Explained that she would be coming into the clinic more frequently in these first few weeks of therapy as we try to adjust her dose and achieve a therapeutic INR x 2 consecutive readings. Once that is achieved, patient will follow up in clinic every 4 weeks, on average.    She stated no problems with transportation or scheduling clinic appts in this manner. she expressed understanding of the information provided and has no additional questions at this time.    Yana Lehman was presented with a copy of the Patients Rights and Responsibilities. she expressed verbal consent and agreement to receive care in the [facility] under the current collaborative care agreement with [facility].       INR History:  Previous INR data from [facility] is recorded in [facility] and scanned into the patient's chart in [facility]. These results have been analyzed and reviewed.      Plan:  1. INR is Subtherapeutic today- see above in Anticoagulation Summary.   Will instruct Yana Lehman to Continue their warfarin  regimen- see above in Anticoagulation Summary.  2. Follow up in 2 weeks  3. Patient declines warfarin refills.  4. Verbal and written information provided. Patient expresses understanding and has no further questions at this time.    Nicolasa Kennedy [facility]

## 2018-10-01 RX ORDER — DIGOXIN 125 MCG
TABLET ORAL
Qty: 30 TABLET | Refills: 5 | Status: SHIPPED | OUTPATIENT
Start: 2018-10-01 | End: 2019-08-01 | Stop reason: SDUPTHER

## 2018-10-11 ENCOUNTER — ANTICOAGULATION VISIT (OUTPATIENT)
Dept: PHARMACY | Facility: HOSPITAL | Age: 63
End: 2018-10-11

## 2018-10-11 DIAGNOSIS — Z95.2 STATUS POST MITRAL VALVE REPLACEMENT: ICD-10-CM

## 2018-10-11 DIAGNOSIS — Z79.01 WARFARIN ANTICOAGULATION: ICD-10-CM

## 2018-10-11 LAB
INR PPP: 2.8 (ref 0.91–1.09)
PROTHROMBIN TIME: 34 SECONDS (ref 10–13.8)

## 2018-10-11 PROCEDURE — 36416 COLLJ CAPILLARY BLOOD SPEC: CPT

## 2018-10-11 PROCEDURE — 85610 PROTHROMBIN TIME: CPT

## 2018-10-11 NOTE — PROGRESS NOTES
Anticoagulation Clinic Progress Note    Anticoagulation Summary  As of 10/11/2018    INR goal:   2.5-3.5   TTR:   100.0 % (1 wk)   Today's INR:   2.8   Warfarin maintenance plan:   5 mg on Sun, Thu; 7.5 mg all other days   Weekly warfarin total:   47.5 mg   No change documented:   Rita Bobby   Plan last modified:   Nicolasa Kennedy RPH (9/24/2018)   Next INR check:   10/25/2018   Priority:   High   Target end date:   Indefinite    Indications    Status post mitral valve replacement [Z95.2]  Warfarin anticoagulation [Z79.01]             Anticoagulation Episode Summary     INR check location:       Preferred lab:       Send INR reminders to:    [facility]    Comments:         Anticoagulation Care Providers     Provider Role Specialty Phone number    Lindsey Vargas MD Referring Cardiology [phone]    Nicolasa Kennedy RPH Responsible Pharmacy           Drug interactions: has remained unchanged.  Diet: has remained unchanged.    Clinic Interview:  Patient Findings     Negatives:   Signs/symptoms of thrombosis, Signs/symptoms of bleeding,   Laboratory test error suspected, Change in health, Change in alcohol use,   Change in activity, Upcoming invasive procedure, Emergency department   visit, Upcoming dental procedure, Missed doses, Extra doses, Change in   medications, Change in diet/appetite, Hospital admission, Bruising, Other   complaints      Clinical Outcomes     Negatives:   Major bleeding event, Thromboembolic event,   Anticoagulation-related hospital admission, Anticoagulation-related ED   visit, Anticoagulation-related fatality        INR History:  Anticoagulation Monitoring 9/24/2018 10/11/2018   INR 2.3 2.8   INR Date 9/24/2018 10/11/2018   INR Goal 2.5-3.5 2.5-3.5   Trend - Same   Last Week Total 0 mg 47.5 mg   Next Week Total 47.5 mg 47.5 mg   Sun 5 mg 5 mg   Mon 7.5 mg 7.5 mg   Tue 7.5 mg 7.5 mg   Wed 7.5 mg 7.5 mg   Thu 5 mg 5 mg   Fri 7.5 mg 7.5 mg   Sat 7.5 mg 7.5 mg    Visit Report - -       Plan:  1. INR is therapeutic today- see above in Anticoagulation Summary.   Will instruct Yana Lehman to continue their warfarin regimen- see above in Anticoagulation Summary.  2. Follow up in 2 weeks.  3. Patient declines warfarin refills.  4. Verbal and written information provided. Patient expresses understanding and has no further questions at this time.    Rita Bobby

## 2018-10-25 ENCOUNTER — ANTICOAGULATION VISIT (OUTPATIENT)
Dept: PHARMACY | Facility: HOSPITAL | Age: 63
End: 2018-10-25

## 2018-10-25 DIAGNOSIS — Z79.01 WARFARIN ANTICOAGULATION: ICD-10-CM

## 2018-10-25 DIAGNOSIS — Z95.2 STATUS POST MITRAL VALVE REPLACEMENT: ICD-10-CM

## 2018-10-25 LAB
INR PPP: 1.9 (ref 0.91–1.09)
PROTHROMBIN TIME: 23 SECONDS (ref 10–13.8)

## 2018-10-25 PROCEDURE — 36416 COLLJ CAPILLARY BLOOD SPEC: CPT

## 2018-10-25 PROCEDURE — G0463 HOSPITAL OUTPT CLINIC VISIT: HCPCS | Performed by: PHARMACIST

## 2018-10-25 PROCEDURE — 85610 PROTHROMBIN TIME: CPT

## 2018-10-25 NOTE — PROGRESS NOTES
Anticoagulation Clinic Progress Note    Anticoagulation Summary  As of 10/25/2018    INR goal:   2.5-3.5   TTR:   56.7 % (3 wk)   Today's INR:   1.9!   Warfarin maintenance plan:   5 mg on Sun, Thu; 7.5 mg all other days   Weekly warfarin total:   47.5 mg   Plan last modified:   Nicolasa Kennedy RPH (9/24/2018)   Next INR check:   11/8/2018   Priority:   High   Target end date:   Indefinite    Indications    Status post mitral valve replacement [Z95.2]  Warfarin anticoagulation [Z79.01]             Anticoagulation Episode Summary     INR check location:       Preferred lab:       Send INR reminders to:   [facility]    Comments:         Anticoagulation Care Providers     Provider Role Specialty Phone number    Lindsey Vargas MD Referring Cardiology [phone]    Nicolasa Kennedy RPH Responsible Pharmacy           Drug interactions: has remained unchanged.  Diet: has remained unchanged.    Clinic Interview:  Patient Findings     Positives:   Missed doses    Negatives:   Signs/symptoms of thrombosis, Signs/symptoms of bleeding,   Laboratory test error suspected, Change in health, Change in alcohol use,   Change in activity, Upcoming invasive procedure, Emergency department   visit, Upcoming dental procedure, Extra doses, Change in medications,   Change in diet/appetite, Hospital admission, Bruising, Other complaints    Comments:   Missed her dose on Tuesday      Clinical Outcomes     Negatives:   Major bleeding event, Thromboembolic event,   Anticoagulation-related hospital admission, Anticoagulation-related ED   visit, Anticoagulation-related fatality    Comments:   Missed her dose on Tuesday        INR History:  Anticoagulation Monitoring 9/24/2018 10/11/2018 10/25/2018   INR 2.3 2.8 1.9   INR Date 9/24/2018 10/11/2018 10/25/2018   INR Goal 2.5-3.5 2.5-3.5 2.5-3.5   Trend - Same Same   Last Week Total 0 mg 47.5 mg 40 mg   Next Week Total 47.5 mg 47.5 mg 50 mg   Sun 5 mg 5 mg 5 mg   Mon  7.5 mg 7.5 mg 7.5 mg   Tue 7.5 mg 7.5 mg 7.5 mg   Wed 7.5 mg 7.5 mg 7.5 mg   Thu 5 mg 5 mg 7.5 mg (10/25); Otherwise 5 mg   Fri 7.5 mg 7.5 mg 7.5 mg   Sat 7.5 mg 7.5 mg 7.5 mg   Visit Report - - -   Some recent data might be hidden       Plan:  1. INR is Subtherapeutic today- see above in Anticoagulation Summary.  Will instruct Yana Lehman to Increase their warfarin regimen- see above in Anticoagulation Summary. Booster today for missed dose, then resume maintenance.  2. Follow up in 2 weeks  3. Patient declines warfarin refills.  4. Verbal and written information provided. Patient expresses understanding and has no further questions at this time.    Nicolasa Kennedy, [facility]

## 2018-11-05 RX ORDER — WARFARIN SODIUM 5 MG/1
TABLET ORAL
Qty: 41 TABLET | Refills: 0 | Status: SHIPPED | OUTPATIENT
Start: 2018-11-05 | End: 2018-12-05 | Stop reason: SDUPTHER

## 2018-11-08 ENCOUNTER — ANTICOAGULATION VISIT (OUTPATIENT)
Dept: PHARMACY | Facility: HOSPITAL | Age: 63
End: 2018-11-08

## 2018-11-08 DIAGNOSIS — Z79.01 WARFARIN ANTICOAGULATION: ICD-10-CM

## 2018-11-08 DIAGNOSIS — Z95.2 STATUS POST MITRAL VALVE REPLACEMENT: ICD-10-CM

## 2018-11-08 LAB
INR PPP: 2.5 (ref 0.91–1.09)
PROTHROMBIN TIME: 30.3 SECONDS (ref 10–13.8)

## 2018-11-08 PROCEDURE — 85610 PROTHROMBIN TIME: CPT

## 2018-11-08 PROCEDURE — 36416 COLLJ CAPILLARY BLOOD SPEC: CPT

## 2018-11-08 NOTE — PROGRESS NOTES
Anticoagulation Clinic Progress Note    Anticoagulation Summary  As of 11/8/2018    INR goal:   2.5-3.5   TTR:   34.4 % (1.2 mo)   Today's INR:   2.5   Warfarin maintenance plan:   5 mg on Sun, Thu; 7.5 mg all other days   Weekly warfarin total:   47.5 mg   No change documented:   Brisa Ya RPH   Plan last modified:   Nicolasa Kennedy RPH (9/24/2018)   Next INR check:   12/6/2018   Priority:   Maintenance   Target end date:   Indefinite    Indications    Status post mitral valve replacement [Z95.2]  Warfarin anticoagulation [Z79.01]             Anticoagulation Episode Summary     INR check location:       Preferred lab:       Send INR reminders to:    [facility]    Comments:         Anticoagulation Care Providers     Provider Role Specialty Phone number    Lindsey Vargas MD Referring Cardiology [phone]    Nicolasa Kennedy RPH Responsible Pharmacy           Drug interactions: has remained unchanged.  Diet: has remained unchanged.    Clinic Interview:  Patient Findings     Negatives:   Signs/symptoms of thrombosis, Signs/symptoms of bleeding,   Laboratory test error suspected, Change in health, Change in alcohol use,   Change in activity, Upcoming invasive procedure, Emergency department   visit, Upcoming dental procedure, Missed doses, Extra doses, Change in   medications, Change in diet/appetite, Hospital admission, Bruising, Other   complaints      Clinical Outcomes     Negatives:   Major bleeding event, Thromboembolic event,   Anticoagulation-related hospital admission, Anticoagulation-related ED   visit, Anticoagulation-related fatality        INR History:  Anticoagulation Monitoring 10/11/2018 10/25/2018 11/8/2018   INR 2.8 1.9 2.5   INR Date 10/11/2018 10/25/2018 11/8/2018   INR Goal 2.5-3.5 2.5-3.5 2.5-3.5   Trend Same Same Same   Last Week Total 47.5 mg 40 mg 47.5 mg   Next Week Total 47.5 mg 50 mg 47.5 mg   Sun 5 mg 5 mg 5 mg   Mon 7.5 mg 7.5 mg 7.5 mg   Tue 7.5 mg 7.5  mg 7.5 mg   Wed 7.5 mg 7.5 mg 7.5 mg   Thu 5 mg 7.5 mg (10/25); Otherwise 5 mg 5 mg   Fri 7.5 mg 7.5 mg 7.5 mg   Sat 7.5 mg 7.5 mg 7.5 mg   Visit Report - - -   Some recent data might be hidden       Previous INR data from [facility] is recorded in [facility] and scanned into the patient's chart in [facility]. These results have been analyzed and reviewed.      Plan:  1. INR is Therapeutic today- see above in Anticoagulation Summary.  Will instruct Yana Lehman to Continue their warfarin regimen- see above in Anticoagulation Summary.  11/8 INR= 2.5 Continue same Take 5mg on Sun and Thurs, take 7.5mg all other days. Follow up in 4 weeks  2. Follow up in 1 month  3. Patient declines warfarin refills.  4. Verbal and written information provided. Patient expresses understanding and has no further questions at this time.    Brisa Ya [facility]

## 2018-12-06 ENCOUNTER — ANTICOAGULATION VISIT (OUTPATIENT)
Dept: PHARMACY | Facility: HOSPITAL | Age: 63
End: 2018-12-06

## 2018-12-06 DIAGNOSIS — Z79.01 WARFARIN ANTICOAGULATION: ICD-10-CM

## 2018-12-06 DIAGNOSIS — Z95.2 STATUS POST MITRAL VALVE REPLACEMENT: ICD-10-CM

## 2018-12-06 LAB
INR PPP: 2.3 (ref 0.91–1.09)
PROTHROMBIN TIME: 27.1 SECONDS (ref 10–13.8)

## 2018-12-06 PROCEDURE — 36416 COLLJ CAPILLARY BLOOD SPEC: CPT

## 2018-12-06 PROCEDURE — 85610 PROTHROMBIN TIME: CPT

## 2018-12-06 PROCEDURE — G0463 HOSPITAL OUTPT CLINIC VISIT: HCPCS | Performed by: PHARMACIST

## 2018-12-06 RX ORDER — WARFARIN SODIUM 5 MG/1
TABLET ORAL
Qty: 126 TABLET | Refills: 0 | Status: SHIPPED | OUTPATIENT
Start: 2018-12-06 | End: 2019-02-04 | Stop reason: SDUPTHER

## 2018-12-06 RX ORDER — WARFARIN SODIUM 5 MG/1
TABLET ORAL
Qty: 60 TABLET | Refills: 0 | Status: SHIPPED | OUTPATIENT
Start: 2018-12-06 | End: 2018-12-06 | Stop reason: SDUPTHER

## 2018-12-06 NOTE — PROGRESS NOTES
Anticoagulation Clinic Progress Note    Anticoagulation Summary  As of 2018    INR goal:   2.5-3.5   TTR:   19.2 % (2.1 mo)   INR used for dosin.3! (2018)   Warfarin maintenance plan:   5 mg on Sun, Thu; 7.5 mg all other days   Weekly warfarin total:   47.5 mg   Plan last modified:   Nicolasa Kennedy RPH (2018)   Next INR check:   1/3/2019   Priority:   Maintenance   Target end date:   Indefinite    Indications    Status post mitral valve replacement [Z95.2]  Warfarin anticoagulation [Z79.01]             Anticoagulation Episode Summary     INR check location:       Preferred lab:       Send INR reminders to:    [facility]    Comments:         Anticoagulation Care Providers     Provider Role Specialty Phone number    Lindsey Vargas MD Referring Cardiology [phone]    Nicolasa Kennedy RPH Responsible Pharmacy [phone]          Drug interactions: has remained unchanged.  Diet: has remained unchanged.    Clinic Interview:  Patient Findings     Negatives:   Signs/symptoms of thrombosis, Signs/symptoms of bleeding,   Laboratory test error suspected, Change in health, Change in alcohol use,   Change in activity, Upcoming invasive procedure, Emergency department   visit, Upcoming dental procedure, Missed doses, Extra doses, Change in   medications, Change in diet/appetite, Hospital admission, Bruising, Other   complaints      Clinical Outcomes     Negatives:   Major bleeding event, Thromboembolic event,   Anticoagulation-related hospital admission, Anticoagulation-related ED   visit, Anticoagulation-related fatality        INR History:  Anticoagulation Monitoring 10/25/2018 2018 2018   INR 1.9 2.5 2.3   INR Date 10/25/2018 2018 2018   INR Goal 2.5-3.5 2.5-3.5 2.5-3.5   Trend Same Same Same   Last Week Total 40 mg 47.5 mg 47.5 mg   Next Week Total 50 mg 47.5 mg 47.5 mg   Sun 5 mg 5 mg 5 mg   Mon 7.5 mg 7.5 mg 7.5 mg   Tue 7.5 mg 7.5 mg 7.5 mg   Wed  7.5 mg 7.5 mg 7.5 mg   Thu 7.5 mg (10/25); Otherwise 5 mg 5 mg 5 mg   Fri 7.5 mg 7.5 mg 7.5 mg   Sat 7.5 mg 7.5 mg 7.5 mg   Visit Report - - -   Some recent data might be hidden       Plan:  1. INR is Subtherapeutic today- see above in Anticoagulation Summary.  Will instruct Yana Lehman to Continue their warfarin regimen- see above in Anticoagulation Summary.  2. Follow up in 1 month  3. Patient desires warfarin refills.  4. Verbal and written information provided. Patient expresses understanding and has no further questions at this time.    Nicolasa Kennedy, [facility]

## 2019-01-03 ENCOUNTER — ANTICOAGULATION VISIT (OUTPATIENT)
Dept: PHARMACY | Facility: HOSPITAL | Age: 64
End: 2019-01-03

## 2019-01-03 DIAGNOSIS — Z79.01 WARFARIN ANTICOAGULATION: ICD-10-CM

## 2019-01-03 DIAGNOSIS — Z95.2 STATUS POST MITRAL VALVE REPLACEMENT: ICD-10-CM

## 2019-01-03 LAB
INR PPP: 2.9 (ref 0.91–1.09)
PROTHROMBIN TIME: 34.7 SECONDS (ref 10–13.8)

## 2019-01-03 PROCEDURE — 36416 COLLJ CAPILLARY BLOOD SPEC: CPT

## 2019-01-03 PROCEDURE — 85610 PROTHROMBIN TIME: CPT

## 2019-01-03 NOTE — PROGRESS NOTES
Anticoagulation Clinic Progress Note    Anticoagulation Summary  As of 1/3/2019    INR goal:   2.5-3.5   TTR:   33.7 % (3 mo)   INR used for dosin.9 (1/3/2019)   Warfarin maintenance plan:   5 mg on Sun, Thu; 7.5 mg all other days   Weekly warfarin total:   47.5 mg   No change documented:   Emely Greene   Plan last modified:   Gildardo Baxter [facility] (2018)   Next INR check:   2019   Priority:   Maintenance   Target end date:   Indefinite    Indications    Status post mitral valve replacement [Z95.2]  Warfarin anticoagulation [Z79.01]             Anticoagulation Episode Summary     INR check location:       Preferred lab:       Send INR reminders to:    JO[facility] C[facility]    Comments:         Anticoagulation Care Providers     Provider Role Specialty Phone number    Lindsey Vargas MD Referring Cardiology [phone]    Nicolasa Kennedy [facility] Responsible Pharmacy [phone]          Clinic Interview:  Patient Findings     Negatives:   Signs/symptoms of thrombosis, Signs/symptoms of bleeding,   Laboratory test error suspected, Change in health, Change in alcohol use,   Change in activity, Upcoming invasive procedure, Emergency department   visit, Upcoming dental procedure, Missed doses, Extra doses, Change in   medications, Change in diet/appetite, Hospital admission, Bruising, Other   complaints      Clinical Outcomes     Negatives:   Major bleeding event, Thromboembolic event,   Anticoagulation-related hospital admission, Anticoagulation-related ED   visit, Anticoagulation-related fatality        INR History:  Anticoagulation Monitoring 2018 2018 1/3/2019   INR 2.5 2.3 2.9   INR Date 2018 2018 1/3/2019   INR Goal 2.5-3.5 2.5-3.5 2.5-3.5   Trend Same Same Same   Last Week Total 47.5 mg 47.5 mg 47.5 mg   Next Week Total 47.5 mg 47.5 mg 47.5 mg   Sun 5 mg 5 mg 5 mg   Mon 7.5 mg 7.5 mg 7.5 mg   Tue 7.5 mg 7.5 mg 7.5 mg   Wed 7.5 mg 7.5 mg 7.5 mg   Thu 5 mg  5 mg 5 mg   Fri 7.5 mg 7.5 mg 7.5 mg   Sat 7.5 mg 7.5 mg 7.5 mg   Visit Report - - -   Some recent data might be hidden       Plan:  1. INR is therapeutic today- see above in Anticoagulation Summary.   Will instruct Yana Lehman to continue their warfarin regimen- see above in Anticoagulation Summary.  2. Follow up in 4 weeks.  3. Patient declines warfarin refills.  4. Verbal and written information provided. Patient expresses understanding and has no further questions at this time.    Emely Greene

## 2019-02-04 ENCOUNTER — ANTICOAGULATION VISIT (OUTPATIENT)
Dept: PHARMACY | Facility: HOSPITAL | Age: 64
End: 2019-02-04

## 2019-02-04 DIAGNOSIS — Z95.2 STATUS POST MITRAL VALVE REPLACEMENT: ICD-10-CM

## 2019-02-04 DIAGNOSIS — Z79.01 WARFARIN ANTICOAGULATION: ICD-10-CM

## 2019-02-04 LAB
INR PPP: 2.4 (ref 0.91–1.09)
PROTHROMBIN TIME: 29.4 SECONDS (ref 10–13.8)

## 2019-02-04 PROCEDURE — 85610 PROTHROMBIN TIME: CPT

## 2019-02-04 PROCEDURE — 36416 COLLJ CAPILLARY BLOOD SPEC: CPT

## 2019-02-04 RX ORDER — WARFARIN SODIUM 5 MG/1
TABLET ORAL
Qty: 130 TABLET | Refills: 0 | Status: SHIPPED | OUTPATIENT
Start: 2019-02-04 | End: 2019-04-08 | Stop reason: SDUPTHER

## 2019-02-04 NOTE — PROGRESS NOTES
Anticoagulation Clinic Progress Note    Anticoagulation Summary  As of 2019    INR goal:   2.5-3.5   TTR:   45.7 % (4.1 mo)   INR used for dosin.4! (2019)   Warfarin maintenance plan:   5 mg on Sun, Thu; 7.5 mg all other days   Weekly warfarin total:   47.5 mg   Plan last modified:   Gildardo Baxter [facility] (2018)   Next INR check:   2019   Priority:   Maintenance   Target end date:   Indefinite    Indications    Status post mitral valve replacement [Z95.2]  Warfarin anticoagulation [Z79.01]             Anticoagulation Episode Summary     INR check location:       Preferred lab:       Send INR reminders to:    [facility] CRAWF[facility]    Comments:         Anticoagulation Care Providers     Provider Role Specialty Phone number    Lindsey Vargas MD Referring Cardiology [phone]    Nicolasa Kennedy [facility] Responsible Pharmacy [phone]          Clinic Interview:      INR History:  Anticoagulation Monitoring 2018 1/3/2019 2019   INR 2.3 2.9 2.4   INR Date 2018 1/3/2019 2019   INR Goal 2.5-3.5 2.5-3.5 2.5-3.5   Trend Same Same Same   Last Week Total 47.5 mg 47.5 mg 47.5 mg   Next Week Total 47.5 mg 47.5 mg 50 mg   Sun 5 mg 5 mg 5 mg   Mon 7.5 mg 7.5 mg 10 mg (); Otherwise 7.5 mg   Tue 7.5 mg 7.5 mg 7.5 mg   Wed 7.5 mg 7.5 mg 7.5 mg   Thu 5 mg 5 mg 5 mg   Fri 7.5 mg 7.5 mg 7.5 mg   Sat 7.5 mg 7.5 mg 7.5 mg   Visit Report - - -   Some recent data might be hidden       Plan:  1. INR is therapeutic today- see above in Anticoagulation Summary.   Will instruct Yana Lehman to continue their warfarin regimen- see above in Anticoagulation Summary.  2. Follow up in 3 weeks.  3. Patient desires warfarin refills.  4. Verbal and written information provided. Patient expresses understanding and has no further questions at this time.    Emely Greene

## 2019-02-25 ENCOUNTER — ANTICOAGULATION VISIT (OUTPATIENT)
Dept: PHARMACY | Facility: HOSPITAL | Age: 64
End: 2019-02-25

## 2019-02-25 DIAGNOSIS — Z95.2 STATUS POST MITRAL VALVE REPLACEMENT: ICD-10-CM

## 2019-02-25 DIAGNOSIS — Z79.01 WARFARIN ANTICOAGULATION: ICD-10-CM

## 2019-02-25 LAB
INR PPP: 2.6 (ref 0.91–1.09)
PROTHROMBIN TIME: 31.8 SECONDS (ref 10–13.8)

## 2019-02-25 PROCEDURE — 85610 PROTHROMBIN TIME: CPT

## 2019-02-25 PROCEDURE — 36416 COLLJ CAPILLARY BLOOD SPEC: CPT

## 2019-02-25 NOTE — PROGRESS NOTES
Anticoagulation Clinic Progress Note    Anticoagulation Summary  As of 2019    INR goal:   2.5-3.5   TTR:   46.4 % (4.8 mo)   INR used for dosin.6 (2019)   Warfarin maintenance plan:   5 mg on Sun, Thu; 7.5 mg all other days   Weekly warfarin total:   47.5 mg   No change documented:   Carmita Mares   Plan last modified:   Gildardo Baxter [facility] (2018)   Next INR check:   3/25/2019   Priority:   Maintenance   Target end date:   Indefinite    Indications    Status post mitral valve replacement [Z95.2]  Warfarin anticoagulation [Z79.01]             Anticoagulation Episode Summary     INR check location:       Preferred lab:       Send INR reminders to:    JOSE CRAWF[facility]    Comments:         Anticoagulation Care Providers     Provider Role Specialty Phone number    Lindsey Vargas MD Referring Cardiology [phone]    Nicolasa Kennedy [facility] Responsible Pharmacy [phone]          Clinic Interview:  Patient Findings     Negatives:   Signs/symptoms of thrombosis, Signs/symptoms of bleeding,   Laboratory test error suspected, Change in health, Change in alcohol use,   Change in activity, Upcoming invasive procedure, Emergency department   visit, Upcoming dental procedure, Missed doses, Extra doses, Change in   medications, Change in diet/appetite, Hospital admission, Bruising, Other   complaints      Clinical Outcomes     Negatives:   Major bleeding event, Thromboembolic event,   Anticoagulation-related hospital admission, Anticoagulation-related ED   visit, Anticoagulation-related fatality        INR History:  Anticoagulation Monitoring 1/3/2019 2019 2019   INR 2.9 2.4 2.6   INR Date 1/3/2019 2019 2019   INR Goal 2.5-3.5 2.5-3.5 2.5-3.5   Trend Same Same Same   Last Week Total 47.5 mg 47.5 mg 47.5 mg   Next Week Total 47.5 mg 50 mg 47.5 mg   Sun 5 mg 5 mg 5 mg   Mon 7.5 mg 10 mg (); Otherwise 7.5 mg 7.5 mg   Tue 7.5 mg 7.5 mg 7.5 mg   Wed 7.5 mg 7.5 mg  7.5 mg   Thu 5 mg 5 mg 5 mg   Fri 7.5 mg 7.5 mg 7.5 mg   Sat 7.5 mg 7.5 mg 7.5 mg   Visit Report - - -   Some recent data might be hidden       Plan:  1. INR is therapeutic today- see above in Anticoagulation Summary.   Will instruct Yana Lehman to continue their warfarin regimen- see above in Anticoagulation Summary.  2. Follow up in 4 weeks.  3. Patient declines warfarin refills.  4. Verbal and written information provided. Patient expresses understanding and has no further questions at this time.    Carmita Mares

## 2019-03-25 ENCOUNTER — ANTICOAGULATION VISIT (OUTPATIENT)
Dept: PHARMACY | Facility: HOSPITAL | Age: 64
End: 2019-03-25

## 2019-03-25 DIAGNOSIS — Z79.01 WARFARIN ANTICOAGULATION: ICD-10-CM

## 2019-03-25 DIAGNOSIS — Z95.2 STATUS POST MITRAL VALVE REPLACEMENT: ICD-10-CM

## 2019-03-25 LAB
INR PPP: 2.4 (ref 0.91–1.09)
PROTHROMBIN TIME: 28.9 SECONDS (ref 10–13.8)

## 2019-03-25 PROCEDURE — 36416 COLLJ CAPILLARY BLOOD SPEC: CPT

## 2019-03-25 PROCEDURE — 85610 PROTHROMBIN TIME: CPT

## 2019-03-25 NOTE — PROGRESS NOTES
Anticoagulation Clinic Progress Note    Anticoagulation Summary  As of 3/25/2019    INR goal:   2.5-3.5   TTR:   46.9 % (5.7 mo)   INR used for dosin.4! (3/25/2019)   Warfarin maintenance plan:   5 mg every Sun, Thu; 7.5 mg all other days   Weekly warfarin total:   47.5 mg   No change documented:   Emely Greene   Plan last modified:   Gildardo Baxter [facility] (2018)   Next INR check:   2019   Priority:   Maintenance   Target end date:   Indefinite    Indications    Status post mitral valve replacement [Z95.2]  Warfarin anticoagulation [Z79.01]             Anticoagulation Episode Summary     INR check location:       Preferred lab:       Send INR reminders to:   [facility]    Comments:         Anticoagulation Care Providers     Provider Role Specialty Phone number    Lindsey Vargas MD Referring Cardiology [phone]    Nicolasa Kennedy [facility] [phone]          Clinic Interview:  Patient Findings     Negatives:   Signs/symptoms of thrombosis, Signs/symptoms of bleeding,   Laboratory test error suspected, Change in health, Change in alcohol use,   Change in activity, Upcoming invasive procedure, Emergency department   visit, Upcoming dental procedure, Missed doses, Extra doses, Change in   medications, Change in diet/appetite, Hospital admission, Bruising, Other   complaints      Clinical Outcomes     Negatives:   Major bleeding event, Thromboembolic event,   Anticoagulation-related hospital admission, Anticoagulation-related ED   visit, Anticoagulation-related fatality        INR History:  Anticoagulation Monitoring 2019 2019 3/25/2019   INR 2.4 2.6 2.4   INR Date 2019 2019 3/25/2019   INR Goal 2.5-3.5 2.5-3.5 2.5-3.5   Trend Same Same Same   Last Week Total 47.5 mg 47.5 mg 47.5 mg   Next Week Total 50 mg 47.5 mg 47.5 mg   Sun 5 mg 5 mg 5 mg   Mon 10 mg (); Otherwise 7.5 mg 7.5 mg 7.5 mg   Tue 7.5 mg 7.5 mg 7.5 mg   Wed 7.5  mg 7.5 mg 7.5 mg   Thu 5 mg 5 mg 5 mg   Fri 7.5 mg 7.5 mg 7.5 mg   Sat 7.5 mg 7.5 mg 7.5 mg   Visit Report - - -   Some recent data might be hidden       Plan:  1. INR is therapeutic today- see above in Anticoagulation Summary.   Will instruct Yana Lheman to continue their warfarin regimen- see above in Anticoagulation Summary.  2. Follow up in 4 weeks.  3. Patient declines warfarin refills.  4. Verbal and written information provided. Patient expresses understanding and has no further questions at this time.    Emely Greene

## 2019-04-08 RX ORDER — METOPROLOL SUCCINATE 25 MG/1
TABLET, EXTENDED RELEASE ORAL
Qty: 60 TABLET | Refills: 2 | Status: SHIPPED | OUTPATIENT
Start: 2019-04-08 | End: 2019-07-06 | Stop reason: SDUPTHER

## 2019-04-08 RX ORDER — WARFARIN SODIUM 5 MG/1
TABLET ORAL
Qty: 130 TABLET | Refills: 0 | Status: SHIPPED | OUTPATIENT
Start: 2019-04-08 | End: 2019-08-06 | Stop reason: SDUPTHER

## 2019-04-17 ENCOUNTER — OFFICE VISIT (OUTPATIENT)
Dept: CARDIOLOGY | Facility: CLINIC | Age: 64
End: 2019-04-17

## 2019-04-17 VITALS
WEIGHT: 114.6 LBS | HEART RATE: 81 BPM | DIASTOLIC BLOOD PRESSURE: 70 MMHG | HEIGHT: 62 IN | BODY MASS INDEX: 21.09 KG/M2 | SYSTOLIC BLOOD PRESSURE: 130 MMHG

## 2019-04-17 DIAGNOSIS — I27.20 PULMONARY HYPERTENSION (HCC): ICD-10-CM

## 2019-04-17 DIAGNOSIS — I34.2 NON-RHEUMATIC MITRAL VALVE STENOSIS: ICD-10-CM

## 2019-04-17 DIAGNOSIS — I48.19 PERSISTENT ATRIAL FIBRILLATION (HCC): ICD-10-CM

## 2019-04-17 DIAGNOSIS — Z95.2 STATUS POST MITRAL VALVE REPLACEMENT: ICD-10-CM

## 2019-04-17 DIAGNOSIS — I42.9 CARDIOMYOPATHY, UNSPECIFIED TYPE (HCC): Primary | ICD-10-CM

## 2019-04-17 PROCEDURE — 93000 ELECTROCARDIOGRAM COMPLETE: CPT | Performed by: NURSE PRACTITIONER

## 2019-04-17 PROCEDURE — 99214 OFFICE O/P EST MOD 30 MIN: CPT | Performed by: NURSE PRACTITIONER

## 2019-04-17 NOTE — PROGRESS NOTES
Date of Office Visit: 2019  Encounter Provider: MONICA Wilson  Place of Service: [facility] CARDIOLOGY  Patient Name: Yana Lehman  :1955    Chief Complaint   Patient presents with   • Follow-up   :     HPI: Yana Lehman is a 63 y.o. female who presents today for cardiac follow up.      She has a past medical history of atrial fibrillation dating back to 2015 as remains on warfarin with INR checked at the [facility].  She has a history of severe mitral valve stenosis and moderate mitral insufficiencyunderwent mitral valve replacement with 31 mm Medtronic mechanical valve in . She developed atrial flutter postoperatively and underwent cardioversion. She had a cardiac catheterization in 2015 which showed normal coronary arteries and moderate to severe pulmonary hypertension.      In  she was hospitalized with palpitations and found to be back in atrial fibrillation with rapid ventricular response.  A 2D echocardiogram revealed an EF of 21-25% and normal functioning mechanical mitral valve.  She was started on Toprol XL and was adequately rate controlled. She had an echocardiogram in May 2018 which showed the following: On 18 which revealed an EF of 30%, strain -12%, possible amyloid, left ventricle mild to moderately dilated, left atrium volume severely increased, right atrium severely dilated, mild thickening of the aortic valve, Medtronic mechanical mitral valve prosthesis, trace tricuspid valve regurgitation, no evidence of pulmonary hypertension.    She was last seen in our office by Dr. Lindsey Vargas in 2018.  Dr. Vargas recommended a nuclear stress test to exclude the possibility of ischemic cardiomyopathy.  The nuclear stress test was completed 2018 which showed no evidence of ischemia or infarction and abnormal LV wall motion consistent with mild hypokinesis of the septal wall.  She was  recommended to start losartan 25 mg daily.    She presents today for follow-up visit.  She is tolerating the losartan without any adverse effects and is due for blood work to be completed at her PCP office in the near future.  She denies chest pain, shortness of air, PND, orthopnea, cough, edema, palpitations, dizziness, syncope, or bleeding.  Blood pressure and heart rates are both normal.    Past Medical History:   Diagnosis Date   • Acute bronchitis    • Acute kidney injury (CMS/HCC)     after surgery   • Acute systolic congestive heart failure (CMS/HCC) 3/5/2018   • Cachexia (CMS/HCC)    • Cardiomyopathy (CMS/HCC)     unspecified type   • Depression    • H/O shortness of breath    • Mitral valve stenosis     severe; s/p mitral valve replacement    • Persistent atrial fibrillation (CMS/HCC)     on Toprol and warfarin   • Pneumothorax    • Pulmonary hypertension (CMS/HCC)    • Rheumatic fever    • Sinus tachycardia    • Tricuspid valve insufficiency 3/15/2018   • Warfarin anticoagulation 03/15/2018    followed by [facility]       Past Surgical History:   Procedure Laterality Date   • CARDIAC CATHETERIZATION      procedure outcome: successful   • FOOT SURGERY     • MITRAL VALVE REPLACEMENT  06/2015    Subhash Márquez   • MITRAL VALVE REPLACEMENT     • TONSILLECTOMY         Social History     Social History   • Marital status: Single     Spouse name: N/A   • Number of children: N/A   • Years of education: N/A     Occupational History   • Not on file.     Social History Main Topics   • Smoking status: Former Smoker     Quit date: 2/18/2018   • Smokeless tobacco: Never Used      Comment: caffeine use   • Alcohol use No   • Drug use: No   • Sexual activity: Not on file       Family History   Problem Relation Age of Onset   • Heart failure Mother         congestive   • Cancer Father    • Atrial fibrillation Sister    • Hypertension Sister    • Atrial fibrillation Brother    • Heart disease Brother         cardiac  pacemaker   • Hypertension Brother        Review of Systems   Constitution: Negative for chills, diaphoresis, fever, malaise/fatigue, night sweats, weight gain and weight loss.   HENT: Negative for hearing loss, nosebleeds, sore throat and tinnitus.    Eyes: Negative for blurred vision, double vision, pain and visual disturbance.   Cardiovascular: Negative for chest pain, claudication, cyanosis, dyspnea on exertion, irregular heartbeat, leg swelling, near-syncope, orthopnea, palpitations, paroxysmal nocturnal dyspnea and syncope.   Respiratory: Negative for cough, hemoptysis, shortness of breath, snoring and wheezing.    Endocrine: Negative for cold intolerance, heat intolerance and polyuria.   Hematologic/Lymphatic: Negative for bleeding problem. Does not bruise/bleed easily.   Skin: Negative for color change, dry skin, flushing and itching.   Musculoskeletal: Negative for falls, joint pain, joint swelling, muscle cramps, muscle weakness and myalgias.   Gastrointestinal: Negative for abdominal pain, constipation, heartburn, melena, nausea and vomiting.   Genitourinary: Negative for dysuria and hematuria.   Neurological: Negative for excessive daytime sleepiness, dizziness, light-headedness, loss of balance, numbness, paresthesias, seizures and vertigo.   Psychiatric/Behavioral: Negative for altered mental status, depression, memory loss and substance abuse. The patient does not have insomnia and is not nervous/anxious.    Allergic/Immunologic: Negative for environmental allergies.       No Known Allergies      Current Outpatient Prescriptions:   •  digoxin (LANOXIN) 125 MCG tablet, Take 1 tablet by mouth Daily., Disp: 30 tablet, Rfl: 3  •  metoprolol succinate XL (TOPROL-XL) 25 MG 24 hr tablet, Take 1 tablet by mouth 2 (Two) Times a Day., Disp: 180 tablet, Rfl: 3  •  warfarin (COUMADIN) 5 MG tablet, Take 5 mg by mouth Daily., Disp: , Rfl:     Objective:     Vitals:    04/17/19 1314   BP: 130/70   BP Location: Left  "arm   Pulse: 81   Weight: 52 kg (114 lb 9.6 oz)   Height: 157.5 cm (62\")     Body mass index is 20.96 kg/m².    PHYSICAL EXAM:    Vitals Reviewed.   General Appearance: No acute distress, well developed and well nourished. Thin.   Eyes: Conjunctiva and lids: No erythema, swelling, or discharge. Sclera non-icteric.   HENT: Atraumatic, normocephalic. External eyes, ears, and nose normal. No hearing loss noted. Mucous membranes normal. Lips not cyanotic. Neck supple with no tenderness.  Respiratory: No signs of respiratory distress. Respiration rhythm and depth normal.   Clear to auscultation. No rales, crackles, rhonchi, or wheezing auscultated.   Cardiovascular:  Jugular Venous Pressure: Normal  Heart Rate and Rhythm: Irregularly, irregular.  Heart Sounds: Normal S1 and S2. No S3 or S4 noted.  Murmurs: No murmurs noted. No rubs, thrills, or gallops.   Arterial Pulses: Carotid pulses normal. No carotid bruit noted. Posterior tibialis and dorsalis pedis pulses normal.   Lower Extremities: No edema noted.  Gastrointestinal:  Abdomen soft, non-distended, non-tender. Normal bowel sounds. No hepatomegaly.   Musculoskeletal: Normal movement of extremities  Skin and Nails: General appearance normal. No pallor, cyanosis, diaphoresis. Skin temperature normal. No clubbing of fingernails.   Psychiatric: Patient alert and oriented to person, place, and time. Speech and behavior appropriate. Normal mood and affect.       ECG 12 Lead  Date/Time: 4/17/2019 1:18 PM  Performed by: Juanita Akins APRN  Authorized by: Juanita Akins APRN   Comparison: compared with previous ECG from 8/22/2018  Similar to previous ECG  Rhythm: atrial fibrillation  Rate: normal  BPM: 81  Conduction: conduction normal  ST Segments: ST segments normal  T Waves: T waves normal  QRS axis: normal    Clinical impression: abnormal EKG              Assessment:       Diagnosis Plan   1. Cardiomyopathy, unspecified type (CMS/HCC)  Adult Transthoracic Echo " Complete W/ Cont if Necessary Per Protocol   2. Persistent atrial fibrillation (CMS/[facility])     3. Non-rheumatic mitral valve stenosis     4. Status post mitral valve replacement     5. Pulmonary hypertension (CMS/[facility])            Plan:       1. Cardiomyopathy: She has been on Toprol-XL for at least a year and losartan 25 mg for about 6 months.  We will reassess her ejection fraction on echocardiogram.  She is well compensated and denies heart failure symptoms.  She is asymptomatic so I am not sure that she would qualify for an ICD.    Heart Failure  Assessment  • NYHA class I - There is no limitation of physical activity. Physical activity does not cause fatigue, palpitations or shortness of breath.  • Beta blocker prescribed  • Angiotensin receptor blocker (ARB) prescribed  • Left ventricular function is severely reduced by qualitative assessment    Plan  • The patient has received heart failure education on the following topics: dietary sodium restriction, medication instructions, symptom management, physical activity and weight monitoring  • The heart failure care plan was discussed with the patient today including: continuing the current program        2.  Persistent Atrial Fibrillation: She remains in atrial fibrillation and is asymptomatic.  She continue with digoxin, Toprol-XL, and warfarin.  Her INR levels are followed here at the [facility] and she denies any bleeding.  She is due for a CBC, BMP/CMP, and digoxin level and plans to have that completed at her PCP office in the near future.    Atrial Fibrillation and Atrial Flutter  Assessment  • The patient has persistent atrial fibrillation  • The patient's CHADS2-VASc score is 3  • A [id number] score of 2 or more is considered a high risk for a thromboembolic event  • Warfarin prescribed    Plan  • Continue in atrial fibrillation with rate control  • Continue warfarin for antithrombotic therapy, bleeding issues discussed  • Continue beta  blocker and digoxin for rate control      3/4.  Mitral Valve Stenosis/Status Post Mitral Valve Replacement: Mitral valve was stable on last echocardiogram.    5.  Pulmonary Hypertension: Resolved per echocardiogram 2018.    6.  I will reviewed the echocardiogram results and she will follow-up with Dr. Lindsey Vargas in 6 months, unless otherwise needed sooner.      As always, it has been a pleasure to participate in your patient's care.      Sincerely,         MONICA Sanchez

## 2019-04-25 ENCOUNTER — ANTICOAGULATION VISIT (OUTPATIENT)
Dept: PHARMACY | Facility: HOSPITAL | Age: 64
End: 2019-04-25

## 2019-04-25 DIAGNOSIS — Z79.01 WARFARIN ANTICOAGULATION: ICD-10-CM

## 2019-04-25 DIAGNOSIS — Z95.2 STATUS POST MITRAL VALVE REPLACEMENT: ICD-10-CM

## 2019-04-25 LAB
INR PPP: 3.1 (ref 0.91–1.09)
PROTHROMBIN TIME: 37.4 SECONDS (ref 10–13.8)

## 2019-04-25 PROCEDURE — 36416 COLLJ CAPILLARY BLOOD SPEC: CPT

## 2019-04-25 PROCEDURE — 85610 PROTHROMBIN TIME: CPT

## 2019-04-26 NOTE — PROGRESS NOTES
Anticoagulation Clinic Progress Note    Anticoagulation Summary  As of 4/25/2019    INR goal:   2.5-3.5   TTR:   52.8 % (6.8 mo)   INR used for dosing:   3.1 (4/25/2019)   Warfarin maintenance plan:   5 mg every Sun, Thu; 7.5 mg all other days   Weekly warfarin total:   47.5 mg   No change documented:   Rita Bobby   Plan last modified:   Gildardo Baxter [facility] (12/6/2018)   Next INR check:   5/23/2019   Priority:   Maintenance   Target end date:   Indefinite    Indications    Status post mitral valve replacement [Z95.2]  Warfarin anticoagulation [Z79.01]             Anticoagulation Episode Summary     INR check location:       Preferred lab:       Send INR reminders to:    [facility]    Comments:         Anticoagulation Care Providers     Provider Role Specialty Phone number    Lindsey Vargas MD Referring Cardiology [phone]    Nicolasa Kennedy [facility] [phone]          Clinic Interview:  Patient Findings     Negatives:   Signs/symptoms of thrombosis, Signs/symptoms of bleeding,   Laboratory test error suspected, Change in health, Change in alcohol use,   Change in activity, Upcoming invasive procedure, Emergency department   visit, Upcoming dental procedure, Missed doses, Extra doses, Change in   medications, Change in diet/appetite, Hospital admission, Bruising, Other   complaints      Clinical Outcomes     Negatives:   Major bleeding event, Thromboembolic event,   Anticoagulation-related hospital admission, Anticoagulation-related ED   visit, Anticoagulation-related fatality        INR History:  Anticoagulation Monitoring 2/25/2019 3/25/2019 4/25/2019   INR 2.6 2.4 3.1   INR Date 2/25/2019 3/25/2019 4/25/2019   INR Goal 2.5-3.5 2.5-3.5 2.5-3.5   Trend Same Same Same   Last Week Total 47.5 mg 47.5 mg 47.5 mg   Next Week Total 47.5 mg 47.5 mg 47.5 mg   Sun 5 mg 5 mg 5 mg   Mon 7.5 mg 7.5 mg 7.5 mg   Tue 7.5 mg 7.5 mg 7.5 mg   Wed 7.5 mg 7.5 mg 7.5 mg   Thu 5 mg  5 mg 5 mg   Fri 7.5 mg 7.5 mg 7.5 mg   Sat 7.5 mg 7.5 mg 7.5 mg   Visit Report - - -   Some recent data might be hidden       Plan:  1. INR is therapeutic today- see above in Anticoagulation Summary.   Will instruct Yana Lehman to continue their warfarin regimen- see above in Anticoagulation Summary.  2. Follow up in 4 weeks.  3. Patient declines warfarin refills.  4. Verbal and written information provided. Patient expresses understanding and has no further questions at this time.    Rita Bobby

## 2019-05-20 ENCOUNTER — HOSPITAL ENCOUNTER (OUTPATIENT)
Dept: CARDIOLOGY | Facility: HOSPITAL | Age: 64
Discharge: HOME | End: 2019-05-20
Admitting: NURSE PRACTITIONER

## 2019-05-20 ENCOUNTER — ANTICOAGULATION VISIT (OUTPATIENT)
Dept: PHARMACY | Facility: HOSPITAL | Age: 64
End: 2019-05-20

## 2019-05-20 VITALS
HEIGHT: 62 IN | DIASTOLIC BLOOD PRESSURE: 80 MMHG | WEIGHT: 114 LBS | HEART RATE: 69 BPM | SYSTOLIC BLOOD PRESSURE: 138 MMHG | BODY MASS INDEX: 20.98 KG/M2

## 2019-05-20 DIAGNOSIS — Z79.01 WARFARIN ANTICOAGULATION: ICD-10-CM

## 2019-05-20 DIAGNOSIS — Z95.2 STATUS POST MITRAL VALVE REPLACEMENT: ICD-10-CM

## 2019-05-20 DIAGNOSIS — I42.9 CARDIOMYOPATHY, UNSPECIFIED TYPE (HCC): ICD-10-CM

## 2019-05-20 LAB
AORTIC ROOT ANNULUS: 2.1 CM
BH CV ECHO MEAS - ACS: 1.9 CM
BH CV ECHO MEAS - AO MAX PG (FULL): 6.1 MMHG
BH CV ECHO MEAS - AO MAX PG: 8 MMHG
BH CV ECHO MEAS - AO MEAN PG (FULL): 2.6 MMHG
BH CV ECHO MEAS - AO MEAN PG: 3.4 MMHG
BH CV ECHO MEAS - AO ROOT AREA (BSA CORRECTED): 2.1
BH CV ECHO MEAS - AO ROOT AREA: 7.5 CM^2
BH CV ECHO MEAS - AO V2 MAX: 141.2 CM/SEC
BH CV ECHO MEAS - AO V2 MEAN: 83.3 CM/SEC
BH CV ECHO MEAS - AO V2 VTI: 31.4 CM
BH CV ECHO MEAS - AVA(I,A): 1.2 CM^2
BH CV ECHO MEAS - AVA(I,D): 1.2 CM^2
BH CV ECHO MEAS - AVA(V,A): 1.3 CM^2
BH CV ECHO MEAS - AVA(V,D): 1.3 CM^2
BH CV ECHO MEAS - BSA(HAYCOCK): 1.5 M^2
BH CV ECHO MEAS - BSA: 1.5 M^2
BH CV ECHO MEAS - BZI_BMI: 20.9 KILOGRAMS/M^2
BH CV ECHO MEAS - BZI_METRIC_HEIGHT: 157.5 CM
BH CV ECHO MEAS - BZI_METRIC_WEIGHT: 51.7 KG
BH CV ECHO MEAS - EDV(MOD-SP2): 67 ML
BH CV ECHO MEAS - EDV(MOD-SP4): 68 ML
BH CV ECHO MEAS - EDV(TEICH): 64.5 ML
BH CV ECHO MEAS - EF(CUBED): 35.7 %
BH CV ECHO MEAS - EF(MOD-BP): 42 %
BH CV ECHO MEAS - EF(MOD-SP2): 40.3 %
BH CV ECHO MEAS - EF(MOD-SP4): 42.6 %
BH CV ECHO MEAS - EF(TEICH): 29.8 %
BH CV ECHO MEAS - ESV(MOD-SP2): 40 ML
BH CV ECHO MEAS - ESV(MOD-SP4): 39 ML
BH CV ECHO MEAS - ESV(TEICH): 45.3 ML
BH CV ECHO MEAS - FS: 13.7 %
BH CV ECHO MEAS - IVS/LVPW: 0.75
BH CV ECHO MEAS - IVSD: 0.73 CM
BH CV ECHO MEAS - LAT PEAK E' VEL: 12 CM/SEC
BH CV ECHO MEAS - LV DIASTOLIC VOL/BSA (35-75): 45.2 ML/M^2
BH CV ECHO MEAS - LV MASS(C)D: 96.6 GRAMS
BH CV ECHO MEAS - LV MASS(C)DI: 64.2 GRAMS/M^2
BH CV ECHO MEAS - LV MAX PG: 1.8 MMHG
BH CV ECHO MEAS - LV MEAN PG: 0.84 MMHG
BH CV ECHO MEAS - LV SYSTOLIC VOL/BSA (12-30): 25.9 ML/M^2
BH CV ECHO MEAS - LV V1 MAX: 67.7 CM/SEC
BH CV ECHO MEAS - LV V1 MEAN: 42.5 CM/SEC
BH CV ECHO MEAS - LV V1 VTI: 13.3 CM
BH CV ECHO MEAS - LVIDD: 3.9 CM
BH CV ECHO MEAS - LVIDS: 3.3 CM
BH CV ECHO MEAS - LVLD AP2: 6.8 CM
BH CV ECHO MEAS - LVLD AP4: 6.9 CM
BH CV ECHO MEAS - LVLS AP2: 6.1 CM
BH CV ECHO MEAS - LVLS AP4: 6.5 CM
BH CV ECHO MEAS - LVOT AREA (M): 2.8 CM^2
BH CV ECHO MEAS - LVOT AREA: 2.7 CM^2
BH CV ECHO MEAS - LVOT DIAM: 1.9 CM
BH CV ECHO MEAS - LVPWD: 0.98 CM
BH CV ECHO MEAS - MED PEAK E' VEL: 9 CM/SEC
BH CV ECHO MEAS - MV DEC SLOPE: 563.1 CM/SEC^2
BH CV ECHO MEAS - MV DEC TIME: 0.13 SEC
BH CV ECHO MEAS - MV E MAX VEL: 146 CM/SEC
BH CV ECHO MEAS - MV MAX PG: 12.5 MMHG
BH CV ECHO MEAS - MV MEAN PG: 4 MMHG
BH CV ECHO MEAS - MV P1/2T MAX VEL: 175.1 CM/SEC
BH CV ECHO MEAS - MV P1/2T: 91.1 MSEC
BH CV ECHO MEAS - MV V2 MAX: 176.5 CM/SEC
BH CV ECHO MEAS - MV V2 MEAN: 88 CM/SEC
BH CV ECHO MEAS - MV V2 VTI: 31 CM
BH CV ECHO MEAS - MVA P1/2T LCG: 1.3 CM^2
BH CV ECHO MEAS - MVA(P1/2T): 2.4 CM^2
BH CV ECHO MEAS - MVA(VTI): 1.2 CM^2
BH CV ECHO MEAS - PA ACC TIME: 0.09 SEC
BH CV ECHO MEAS - PA MAX PG (FULL): 1.5 MMHG
BH CV ECHO MEAS - PA MAX PG: 2.7 MMHG
BH CV ECHO MEAS - PA PR(ACCEL): 37.8 MMHG
BH CV ECHO MEAS - PA V2 MAX: 81.4 CM/SEC
BH CV ECHO MEAS - PVA(V,A): 1.9 CM^2
BH CV ECHO MEAS - PVA(V,D): 1.9 CM^2
BH CV ECHO MEAS - QP/QS: 0.89
BH CV ECHO MEAS - RAP SYSTOLE: 8 MMHG
BH CV ECHO MEAS - RV MAX PG: 1.1 MMHG
BH CV ECHO MEAS - RV MEAN PG: 0.56 MMHG
BH CV ECHO MEAS - RV V1 MAX: 52.9 CM/SEC
BH CV ECHO MEAS - RV V1 MEAN: 35 CM/SEC
BH CV ECHO MEAS - RV V1 VTI: 11.2 CM
BH CV ECHO MEAS - RVOT AREA: 2.9 CM^2
BH CV ECHO MEAS - RVOT DIAM: 1.9 CM
BH CV ECHO MEAS - RVSP: 38.1 MMHG
BH CV ECHO MEAS - SI(AO): 157.4 ML/M^2
BH CV ECHO MEAS - SI(CUBED): 13.7 ML/M^2
BH CV ECHO MEAS - SI(LVOT): 24.3 ML/M^2
BH CV ECHO MEAS - SI(MOD-SP2): 17.9 ML/M^2
BH CV ECHO MEAS - SI(MOD-SP4): 19.3 ML/M^2
BH CV ECHO MEAS - SI(TEICH): 12.8 ML/M^2
BH CV ECHO MEAS - SUP REN AO DIAM: 1.9 CM
BH CV ECHO MEAS - SV(AO): 236.9 ML
BH CV ECHO MEAS - SV(CUBED): 20.6 ML
BH CV ECHO MEAS - SV(LVOT): 36.5 ML
BH CV ECHO MEAS - SV(MOD-SP2): 27 ML
BH CV ECHO MEAS - SV(MOD-SP4): 29 ML
BH CV ECHO MEAS - SV(RVOT): 32.6 ML
BH CV ECHO MEAS - SV(TEICH): 19.2 ML
BH CV ECHO MEAS - TAPSE (>1.6): 1.9 CM2
BH CV ECHO MEAS - TR MAX VEL: 274.2 CM/SEC
BH CV ECHO MEASUREMENTS AVERAGE E/E' RATIO: 13.9
BH CV XLRA - RV BASE: 2.7 CM
BH CV XLRA - TDI S': 10 CM/SEC
INR PPP: 3.1 (ref 0.91–1.09)
LEFT ATRIUM VOLUME INDEX: 43 ML/M2
LEFT ATRIUM VOLUME: 64 CM3
MAXIMAL PREDICTED HEART RATE: 155 BPM
PROTHROMBIN TIME: 36.9 SECONDS (ref 10–13.8)
SINUS: 3.1 CM
STJ: 2.7 CM
STRESS TARGET HR: 132 BPM
TV MEAN GRADIENT: 3 MMHG
TV VTI: 26 CM

## 2019-05-20 PROCEDURE — 36416 COLLJ CAPILLARY BLOOD SPEC: CPT

## 2019-05-20 PROCEDURE — 0399T HC MYOCARDL STRAIN IMAG QUAN ASSMT PER SESS: CPT

## 2019-05-20 PROCEDURE — 0399T ADULT TRANSTHORACIC ECHO COMPLETE W/ CONT IF NECESSARY PER PROTOCOL: CPT | Performed by: INTERNAL MEDICINE

## 2019-05-20 PROCEDURE — 93306 TTE W/DOPPLER COMPLETE: CPT | Performed by: INTERNAL MEDICINE

## 2019-05-20 PROCEDURE — 85610 PROTHROMBIN TIME: CPT

## 2019-05-20 PROCEDURE — 25010000002 PERFLUTREN (DEFINITY) 8.476 MG IN SODIUM CHLORIDE 0.9 % 10 ML INJECTION: Performed by: NURSE PRACTITIONER

## 2019-05-20 PROCEDURE — 93306 TTE W/DOPPLER COMPLETE: CPT

## 2019-05-20 RX ADMIN — PERFLUTREN 2 ML: 6.52 INJECTION, SUSPENSION INTRAVENOUS at 09:05

## 2019-05-20 NOTE — PROGRESS NOTES
Anticoagulation Clinic Progress Note    Anticoagulation Summary  As of 5/20/2019    INR goal:   2.5-3.5   TTR:   58.0 % (7.6 mo)   INR used for dosing:   3.1 (5/20/2019)   Warfarin maintenance plan:   5 mg every Sun, Thu; 7.5 mg all other days   Weekly warfarin total:   47.5 mg   No change documented:   Emely Greene   Plan last modified:   Gildardo Baxter [facility] (12/6/2018)   Next INR check:   6/17/2019   Priority:   Maintenance   Target end date:   Indefinite    Indications    Status post mitral valve replacement [Z95.2]  Warfarin anticoagulation [Z79.01]             Anticoagulation Episode Summary     INR check location:       Preferred lab:       Send INR reminders to:    [facility]    Comments:         Anticoagulation Care Providers     Provider Role Specialty Phone number    Lindsey Vargas MD Referring Cardiology [phone]    Nicolasa Kennedy [facility] [phone]          Clinic Interview:  Patient Findings     Negatives:   Signs/symptoms of thrombosis, Signs/symptoms of bleeding,   Laboratory test error suspected, Change in health, Change in alcohol use,   Change in activity, Upcoming invasive procedure, Emergency department   visit, Upcoming dental procedure, Missed doses, Extra doses, Change in   medications, Change in diet/appetite, Hospital admission, Bruising, Other   complaints      Clinical Outcomes     Negatives:   Major bleeding event, Thromboembolic event,   Anticoagulation-related hospital admission, Anticoagulation-related ED   visit, Anticoagulation-related fatality        INR History:  Anticoagulation Monitoring 3/25/2019 4/25/2019 5/20/2019   INR 2.4 3.1 3.1   INR Date 3/25/2019 4/25/2019 5/20/2019   INR Goal 2.5-3.5 2.5-3.5 2.5-3.5   Trend Same Same Same   Last Week Total 47.5 mg 47.5 mg 47.5 mg   Next Week Total 47.5 mg 47.5 mg 47.5 mg   Sun 5 mg 5 mg 5 mg   Mon 7.5 mg 7.5 mg 7.5 mg   Tue 7.5 mg 7.5 mg 7.5 mg   Wed 7.5 mg 7.5 mg 7.5 mg    Thu 5 mg 5 mg 5 mg   Fri 7.5 mg 7.5 mg 7.5 mg   Sat 7.5 mg 7.5 mg 7.5 mg   Visit Report - - -   Some recent data might be hidden       Plan:  1. INR is therapeutic today- see above in Anticoagulation Summary.   Will instruct Yana Lehman to continue their warfarin regimen- see above in Anticoagulation Summary.  2. Follow up in 4 weeks.  3. Patient declines warfarin refills.  4. Verbal and written information provided. Patient expresses understanding and has no further questions at this time.    Emely Greene

## 2019-05-21 ENCOUNTER — TELEPHONE (OUTPATIENT)
Dept: CARDIOLOGY | Facility: CLINIC | Age: 64
End: 2019-05-21

## 2019-05-21 NOTE — TELEPHONE ENCOUNTER
Echo Results:    · Calculated right ventricular systolic pressure from tricuspid regurgitation is 38.1 mmHg.  · Left atrial cavity size is moderately dilated.  · The following left ventricular wall segments are hypokinetic: mid anteroseptal.  · Left ventricular systolic function is low normal.  · Estimated EF appears to be in the range of 46 - 50%.  · Trace mitral valve regurgitation is present. The mitral valve mean gradient is 4.0 mmHg. The mitral valve peak gradient is 12.5 mmHg. There is a 31mm medtronic bileaflet mechanical mitral valve prosthesis present. The prosthetic valve is normal.      Dr. Vargas-please review the echocardiogram.  She has the wall motion abnormality.  She had a nuclear stress test completed September 2018 which showed mild hypokinesis of the septal wall.  Please advise.    There was also question in the past if she had amyloid heart disease per last echocardiogram.  Her ejection fraction has improved back to normal.     Please advise. Thanks.

## 2019-05-21 NOTE — TELEPHONE ENCOUNTER
----- Message from MONICA Novak sent at 4/17/2019  2:02 PM EDT -----    Follow up on echo   Amyloid noted?  Follow up on blood work from PCP office

## 2019-05-22 NOTE — TELEPHONE ENCOUNTER
Patient was informed about echocardiogram results and verbalizes understanding.  She will follow-up with Dr. Vargas in October 2019.

## 2019-05-23 ENCOUNTER — APPOINTMENT (OUTPATIENT)
Dept: PHARMACY | Facility: HOSPITAL | Age: 64
End: 2019-05-23

## 2019-06-17 ENCOUNTER — APPOINTMENT (OUTPATIENT)
Dept: PHARMACY | Facility: HOSPITAL | Age: 64
End: 2019-06-17

## 2019-06-18 ENCOUNTER — ANTICOAGULATION VISIT (OUTPATIENT)
Dept: PHARMACY | Facility: HOSPITAL | Age: 64
End: 2019-06-18

## 2019-06-18 DIAGNOSIS — Z95.2 STATUS POST MITRAL VALVE REPLACEMENT: ICD-10-CM

## 2019-06-18 DIAGNOSIS — Z79.01 WARFARIN ANTICOAGULATION: ICD-10-CM

## 2019-06-18 LAB
INR PPP: 2.8 (ref 0.91–1.09)
PROTHROMBIN TIME: 34 SECONDS (ref 10–13.8)

## 2019-06-18 PROCEDURE — 36416 COLLJ CAPILLARY BLOOD SPEC: CPT

## 2019-06-18 PROCEDURE — 85610 PROTHROMBIN TIME: CPT

## 2019-06-18 NOTE — PROGRESS NOTES
Anticoagulation Clinic Progress Note    Anticoagulation Summary  As of 2019    INR goal:   2.5-3.5   TTR:   62.7 % (8.6 mo)   INR used for dosin.8 (2019)   Warfarin maintenance plan:   5 mg every Sun, Thu; 7.5 mg all other days   Weekly warfarin total:   47.5 mg   No change documented:   Rita Bobby   Plan last modified:   Gildardo Baxter [facility] (2018)   Next INR check:   2019   Priority:   Maintenance   Target end date:   Indefinite    Indications    Status post mitral valve replacement [Z95.2]  Warfarin anticoagulation [Z79.01]             Anticoagulation Episode Summary     INR check location:       Preferred lab:       Send INR reminders to:    JOSE CRAWFORD [facility]    Comments:         Anticoagulation Care Providers     Provider Role Specialty Phone number    Lindsey Vargas MD Referring Cardiology [phone]    Nicolasa Kennedy [facility] [phone]          Clinic Interview:  Patient Findings     Negatives:   Signs/symptoms of thrombosis, Signs/symptoms of bleeding,   Laboratory test error suspected, Change in health, Change in alcohol use,   Change in activity, Upcoming invasive procedure, Emergency department   visit, Upcoming dental procedure, Missed doses, Extra doses, Change in   medications, Change in diet/appetite, Hospital admission, Bruising, Other   complaints      Clinical Outcomes     Negatives:   Major bleeding event, Thromboembolic event,   Anticoagulation-related hospital admission, Anticoagulation-related ED   visit, Anticoagulation-related fatality        INR History:  Anticoagulation Monitoring 2019   INR 3.1 3.1 2.8   INR Date 2019   INR Goal 2.5-3.5 2.5-3.5 2.5-3.5   Trend Same Same Same   Last Week Total 47.5 mg 47.5 mg 47.5 mg   Next Week Total 47.5 mg 47.5 mg 47.5 mg   Sun 5 mg 5 mg 5 mg   Mon 7.5 mg 7.5 mg 7.5 mg   Tue 7.5 mg 7.5 mg 7.5 mg   Wed 7.5 mg 7.5 mg 7.5 mg   Thu 5 mg  5 mg 5 mg   Fri 7.5 mg 7.5 mg 7.5 mg   Sat 7.5 mg 7.5 mg 7.5 mg   Visit Report - - -   Some recent data might be hidden       Plan:  1. INR is therapeutic today- see above in Anticoagulation Summary.   Will instruct Yana Lehman to continue their warfarin regimen- see above in Anticoagulation Summary.  2. Follow up in 4 weeks.  3. Patient declines warfarin refills.  4. Verbal and written information provided. Patient expresses understanding and has no further questions at this time.    Rita Bobby

## 2019-07-08 RX ORDER — METOPROLOL SUCCINATE 25 MG/1
TABLET, EXTENDED RELEASE ORAL
Qty: 60 TABLET | Refills: 6 | Status: SHIPPED | OUTPATIENT
Start: 2019-07-08 | End: 2019-11-12 | Stop reason: SDUPTHER

## 2019-07-16 ENCOUNTER — ANTICOAGULATION VISIT (OUTPATIENT)
Dept: PHARMACY | Facility: HOSPITAL | Age: 64
End: 2019-07-16

## 2019-07-16 DIAGNOSIS — Z95.2 STATUS POST MITRAL VALVE REPLACEMENT: ICD-10-CM

## 2019-07-16 DIAGNOSIS — Z79.01 WARFARIN ANTICOAGULATION: ICD-10-CM

## 2019-07-16 LAB
INR PPP: 3.2 (ref 0.91–1.09)
PROTHROMBIN TIME: 38.3 SECONDS (ref 10–13.8)

## 2019-07-16 PROCEDURE — 36416 COLLJ CAPILLARY BLOOD SPEC: CPT

## 2019-07-16 PROCEDURE — 85610 PROTHROMBIN TIME: CPT

## 2019-07-16 NOTE — PROGRESS NOTES
Anticoagulation Clinic Progress Note    Anticoagulation Summary  As of 7/16/2019    INR goal:   2.5-3.5   TTR:   66.4 % (9.5 mo)   INR used for dosing:   3.2 (7/16/2019)   Warfarin maintenance plan:   5 mg every Sun, Thu; 7.5 mg all other days   Weekly warfarin total:   47.5 mg   No change documented:   Rita Bobby   Plan last modified:   Gildardo Baxter [facility] (12/6/2018)   Next INR check:   8/15/2019   Priority:   Maintenance   Target end date:   Indefinite    Indications    Status post mitral valve replacement [Z95.2]  Warfarin anticoagulation [Z79.01]             Anticoagulation Episode Summary     INR check location:       Preferred lab:       Send INR reminders to:    JOSE CRAWFORD [facility]    Comments:         Anticoagulation Care Providers     Provider Role Specialty Phone number    Lindsey Vargas MD Referring Cardiology [phone]    Nicolasa Kennedy [facility] [phone]          Clinic Interview:  Patient Findings     Negatives:   Signs/symptoms of thrombosis, Signs/symptoms of bleeding,   Laboratory test error suspected, Change in health, Change in alcohol use,   Change in activity, Upcoming invasive procedure, Emergency department   visit, Upcoming dental procedure, Missed doses, Extra doses, Change in   medications, Change in diet/appetite, Hospital admission, Bruising, Other   complaints      Clinical Outcomes     Negatives:   Major bleeding event, Thromboembolic event,   Anticoagulation-related hospital admission, Anticoagulation-related ED   visit, Anticoagulation-related fatality        INR History:  Anticoagulation Monitoring 5/20/2019 6/18/2019 7/16/2019   INR 3.1 2.8 3.2   INR Date 5/20/2019 6/18/2019 7/16/2019   INR Goal 2.5-3.5 2.5-3.5 2.5-3.5   Trend Same Same Same   Last Week Total 47.5 mg 47.5 mg 47.5 mg   Next Week Total 47.5 mg 47.5 mg 47.5 mg   Sun 5 mg 5 mg 5 mg   Mon 7.5 mg 7.5 mg 7.5 mg   Tue 7.5 mg 7.5 mg 7.5 mg   Wed 7.5 mg 7.5 mg 7.5 mg   Thu 5 mg  5 mg 5 mg   Fri 7.5 mg 7.5 mg 7.5 mg   Sat 7.5 mg 7.5 mg 7.5 mg   Visit Report - - -   Some recent data might be hidden       Plan:  1. INR is therapeutic today- see above in Anticoagulation Summary.   Will instruct Yana Lehman to continue their warfarin regimen- see above in Anticoagulation Summary.  2. Follow up in 4 weeks.  3. Patient declines warfarin refills.  4. Verbal and written information provided. Patient expresses understanding and has no further questions at this time.    Rita Bobby

## 2019-08-01 RX ORDER — DIGOXIN 125 UG/1
TABLET ORAL
Qty: 30 TABLET | Refills: 5 | Status: SHIPPED | OUTPATIENT
Start: 2019-08-01 | End: 2019-11-12 | Stop reason: SDUPTHER

## 2019-08-02 RX ORDER — LOSARTAN POTASSIUM 25 MG/1
25 TABLET ORAL NIGHTLY
Qty: 90 TABLET | Refills: 1 | Status: SHIPPED | OUTPATIENT
Start: 2019-08-02 | End: 2019-11-12

## 2019-08-06 RX ORDER — WARFARIN SODIUM 5 MG/1
TABLET ORAL
Qty: 130 TABLET | Refills: 0 | Status: SHIPPED | OUTPATIENT
Start: 2019-08-06 | End: 2020-01-07 | Stop reason: SDUPTHER

## 2019-08-13 ENCOUNTER — ANTICOAGULATION VISIT (OUTPATIENT)
Dept: PHARMACY | Facility: HOSPITAL | Age: 64
End: 2019-08-13

## 2019-08-13 DIAGNOSIS — Z79.01 WARFARIN ANTICOAGULATION: ICD-10-CM

## 2019-08-13 DIAGNOSIS — Z95.2 STATUS POST MITRAL VALVE REPLACEMENT: ICD-10-CM

## 2019-08-13 LAB
INR PPP: 3.3 (ref 0.91–1.09)
PROTHROMBIN TIME: 39.5 SECONDS (ref 10–13.8)

## 2019-08-13 PROCEDURE — 36416 COLLJ CAPILLARY BLOOD SPEC: CPT

## 2019-08-13 PROCEDURE — 85610 PROTHROMBIN TIME: CPT

## 2019-08-13 NOTE — PROGRESS NOTES
Anticoagulation Clinic Progress Note    Anticoagulation Summary  As of 8/13/2019    INR goal:   2.5-3.5   TTR:   69.4 % (10.4 mo)   INR used for dosing:   3.3 (8/13/2019)   Warfarin maintenance plan:   5 mg every Sun, Thu; 7.5 mg all other days   Weekly warfarin total:   47.5 mg   No change documented:   Rita Bobby   Plan last modified:   Gildardo Baxter [facility] (12/6/2018)   Next INR check:   9/10/2019   Priority:   Maintenance   Target end date:   Indefinite    Indications    Status post mitral valve replacement [Z95.2]  Warfarin anticoagulation [Z79.01]             Anticoagulation Episode Summary     INR check location:       Preferred lab:       Send INR reminders to:    JOSE CRAWFORD [facility]    Comments:         Anticoagulation Care Providers     Provider Role Specialty Phone number    Lindsey Vargas MD Referring Cardiology [phone]    Nicolasa Kennedy [facility] [phone]          Clinic Interview:  Patient Findings     Negatives:   Signs/symptoms of thrombosis, Signs/symptoms of bleeding,   Laboratory test error suspected, Change in health, Change in alcohol use,   Change in activity, Upcoming invasive procedure, Emergency department   visit, Upcoming dental procedure, Missed doses, Extra doses, Change in   medications, Change in diet/appetite, Hospital admission, Bruising, Other   complaints      Clinical Outcomes     Negatives:   Major bleeding event, Thromboembolic event,   Anticoagulation-related hospital admission, Anticoagulation-related ED   visit, Anticoagulation-related fatality        INR History:  Anticoagulation Monitoring 6/18/2019 7/16/2019 8/13/2019   INR 2.8 3.2 3.3   INR Date 6/18/2019 7/16/2019 8/13/2019   INR Goal 2.5-3.5 2.5-3.5 2.5-3.5   Trend Same Same Same   Last Week Total 47.5 mg 47.5 mg 47.5 mg   Next Week Total 47.5 mg 47.5 mg 47.5 mg   Sun 5 mg 5 mg 5 mg   Mon 7.5 mg 7.5 mg 7.5 mg   Tue 7.5 mg 7.5 mg 7.5 mg   Wed 7.5 mg 7.5 mg 7.5 mg   Thu 5  mg 5 mg 5 mg   Fri 7.5 mg 7.5 mg 7.5 mg   Sat 7.5 mg 7.5 mg 7.5 mg   Visit Report - - -   Some recent data might be hidden       Plan:  1. INR is therapeutic today- see above in Anticoagulation Summary.   Will instruct Yana eLhman to continue their warfarin regimen- see above in Anticoagulation Summary.  2. Follow up in 4 weeks.  3. Patient declines warfarin refills.  4. Verbal and written information provided. Patient expresses understanding and has no further questions at this time.    Rita Bobby

## 2019-08-15 ENCOUNTER — APPOINTMENT (OUTPATIENT)
Dept: PHARMACY | Facility: HOSPITAL | Age: 64
End: 2019-08-15

## 2019-09-16 ENCOUNTER — ANTICOAGULATION VISIT (OUTPATIENT)
Dept: PHARMACY | Facility: HOSPITAL | Age: 64
End: 2019-09-16

## 2019-09-16 DIAGNOSIS — Z95.2 STATUS POST MITRAL VALVE REPLACEMENT: ICD-10-CM

## 2019-09-16 DIAGNOSIS — Z79.01 WARFARIN ANTICOAGULATION: ICD-10-CM

## 2019-09-16 LAB
INR PPP: 2.8 (ref 0.91–1.09)
PROTHROMBIN TIME: 33 SECONDS (ref 10–13.8)

## 2019-09-16 PROCEDURE — 36416 COLLJ CAPILLARY BLOOD SPEC: CPT

## 2019-09-16 PROCEDURE — 85610 PROTHROMBIN TIME: CPT

## 2019-09-16 NOTE — PROGRESS NOTES
Anticoagulation Clinic Progress Note    Anticoagulation Summary  As of 2019    INR goal:   2.5-3.5   TTR:   72.4 % (11.6 mo)   INR used for dosin.8 (2019)   Warfarin maintenance plan:   5 mg every Sun, Thu; 7.5 mg all other days   Weekly warfarin total:   47.5 mg   No change documented:   Carmita Mares   Plan last modified:   Gildardo Baxter [facility] (2018)   Next INR check:   10/14/2019   Priority:   Maintenance   Target end date:   Indefinite    Indications    Status post mitral valve replacement [Z95.2]  Warfarin anticoagulation [Z79.01]             Anticoagulation Episode Summary     INR check location:       Preferred lab:       Send INR reminders to:    JOSE CRAWFORD [facility]    Comments:         Anticoagulation Care Providers     Provider Role Specialty Phone number    Lindsey Vargas MD Referring Cardiology [phone]    Nicolasa Kennedy [facility] Respons[facility] [phone]          Clinic Interview:      INR History:  Anticoagulation Monitoring 2019   INR 3.2 3.3 2.8   INR Date 2019   INR Goal 2.5-3.5 2.5-3.5 2.5-3.5   Trend Same Same Same   Last Week Total 47.5 mg 47.5 mg 47.5 mg   Next Week Total 47.5 mg 47.5 mg 47.5 mg   Sun 5 mg 5 mg 5 mg   Mon 7.5 mg 7.5 mg 7.5 mg   Tue 7.5 mg 7.5 mg 7.5 mg   Wed 7.5 mg 7.5 mg 7.5 mg   Thu 5 mg 5 mg 5 mg   Fri 7.5 mg 7.5 mg 7.5 mg   Sat 7.5 mg 7.5 mg 7.5 mg   Visit Report - - -   Some recent data might be hidden       Plan:  1. INR is therapeutic today- see above in Anticoagulation Summary.   Will instruct Yana Lehman to continue their warfarin regimen- see above in Anticoagulation Summary.  2. Follow up in 4 weeks.  3. Patient declines warfarin refills.  4. Verbal and written information provided. Patient expresses understanding and has no further questions at this time.    Carmita Mares

## 2019-10-17 ENCOUNTER — ANTICOAGULATION VISIT (OUTPATIENT)
Dept: PHARMACY | Facility: HOSPITAL | Age: 64
End: 2019-10-17

## 2019-10-17 DIAGNOSIS — Z79.01 WARFARIN ANTICOAGULATION: ICD-10-CM

## 2019-10-17 DIAGNOSIS — Z95.2 STATUS POST MITRAL VALVE REPLACEMENT: ICD-10-CM

## 2019-10-17 LAB
INR PPP: 2.5 (ref 0.91–1.09)
PROTHROMBIN TIME: 30.5 SECONDS (ref 10–13.8)

## 2019-10-17 PROCEDURE — 85610 PROTHROMBIN TIME: CPT

## 2019-10-17 PROCEDURE — 36416 COLLJ CAPILLARY BLOOD SPEC: CPT

## 2019-10-17 NOTE — PROGRESS NOTES
I have supervised and reviewed the notes, assessments, and/or procedures performed by Vee Sims, PharmD Candidate. I concur with her documentation of this patient.    Grady Salazar RP

## 2019-10-17 NOTE — PROGRESS NOTES
Anticoagulation Clinic Progress Note    Anticoagulation Summary  As of 10/17/2019    INR goal:   2.5-3.5   TTR:   74.6 % (1 y)   INR used for dosin.5 (10/17/2019)   Warfarin maintenance plan:   5 mg every Sun, Thu; 7.5 mg all other days   Weekly warfarin total:   47.5 mg   No change documented:   Vee Sims, Pharmacy Intern   Plan last modified:   Gildardo Baxter [facility] (2018)   Next INR check:   2019   Priority:   Maintenance   Target end date:   Indefinite    Indications    Status post mitral valve replacement [Z95.2]  Warfarin anticoagulation [Z79.01]             Anticoagulation Episode Summary     INR check location:       Preferred lab:       Send INR reminders to:    JOSE CRAWFORD [facility]    Comments:         Anticoagulation Care Providers     Provider Role Specialty Phone number    Lindsey Vargas MD Referring Cardiology [phone]    Nicolasa Kennedy [facility] [phone]          Clinic Interview:  Patient Findings     Negatives:   Signs/symptoms of thrombosis, Signs/symptoms of bleeding,   Laboratory test error suspected, Change in health, Change in alcohol use,   Change in activity, Upcoming invasive procedure, Emergency department   visit, Upcoming dental procedure, Missed doses, Extra doses, Change in   medications, Change in diet/appetite, Hospital admission, Bruising, Other   complaints      Clinical Outcomes     Negatives:   Major bleeding event, Thromboembolic event,   Anticoagulation-related hospital admission, Anticoagulation-related ED   visit, Anticoagulation-related fatality        INR History:  Anticoagulation Monitoring 2019 2019 10/17/2019   INR 3.3 2.8 2.5   INR Date 2019 2019 10/17/2019   INR Goal 2.5-3.5 2.5-3.5 2.5-3.5   Trend Same Same Same   Last Week Total 47.5 mg 47.5 mg 47.5 mg   Next Week Total 47.5 mg 47.5 mg 47.5 mg   Sun 5 mg 5 mg 5 mg   Mon 7.5 mg 7.5 mg 7.5 mg   Tue 7.5 mg 7.5 mg 7.5 mg   Wed 7.5 mg 7.5  mg 7.5 mg   Thu 5 mg 5 mg 5 mg   Fri 7.5 mg 7.5 mg 7.5 mg   Sat 7.5 mg 7.5 mg 7.5 mg   Visit Report - - -   Some recent data might be hidden       Plan:  1. INR is Therapeutic today- see above in Anticoagulation Summary.  Will instruct Yana Lehman to Continue their warfarin regimen- see above in Anticoagulation Summary.  2. Follow up in 4 weeks  3. Patient declines warfarin refills.  4. Verbal and written information provided. Patient expresses understanding and has no further questions at this time.    Vee Sims, Pharmacy Intern

## 2019-11-04 RX ORDER — WARFARIN SODIUM 5 MG/1
TABLET ORAL
Qty: 45 TABLET | Refills: 0 | Status: SHIPPED | OUTPATIENT
Start: 2019-11-04 | End: 2020-01-07 | Stop reason: SDUPTHER

## 2019-11-12 ENCOUNTER — OFFICE VISIT (OUTPATIENT)
Dept: CARDIOLOGY | Facility: CLINIC | Age: 64
End: 2019-11-12

## 2019-11-12 ENCOUNTER — ANTICOAGULATION VISIT (OUTPATIENT)
Dept: PHARMACY | Facility: HOSPITAL | Age: 64
End: 2019-11-12

## 2019-11-12 VITALS
WEIGHT: 108.8 LBS | SYSTOLIC BLOOD PRESSURE: 130 MMHG | DIASTOLIC BLOOD PRESSURE: 82 MMHG | HEIGHT: 62 IN | BODY MASS INDEX: 20.02 KG/M2 | HEART RATE: 79 BPM

## 2019-11-12 DIAGNOSIS — I27.20 PULMONARY HYPERTENSION (HCC): ICD-10-CM

## 2019-11-12 DIAGNOSIS — I34.2 NON-RHEUMATIC MITRAL VALVE STENOSIS: Primary | ICD-10-CM

## 2019-11-12 DIAGNOSIS — Z95.2 STATUS POST MITRAL VALVE REPLACEMENT: ICD-10-CM

## 2019-11-12 DIAGNOSIS — I48.19 PERSISTENT ATRIAL FIBRILLATION (HCC): ICD-10-CM

## 2019-11-12 DIAGNOSIS — I42.9 CARDIOMYOPATHY, UNSPECIFIED TYPE (HCC): ICD-10-CM

## 2019-11-12 DIAGNOSIS — Z79.01 WARFARIN ANTICOAGULATION: ICD-10-CM

## 2019-11-12 DIAGNOSIS — I07.1 TRICUSPID VALVE INSUFFICIENCY, UNSPECIFIED ETIOLOGY: ICD-10-CM

## 2019-11-12 LAB
INR PPP: 2.8 (ref 0.91–1.09)
PROTHROMBIN TIME: 34.1 SECONDS (ref 10–13.8)

## 2019-11-12 PROCEDURE — 93000 ELECTROCARDIOGRAM COMPLETE: CPT | Performed by: INTERNAL MEDICINE

## 2019-11-12 PROCEDURE — 99214 OFFICE O/P EST MOD 30 MIN: CPT | Performed by: INTERNAL MEDICINE

## 2019-11-12 PROCEDURE — 36416 COLLJ CAPILLARY BLOOD SPEC: CPT

## 2019-11-12 PROCEDURE — 85610 PROTHROMBIN TIME: CPT

## 2019-11-12 RX ORDER — LOSARTAN POTASSIUM 50 MG/1
50 TABLET ORAL NIGHTLY
Qty: 90 TABLET | Refills: 3 | Status: SHIPPED | OUTPATIENT
Start: 2019-11-12 | End: 2019-12-19 | Stop reason: SDUPTHER

## 2019-11-12 RX ORDER — GLUCOSAMINE/D3/BOSWELLIA SERRA 1500MG-400
1 TABLET ORAL DAILY
COMMUNITY

## 2019-11-12 RX ORDER — METOPROLOL SUCCINATE 25 MG/1
25 TABLET, EXTENDED RELEASE ORAL 2 TIMES DAILY
Qty: 180 TABLET | Refills: 3 | Status: SHIPPED | OUTPATIENT
Start: 2019-11-12 | End: 2020-12-07 | Stop reason: SDUPTHER

## 2019-11-12 RX ORDER — DIGOXIN 125 MCG
125 TABLET ORAL DAILY
Qty: 90 TABLET | Refills: 3 | Status: SHIPPED | OUTPATIENT
Start: 2019-11-12 | End: 2020-12-07 | Stop reason: SDUPTHER

## 2019-11-12 RX ORDER — ALENDRONATE SODIUM 70 MG/1
70 TABLET ORAL
COMMUNITY
Start: 2019-08-30 | End: 2020-08-29

## 2019-11-12 NOTE — PROGRESS NOTES
Anticoagulation Clinic Progress Note    Anticoagulation Summary  As of 2019    INR goal:   2.5-3.5   TTR:   76.3 % (1.1 y)   INR used for dosin.8 (2019)   Warfarin maintenance plan:   5 mg every Sun, Thu; 7.5 mg all other days   Weekly warfarin total:   47.5 mg   No change documented:   Rajani Stallings [facility]   Plan last modified:   Gildardo Baxter [facility] (2018)   Next INR check:   12/10/2019   Priority:   Maintenance   Target end date:   Indefinite    Indications    Status post mitral valve replacement [Z95.2]  Warfarin anticoagulation [Z79.01]             Anticoagulation Episode Summary     INR check location:       Preferred lab:       Send INR reminders to:   NEVA CRAWFORD [facility]    Comments:         Anticoagulation Care Providers     Provider Role Specialty Phone number    Lindsey Vargas MD Referring Cardiology [phone]    Nicolasa Kennedy [facility] Respons[facility] [phone]          Clinic Interview:  Patient Findings     Negatives:   Signs/symptoms of thrombosis, Signs/symptoms of bleeding,   Laboratory test error suspected, Change in health, Change in alcohol use,   Change in activity, Upcoming invasive procedure, Emergency department   visit, Upcoming dental procedure, Missed doses, Extra doses, Change in   medications, Change in diet/appetite, Hospital admission, Bruising, Other   complaints      Clinical Outcomes     Negatives:   Major bleeding event, Thromboembolic event,   Anticoagulation-related hospital admission, Anticoagulation-related ED   visit, Anticoagulation-related fatality        INR History:  Anticoagulation Monitoring 2019 10/17/2019 2019   INR 2.8 2.5 2.8   INR Date 2019 10/17/2019 2019   INR Goal 2.5-3.5 2.5-3.5 2.5-3.5   Trend Same Same Same   Last Week Total 47.5 mg 47.5 mg 47.5 mg   Next Week Total 47.5 mg 47.5 mg 47.5 mg   Sun 5 mg 5 mg 5 mg   Mon 7.5 mg 7.5 mg 7.5 mg   Tue 7.5 mg 7.5 mg 7.5 mg   Wed 7.5 mg 7.5 mg 7.5 mg    Thu 5 mg 5 mg 5 mg   Fri 7.5 mg 7.5 mg 7.5 mg   Sat 7.5 mg 7.5 mg 7.5 mg   Visit Report - - -   Some recent data might be hidden       Plan:  1. INR is Therapeutic today- see above in Anticoagulation Summary.  Will instruct Yana Lehman to Continue their warfarin regimen- see above in Anticoagulation Summary.  2. Follow up in 4 weeks  3. Patient declines warfarin refills.  4. Verbal and written information provided. Patient expresses understanding and has no further questions at this time.    Rajani Stallings, [facility]

## 2019-11-12 NOTE — PROGRESS NOTES
Date of Office Visit: 2019  Encounter Provider: Lindsey Vargas MD  Place of Service: [facility] CARDIOLOGY  Patient Name: Yana Lehman  :1955      Patient ID:  Yana Lehman is a 64 y.o. female is here for  followup for mechanical MV, cardiomyopathy.         History of Present Illness    She came in through the emergency department on  06/10/2015 with atrial fibrillation and rapid ventricular response.  At that time, she was  tachycardic and hypotensive.  Her cardiac output was very poor.  Her hands and feet were  cool.  Her skin was overall kind of dusky and almost jaundice in appearance.  She was from  a cardiovascular standpoint very unstable.  A stat echocardiogram was ordered and she was  started on fluid boluses.  Her echocardiogram showed an ejection fraction of 50% to 55%  with severe left atrial dilation, severe right ventricular dilation, severe reduction of  right ventricular systolic function, severe right atrial dilation, trace to mild aortic  insufficiency, severely thickened mitral leaflets with grade 3 mobility of the leaflets  and severe mitral stenosis.  The mean gradient across the valve was 14 mmHg.  Her right  ventricular systolic pressure was 94 mmHg and there was moderate tricuspid insufficiency.   An emergent KITTY was done which showed severe left atrial dilation, severely dilated right  ventricle with severe reduction of right ventricular systolic function, severe mitral  stenosis, mild to moderate mitral insufficiency, and moderate tricuspid insufficiency.   She had a cardiac catheterization done on 06/10/2015 showing normal coronary arteries,  moderate to severe pulmonary hypertension, mildly depressed cardiac output, and severely  elevated peripheral vascular resistance.  Her PA pressure was a mean of 55 mmHg.  The RV  pressure was 63/3.  The right atrial pressure was 25.  Dr. Márquez saw her immediately and  took her to the operating  room where she had emergency mitral valve replacement with a 31  mm Medtronic mechanical valve.  Preservation of the papillary muscle with two [place]  Neochord.       She presented [facility] on 3/[facility]/18 and was noted to have been noncompliant for several years due to depression in social situations. She stopped many of her medications, but did remain on her warfarin.  She presented with fatigue and palpitations.  She had a 2-D echocardiogram completed which revealed calculated EF of 32% and estimated EF of 21-25%, left ventricular cavity mild to moderately dilated, wall motion abnormalities, left atrium and right ventricle cavity moderately dilated, mechanical mitral valve prosthesis present and grossly normal, moderate tricuspid valve regurgitation, and mild pulmonary hypertension with RVSP of 41 mmHg.  She was noted to have a chads 2 VASC score of 2 and was recommended to start carvedilol, digoxin, furosemide, potassium, and warfarin.        An echocardiogram done 5/23/18 showing ejection fraction of 30%, global longitudinal strain of -12%, global hypokinesis, mild to moderate left ventricular dilation, severe biatrial enlargement, mechanical Medtronic valve which is grossly normal, RVSP of 33 mmHg.  Stress nuclear Study done 9/5/2018 showed no evidence of ischemia, mild septal hypokinesis but normal ejection fraction.      She had an echocardiogram done 5/20/2019 showing RVSP 38, ejection fraction 46 - 50%, normal-appearing 31 mm Medtronic bileaflet mechanical valve, mean gradient 4, peak gradient 12.      Labs on 9/4/2019 show normal CMP, hemoglobin A1C 5.5, HDL 55, , normal CBC.  Her digoxin level was normal.     He has no chest pain, pressure, tachycardia, dizziness, syncope.  Her energy level is good.  She is taking her medications as directed.  She does check her blood pressure at home and it runs 120s over 80s.  She has no orthopnea or PND.  She has no exertional dyspnea.  She is having right  knee pain and will be seen orthopedic surgeon today from [facility].  It does swell for her and makes it difficult at times to be active.  She has no lower extremity edema.  She is overall feeling quite well.    Past Medical History:   Diagnosis Date   • Acute bronchitis    • Acute kidney injury (CMS/HCC)     after surgery   • Acute systolic congestive heart failure (CMS/HCC) 3/5/2018   • Cachexia (CMS/HCC)    • Cardiomyopathy (CMS/HCC)     unspecified type   • Depression    • H/O shortness of breath    • Mitral valve stenosis     severe; s/p mitral valve replacement    • Persistent atrial fibrillation     on Toprol and warfarin   • Pneumothorax    • Pulmonary hypertension (CMS/HCC)    • Rheumatic fever    • Sinus tachycardia    • Tricuspid valve insufficiency 3/15/2018   • Warfarin anticoagulation 03/15/2018    followed by [facility] INR clinic         Past Surgical History:   Procedure Laterality Date   • CARDIAC CATHETERIZATION      procedure outcome: successful   • FOOT SURGERY     • MITRAL VALVE REPLACEMENT  06/2015    Subhash Márquez   • MITRAL VALVE REPLACEMENT     • TONSILLECTOMY         Current Outpatient Medications on File Prior to Visit   Medication Sig Dispense Refill   • Biotin 54179 MCG tablet Take 1 tablet by mouth Daily.     • Cholecalciferol (VITAMIN D-3) 125 MCG (5000 UT) tablet Take 1 tablet by mouth Daily.     • DIGOX 125 MCG tablet TAKE ONE TABLET BY MOUTH DAILY 30 tablet 5   • losartan (COZAAR) 25 MG tablet Take 1 tablet by mouth Every Night. 90 tablet 1   • metoprolol succinate XL (TOPROL-XL) 25 MG 24 hr tablet TAKE ONE TABLET BY MOUTH TWICE A DAY 60 tablet 6   • warfarin (COUMADIN) 5 MG tablet Take 5mg (1 tablet) on Sunday and Thurs and 7.5mg (1.5 tablets) all the other days or as directed by the Medication Management [facility]. 130 tablet 0   • warfarin (COUMADIN) 5 MG tablet TAKE ONE TABLET BY MOUTH DAILY ON SUNDAY AND THURSDAY AND TAKE 1.5 TABLETS ALL OTHER DAYS OR AS DIRECTED BY THE MEDICATION  MANAGEMENT CLINIC 45 tablet 0   • alendronate (FOSAMAX) 70 MG tablet Take 70 mg by mouth Every 7 (Seven) Days.       No current facility-administered medications on file prior to visit.        Social History     Socioeconomic History   • Marital status: Single     Spouse name: Not on file   • Number of children: Not on file   • Years of education: Not on file   • Highest education level: Not on file   Tobacco Use   • Smoking status: Former Smoker     Last attempt to quit: 2018     Years since quittin.7   • Smokeless tobacco: Never Used   Substance and Sexual Activity   • Alcohol use: No     Comment: caffeine use   • Drug use: No           Review of Systems   Constitution: Negative.   HENT: Negative for congestion.    Eyes: Negative for vision loss in left eye and vision loss in right eye.   Respiratory: Negative.  Negative for cough, hemoptysis, shortness of breath, sleep disturbances due to breathing, snoring, sputum production and wheezing.    Endocrine: Negative.    Hematologic/Lymphatic: Negative.    Skin: Negative for poor wound healing and rash.   Musculoskeletal: Negative for falls, gout, muscle cramps and myalgias.   Gastrointestinal: Negative for abdominal pain, diarrhea, dysphagia, hematemesis, melena, nausea and vomiting.   Neurological: Negative for excessive daytime sleepiness, dizziness, headaches, light-headedness, loss of balance, seizures and vertigo.   Psychiatric/Behavioral: Negative for depression and substance abuse. The patient is not nervous/anxious.        Procedures    ECG 12 Lead  Date/Time: 2019 9:46 AM  Performed by: Lindsey Vargas MD  Authorized by: Lindsey Vargas MD   Comparison: compared with previous ECG   Similar to previous ECG  Rhythm: atrial fibrillation    Clinical impression: abnormal EKG                Objective:      Vitals:    19 0934   BP: 130/82   BP Location: Right arm   Patient Position: Sitting   Pulse: 79   Weight: 49.4 kg (108 lb 12.8  "oz)   Height: 157.5 cm (62\")     Body mass index is 19.9 kg/m².    Physical Exam   Constitutional: She is oriented to person, place, and time. She appears well-developed and well-nourished. No distress.   HENT:   Head: Normocephalic and atraumatic.   Eyes: Conjunctivae are normal. No scleral icterus.   Neck: Neck supple. No JVD present. Carotid bruit is not present. No thyromegaly present.   Cardiovascular: Normal rate, S1 normal, S2 normal and intact distal pulses. An irregularly irregular rhythm present.  No extrasystoles are present. PMI is not displaced. Exam reveals no gallop.   No murmur heard.  Pulses:       Carotid pulses are 2+ on the right side, and 2+ on the left side.       Radial pulses are 2+ on the right side, and 2+ on the left side.        Dorsalis pedis pulses are 2+ on the right side, and 2+ on the left side.        Posterior tibial pulses are 2+ on the right side, and 2+ on the left side.   Mechanical click   Pulmonary/Chest: Effort normal and breath sounds normal. No respiratory distress. She has no wheezes. She has no rhonchi. She has no rales. She exhibits no tenderness.   Abdominal: Soft. Bowel sounds are normal. She exhibits no distension, no abdominal bruit and no mass. There is no tenderness.   Musculoskeletal: She exhibits no edema or deformity.   Lymphadenopathy:     She has no cervical adenopathy.   Neurological: She is alert and oriented to person, place, and time. No cranial nerve deficit.   Skin: Skin is warm and dry. No rash noted. She is not diaphoretic. No cyanosis. No pallor. Nails show no clubbing.   Psychiatric: She has a normal mood and affect. Judgment normal.   Vitals reviewed.      Lab Review:       Assessment:      Diagnosis Plan   1. Non-rheumatic mitral valve stenosis     2. Pulmonary hypertension (CMS/HCC)     3. Tricuspid valve insufficiency, unspecified etiology     4. Persistent atrial fibrillation     5. Cardiomyopathy, unspecified type (CMS/HCC)     6. Status post " mitral valve replacement          1. Cardiomyopathy - mild. EF 46-50%. No CHF.   2. Atrial fibrillation - on warfarin, heart rate better on metoprolol and digoxin.   3. S/p MV replacement, mechanical. Functioning well.   4. Pulmonary HTN, resolved.   5. Depression  6. Hypertension, goal <120/80.      Plan:       See wang in 1 year.  Increase losartan to 50mg nightly.

## 2019-12-10 ENCOUNTER — ANTICOAGULATION VISIT (OUTPATIENT)
Dept: PHARMACY | Facility: HOSPITAL | Age: 64
End: 2019-12-10

## 2019-12-10 DIAGNOSIS — Z95.2 STATUS POST MITRAL VALVE REPLACEMENT: ICD-10-CM

## 2019-12-10 DIAGNOSIS — Z79.01 WARFARIN ANTICOAGULATION: ICD-10-CM

## 2019-12-10 LAB
INR PPP: 2.9 (ref 0.91–1.09)
PROTHROMBIN TIME: 34.7 SECONDS (ref 10–13.8)

## 2019-12-10 PROCEDURE — 36416 COLLJ CAPILLARY BLOOD SPEC: CPT

## 2019-12-10 PROCEDURE — 85610 PROTHROMBIN TIME: CPT

## 2019-12-10 NOTE — PROGRESS NOTES
Anticoagulation Clinic Progress Note    Anticoagulation Summary  As of 12/10/2019    INR goal:   2.5-3.5   TTR:   77.8 % (1.2 y)   INR used for dosin.9 (12/10/2019)   Warfarin maintenance plan:   5 mg every Sun, Thu; 7.5 mg all other days   Weekly warfarin total:   47.5 mg   No change documented:   Kirstin Edwards [facility]   Plan last modified:   Gildardo Baxter [facility] (2018)   Next INR check:   2020   Priority:   Maintenance   Target end date:   Indefinite    Indications    Status post mitral valve replacement [Z95.2]  Warfarin anticoagulation [Z79.01]             Anticoagulation Episode Summary     INR check location:       Preferred lab:       Send INR reminders to:    [facility]    Comments:         Anticoagulation Care Providers     Provider Role Specialty Phone number    Lindsey Vargas MD Referring Cardiology [phone]    Nicolasa Kennedy [facility] [phone]          Clinic Interview:  Patient Findings     Negatives:   Signs/symptoms of thrombosis, Signs/symptoms of bleeding,   Laboratory test error suspected, Change in health, Change in alcohol use,   Change in activity, Upcoming invasive procedure, Emergency department   visit, Upcoming dental procedure, Missed doses, Extra doses, Change in   medications, Change in diet/appetite, Hospital admission, Bruising, Other   complaints      Clinical Outcomes     Negatives:   Major bleeding event, Thromboembolic event,   Anticoagulation-related hospital admission, Anticoagulation-related ED   visit, Anticoagulation-related fatality        INR History:  Anticoagulation Monitoring 10/17/2019 2019 12/10/2019   INR 2.5 2.8 2.9   INR Date 10/17/2019 2019 12/10/2019   INR Goal 2.5-3.5 2.5-3.5 2.5-3.5   Trend Same Same Same   Last Week Total 47.5 mg 47.5 mg 47.5 mg   Next Week Total 47.5 mg 47.5 mg 47.5 mg   Sun 5 mg 5 mg 5 mg   Mon 7.5 mg 7.5 mg 7.5 mg   Tue 7.5 mg 7.5 mg 7.5 mg   Wed 7.5 mg 7.5 mg 7.5  mg   Thu 5 mg 5 mg 5 mg   Fri 7.5 mg 7.5 mg 7.5 mg   Sat 7.5 mg 7.5 mg 7.5 mg   Visit Report - - -   Some recent data might be hidden       Plan:  1. INR is Therapeutic today- see above in Anticoagulation Summary.  Will instruct Yana Lehman to Continue their warfarin regimen- see above in Anticoagulation Summary.  2. Follow up in 1 month  3. Patient declines warfarin refills.  4. Verbal and written information provided. Patient expresses understanding and has no further questions at this time.    Kirstin Edwards, [facility]

## 2019-12-19 RX ORDER — LOSARTAN POTASSIUM 25 MG/1
TABLET ORAL
Qty: 90 TABLET | Refills: 0 | OUTPATIENT
Start: 2019-12-19

## 2019-12-19 RX ORDER — LOSARTAN POTASSIUM 50 MG/1
50 TABLET ORAL NIGHTLY
Qty: 90 TABLET | Refills: 3 | Status: SHIPPED | OUTPATIENT
Start: 2019-12-19 | End: 2020-12-07 | Stop reason: SDUPTHER

## 2020-01-07 ENCOUNTER — ANTICOAGULATION VISIT (OUTPATIENT)
Dept: PHARMACY | Facility: HOSPITAL | Age: 65
End: 2020-01-07

## 2020-01-07 DIAGNOSIS — Z79.01 WARFARIN ANTICOAGULATION: ICD-10-CM

## 2020-01-07 DIAGNOSIS — Z95.2 STATUS POST MITRAL VALVE REPLACEMENT: ICD-10-CM

## 2020-01-07 LAB
INR PPP: 3 (ref 0.91–1.09)
PROTHROMBIN TIME: 36.4 SECONDS (ref 10–13.8)

## 2020-01-07 PROCEDURE — 36416 COLLJ CAPILLARY BLOOD SPEC: CPT

## 2020-01-07 PROCEDURE — 85610 PROTHROMBIN TIME: CPT

## 2020-01-07 RX ORDER — WARFARIN SODIUM 5 MG/1
TABLET ORAL
Qty: 130 TABLET | Refills: 0 | Status: SHIPPED | OUTPATIENT
Start: 2020-01-07 | End: 2020-04-02 | Stop reason: SDUPTHER

## 2020-01-07 NOTE — PROGRESS NOTES
Anticoagulation Clinic Progress Note    Anticoagulation Summary  As of 1/7/2020    INR goal:   2.5-3.5   TTR:   79.2 % (1.3 y)   INR used for dosing:   3.0 (1/7/2020)   Warfarin maintenance plan:   5 mg every Sun, Thu; 7.5 mg all other days   Weekly warfarin total:   47.5 mg   No change documented:   Rita Bobby   Plan last modified:   Gildardo Baxter [facility] (12/6/2018)   Next INR check:   2/4/2020   Priority:   Maintenance   Target end date:   Indefinite    Indications    Status post mitral valve replacement [Z95.2]  Warfarin anticoagulation [Z79.01]             Anticoagulation Episode Summary     INR check location:       Preferred lab:       Send INR reminders to:    [facility] CRA[facility]    Comments:         Anticoagulation Care Providers     Provider Role Specialty Phone number    Lindsey Vargas MD Referring Cardiology [phone]    Nicolasa Kennedy [facility] [phone]          Clinic Interview:  Patient Findings     Negatives:   Signs/symptoms of thrombosis, Signs/symptoms of bleeding,   Laboratory test error suspected, Change in health, Change in alcohol use,   Change in activity, Upcoming invasive procedure, Emergency department   visit, Upcoming dental procedure, Missed doses, Extra doses, Change in   medications, Change in diet/appetite, Hospital admission, Bruising, Other   complaints      Clinical Outcomes     Negatives:   Major bleeding event, Thromboembolic event,   Anticoagulation-related hospital admission, Anticoagulation-related ED   visit, Anticoagulation-related fatality        INR History:  Anticoagulation Monitoring 11/12/2019 12/10/2019 1/7/2020   INR 2.8 2.9 3.0   INR Date 11/12/2019 12/10/2019 1/7/2020   INR Goal 2.5-3.5 2.5-3.5 2.5-3.5   Trend Same Same Same   Last Week Total 47.5 mg 47.5 mg 47.5 mg   Next Week Total 47.5 mg 47.5 mg 47.5 mg   Sun 5 mg 5 mg 5 mg   Mon 7.5 mg 7.5 mg 7.5 mg   Tue 7.5 mg 7.5 mg 7.5 mg   Wed 7.5 mg 7.5 mg 7.5 mg   Thu 5 mg 5  mg 5 mg   Fri 7.5 mg 7.5 mg 7.5 mg   Sat 7.5 mg 7.5 mg 7.5 mg   Visit Report - - -   Some recent data might be hidden       Plan:  1. INR is therapeutic today- see above in Anticoagulation Summary.   Will instruct Yana Lehman to continue their warfarin regimen- see above in Anticoagulation Summary.  2. Follow up in 4 weeks.  3. Patient declines warfarin refills.  4. Verbal and written information provided. Patient expresses understanding and has no further questions at this time.    Rita Bobby

## 2020-02-04 ENCOUNTER — APPOINTMENT (OUTPATIENT)
Dept: PHARMACY | Facility: HOSPITAL | Age: 65
End: 2020-02-04

## 2020-02-05 ENCOUNTER — ANTICOAGULATION VISIT (OUTPATIENT)
Dept: PHARMACY | Facility: HOSPITAL | Age: 65
End: 2020-02-05

## 2020-02-05 DIAGNOSIS — Z79.01 WARFARIN ANTICOAGULATION: ICD-10-CM

## 2020-02-05 DIAGNOSIS — Z95.2 STATUS POST MITRAL VALVE REPLACEMENT: ICD-10-CM

## 2020-02-05 LAB
INR PPP: 2.5 (ref 0.91–1.09)
PROTHROMBIN TIME: 30.3 SECONDS (ref 10–13.8)

## 2020-02-05 PROCEDURE — 36416 COLLJ CAPILLARY BLOOD SPEC: CPT

## 2020-02-05 PROCEDURE — 85610 PROTHROMBIN TIME: CPT

## 2020-02-05 NOTE — PROGRESS NOTES
I have supervised and reviewed the notes, assessments, and/or procedures performed by our PharmD Candidate. I concur with the documentation of this patient encounter.    Grady Salazar [facility]

## 2020-02-05 NOTE — PROGRESS NOTES
Anticoagulation Clinic Progress Note    Anticoagulation Summary  As of 2020    INR goal:   2.5-3.5   TTR:   80.4 % (1.3 y)   INR used for dosin.5 (2020)   Warfarin maintenance plan:   5 mg every Sun, Thu; 7.5 mg all other days   Weekly warfarin total:   47.5 mg   No change documented:   Rose Daniel, Pharmacy Intern   Plan last modified:   Gildardo Baxter [facility] (2018)   Next INR check:   3/4/2020   Priority:   Maintenance   Target end date:   Indefinite    Indications    Status post mitral valve replacement [Z95.2]  Warfarin anticoagulation [Z79.01]             Anticoagulation Episode Summary     INR check location:       Preferred lab:       Send INR reminders to:   [facility]    Comments:         Anticoagulation Care Providers     Provider Role Specialty Phone number    Lindsey Vargas MD Referring Cardiology [phone]    Nicolasa Kennedy [facility] [phone]          Clinic Interview:  Patient Findings     Positives:   Change in diet/appetite    Negatives:   Signs/symptoms of thrombosis, Signs/symptoms of bleeding,   Laboratory test error suspected, Change in health, Change in alcohol use,   Change in activity, Upcoming invasive procedure, Emergency department   visit, Upcoming dental procedure, Missed doses, Extra doses, Change in   medications, Hospital admission, Bruising, Other complaints    Comments:   Incr vit k; 3 salads this past week      Clinical Outcomes     Negatives:   Major bleeding event, Thromboembolic event,   Anticoagulation-related hospital admission, Anticoagulation-related ED   visit, Anticoagulation-related fatality    Comments:   Incr vit k; 3 salads this past week        INR History:  Anticoagulation Monitoring 12/10/2019 2020 2020   INR 2.9 3.0 2.5   INR Date 12/10/2019 2020 2020   INR Goal 2.5-3.5 2.5-3.5 2.5-3.5   Trend Same Same Same   Last Week Total 47.5 mg 47.5 mg 47.5 mg   Next Week Total 47.5 mg  47.5 mg 47.5 mg   Sun 5 mg 5 mg 5 mg   Mon 7.5 mg 7.5 mg 7.5 mg   Tue 7.5 mg 7.5 mg 7.5 mg   Wed 7.5 mg 7.5 mg 7.5 mg   Thu 5 mg 5 mg 5 mg   Fri 7.5 mg 7.5 mg 7.5 mg   Sat 7.5 mg 7.5 mg 7.5 mg   Visit Report - - -   Some recent data might be hidden       Plan:  1. INR is Therapeutic today- see above in Anticoagulation Summary.  Will instruct Yana Lehman to Continue their warfarin regimen- see above in Anticoagulation Summary.  2. Follow up in 1 month  3. Patient declines warfarin refills.  4. Verbal and written information provided. Patient expresses understanding and has no further questions at this time.    Rose Daniel, Pharmacy Intern

## 2020-03-05 ENCOUNTER — ANTICOAGULATION VISIT (OUTPATIENT)
Dept: PHARMACY | Facility: HOSPITAL | Age: 65
End: 2020-03-05

## 2020-03-05 DIAGNOSIS — Z79.01 WARFARIN ANTICOAGULATION: ICD-10-CM

## 2020-03-05 DIAGNOSIS — Z95.2 STATUS POST MITRAL VALVE REPLACEMENT: ICD-10-CM

## 2020-03-05 LAB
INR PPP: 2.5 (ref 0.91–1.09)
PROTHROMBIN TIME: 29.8 SECONDS (ref 10–13.8)

## 2020-03-05 PROCEDURE — 85610 PROTHROMBIN TIME: CPT

## 2020-03-05 PROCEDURE — 36416 COLLJ CAPILLARY BLOOD SPEC: CPT

## 2020-03-05 NOTE — PROGRESS NOTES
I have supervised and reviewed the notes, assessments, and/or procedures performed by our PharmD Candidate. The documented assessment and plan were developed cooperatively. I concur with the documentation of this patient encounter.    Renee Guy RP

## 2020-03-05 NOTE — PROGRESS NOTES
Anticoagulation Clinic Progress Note    Anticoagulation Summary  As of 3/5/2020    INR goal:   2.5-3.5   TTR:   81.5 % (1.4 y)   INR used for dosin.5 (3/5/2020)   Warfarin maintenance plan:   5 mg every Sun, Thu; 7.5 mg all other days   Weekly warfarin total:   47.5 mg   No change documented:   Cresencio Sims, Pharmacy Intern   Plan last modified:   Gildardo Baxter [facility] (2018)   Next INR check:   2020   Priority:   Maintenance   Target end date:   Indefinite    Indications    Status post mitral valve replacement [Z95.2]  Warfarin anticoagulation [Z79.01]             Anticoagulation Episode Summary     INR check location:       Preferred lab:       Send INR reminders to:   [facility]    Comments:         Anticoagulation Care Providers     Provider Role Specialty Phone number    Lindsey Vargas MD Referring Cardiology [phone]    Nicolasa Kennedy [facility] [phone]          Clinic Interview:  Patient Findings     Positives:   Missed doses, Change in diet/appetite    Negatives:   Signs/symptoms of thrombosis, Signs/symptoms of bleeding,   Laboratory test error suspected, Change in health, Change in alcohol use,   Change in activity, Upcoming invasive procedure, Emergency department   visit, Upcoming dental procedure, Extra doses, Change in medications,   Hospital admission, Bruising, Other complaints    Comments:   Pt reports missing dose  & eating more vit K (2x/week   instead of 1x/week)      Clinical Outcomes     Negatives:   Major bleeding event, Thromboembolic event,   Anticoagulation-related hospital admission, Anticoagulation-related ED   visit, Anticoagulation-related fatality    Comments:   Pt reports missing dose  & eating more vit K (2x/week   instead of 1x/week)        INR History:  Anticoagulation Monitoring 2020 2020 3/5/2020   INR 3.0 2.5 2.5   INR Date 2020 2020 3/5/2020   INR Goal 2.5-3.5 2.5-3.5 2.5-3.5   Trend  Same Same Same   Last Week Total 47.5 mg 47.5 mg 47.5 mg   Next Week Total 47.5 mg 47.5 mg 47.5 mg   Sun 5 mg 5 mg 5 mg   Mon 7.5 mg 7.5 mg 7.5 mg   Tue 7.5 mg 7.5 mg 7.5 mg   Wed 7.5 mg 7.5 mg 7.5 mg   Thu 5 mg 5 mg 5 mg   Fri 7.5 mg 7.5 mg 7.5 mg   Sat 7.5 mg 7.5 mg 7.5 mg   Visit Report - - -   Some recent data might be hidden       Plan:  1. INR is Therapeutic today- see above in Anticoagulation Summary.  Will instruct Yana Lehman to Continue their warfarin regimen- see above in Anticoagulation Summary.  2. Follow up in 4 weeks  3. Patient declines warfarin refills.  4. Verbal and written information provided. Patient expresses understanding and has no further questions at this time.    Cresencio Sims, Pharmacy Intern

## 2020-04-02 ENCOUNTER — ANTICOAGULATION VISIT (OUTPATIENT)
Dept: PHARMACY | Facility: HOSPITAL | Age: 65
End: 2020-04-02

## 2020-04-02 DIAGNOSIS — Z79.01 WARFARIN ANTICOAGULATION: ICD-10-CM

## 2020-04-02 DIAGNOSIS — Z95.2 STATUS POST MITRAL VALVE REPLACEMENT: ICD-10-CM

## 2020-04-02 LAB
INR PPP: 3.2 (ref 0.91–1.09)
PROTHROMBIN TIME: 38.3 SECONDS (ref 10–13.8)

## 2020-04-02 PROCEDURE — 85610 PROTHROMBIN TIME: CPT

## 2020-04-02 PROCEDURE — 36416 COLLJ CAPILLARY BLOOD SPEC: CPT

## 2020-04-02 RX ORDER — WARFARIN SODIUM 5 MG/1
TABLET ORAL
Qty: 130 TABLET | Refills: 1 | Status: SHIPPED | OUTPATIENT
Start: 2020-04-02 | End: 2020-10-27 | Stop reason: SDUPTHER

## 2020-04-02 NOTE — PROGRESS NOTES
Anticoagulation Clinic Progress Note    Anticoagulation Summary  As of 4/2/2020    INR goal:   2.5-3.5   TTR:   82.4 % (1.5 y)   INR used for dosing:   3.2 (4/2/2020)   Warfarin maintenance plan:   5 mg every Sun, Thu; 7.5 mg all other days   Weekly warfarin total:   47.5 mg   No change documented:   Carmita Mares   Plan last modified:   Gildardo Baxter [facility] (12/6/2018)   Next INR check:   5/28/2020   Priority:   Maintenance   Target end date:   Indefinite    Indications    Status post mitral valve replacement [Z95.2]  Warfarin anticoagulation [Z79.01]             Anticoagulation Episode Summary     INR check location:       Preferred lab:       Send INR reminders to:    JO[facility] CRAWF[facility]    Comments:         Anticoagulation Care Providers     Provider Role Specialty Phone number    Lindsey Vargas MD Referring Cardiology [phone]    Nicolasa Kennedy [facility] [phone]          Clinic Interview:      INR History:  Anticoagulation Monitoring 2/5/2020 3/5/2020 4/2/2020   INR 2.5 2.5 3.2   INR Date 2/5/2020 3/5/2020 4/2/2020   INR Goal 2.5-3.5 2.5-3.5 2.5-3.5   Trend Same Same Same   Last Week Total 47.5 mg 47.5 mg 47.5 mg   Next Week Total 47.5 mg 47.5 mg 47.5 mg   Sun 5 mg 5 mg 5 mg   Mon 7.5 mg 7.5 mg 7.5 mg   Tue 7.5 mg 7.5 mg 7.5 mg   Wed 7.5 mg 7.5 mg 7.5 mg   Thu 5 mg 5 mg 5 mg   Fri 7.5 mg 7.5 mg 7.5 mg   Sat 7.5 mg 7.5 mg 7.5 mg   Visit Report - - -   Some recent data might be hidden       Plan:  1. INR is therapeutic today- see above in Anticoagulation Summary.   Will instruct Yana Lehman to continue their warfarin regimen- see above in Anticoagulation Summary.  2. Follow up in 8 weeks.  3. Patient declines warfarin refills.  4. Verbal and written information provided. Patient expresses understanding and has no further questions at this time.    Carmita Mares

## 2020-04-03 NOTE — PROGRESS NOTES
I have reviewed the notes, assessments, and/or procedures performed by Carmita Mares, I concur with her/his documentation of Yana NORBERTO Lehman. Due to her INR stability, she was scheduled for 8 weeks rather than sooner due to the COVID-19 pandemic.

## 2020-05-28 ENCOUNTER — ANTICOAGULATION VISIT (OUTPATIENT)
Dept: PHARMACY | Facility: HOSPITAL | Age: 65
End: 2020-05-28

## 2020-05-28 DIAGNOSIS — Z79.01 WARFARIN ANTICOAGULATION: ICD-10-CM

## 2020-05-28 DIAGNOSIS — Z95.2 STATUS POST MITRAL VALVE REPLACEMENT: ICD-10-CM

## 2020-05-28 LAB
INR PPP: 3.6 (ref 0.91–1.09)
PROTHROMBIN TIME: 42.8 SECONDS (ref 10–13.8)

## 2020-05-28 PROCEDURE — 85610 PROTHROMBIN TIME: CPT

## 2020-05-28 PROCEDURE — 36416 COLLJ CAPILLARY BLOOD SPEC: CPT

## 2020-05-28 NOTE — PROGRESS NOTES
Anticoagulation Clinic Progress Note    Anticoagulation Summary  As of 5/28/2020    INR goal:   2.5-3.5   TTR:   81.8 % (1.6 y)   INR used for dosing:   3.6! (5/28/2020)   Warfarin maintenance plan:   5 mg every Sun, Thu; 7.5 mg all other days   Weekly warfarin total:   47.5 mg   No change documented:   Irving Tran [facility]   Plan last modified:   Gildardo Baxter [facility] (12/6/2018)   Next INR check:   6/25/2020   Priority:   Maintenance   Target end date:   Indefinite    Indications    Status post mitral valve replacement [Z95.2]  Warfarin anticoagulation [Z79.01]             Anticoagulation Episode Summary     INR check location:       Preferred lab:       Send INR reminders to:    [facility]    Comments:         Anticoagulation Care Providers     Provider Role Specialty Phone number    Lindsey Vargas MD Referring Cardiology [phone]    Nicolasa Kennedy [facility] [phone]          Clinic Interview:  Patient Findings     Positives:   Change in diet/appetite    Negatives:   Signs/symptoms of thrombosis, Signs/symptoms of bleeding,   Laboratory test error suspected, Change in health, Change in alcohol use,   Change in activity, Upcoming invasive procedure, Emergency department   visit, Upcoming dental procedure, Missed doses, Extra doses, Change in   medications, Hospital admission, Bruising, Other complaints    Comments:   Notes a little less greens than normal due to pandemic       Clinical Outcomes     Negatives:   Major bleeding event, Thromboembolic event,   Anticoagulation-related hospital admission, Anticoagulation-related ED   visit, Anticoagulation-related fatality    Comments:   Notes a little less greens than normal due to pandemic         INR History:  Anticoagulation Monitoring 3/5/2020 4/2/2020 5/28/2020   INR 2.5 3.2 3.6   INR Date 3/5/2020 4/2/2020 5/28/2020   INR Goal 2.5-3.5 2.5-3.5 2.5-3.5   Trend Same Same Same   Last Week Total 47.5 mg 47.5 mg 47.5 mg    Next Week Total 47.5 mg 47.5 mg 47.5 mg   Sun 5 mg 5 mg 5 mg   Mon 7.5 mg 7.5 mg 7.5 mg   Tue 7.5 mg 7.5 mg 7.5 mg   Wed 7.5 mg 7.5 mg 7.5 mg   Thu 5 mg 5 mg 5 mg   Fri 7.5 mg 7.5 mg 7.5 mg   Sat 7.5 mg 7.5 mg 7.5 mg   Visit Report - - -   Some recent data might be hidden       Plan:  1. INR is Supratherapeutic today- see above in Anticoagulation Summary.  Will instruct Yana Lehman to Continue their warfarin regimen- see above in Anticoagulation Summary.  2. Follow up in 4 weeks  3. Patient declines warfarin refills.  4. Verbal and written information provided. Patient expresses understanding and has no further questions at this time.    Irving Tran, PharmD

## 2020-06-25 ENCOUNTER — ANTICOAGULATION VISIT (OUTPATIENT)
Dept: PHARMACY | Facility: HOSPITAL | Age: 65
End: 2020-06-25

## 2020-06-25 DIAGNOSIS — Z79.01 WARFARIN ANTICOAGULATION: ICD-10-CM

## 2020-06-25 DIAGNOSIS — Z95.2 STATUS POST MITRAL VALVE REPLACEMENT: ICD-10-CM

## 2020-06-25 LAB
INR PPP: 3.8 (ref 0.91–1.09)
PROTHROMBIN TIME: 45.6 SECONDS (ref 10–13.8)

## 2020-06-25 PROCEDURE — G0463 HOSPITAL OUTPT CLINIC VISIT: HCPCS

## 2020-06-25 PROCEDURE — 36416 COLLJ CAPILLARY BLOOD SPEC: CPT

## 2020-06-25 PROCEDURE — 85610 PROTHROMBIN TIME: CPT

## 2020-06-25 NOTE — PROGRESS NOTES
Anticoagulation Clinic Progress Note    Anticoagulation Summary  As of 6/25/2020    INR goal:   2.5-3.5   TTR:   78.1 % (1.7 y)   INR used for dosing:   3.8! (6/25/2020)   Warfarin maintenance plan:   5 mg every Sun, Tue, Thu; 7.5 mg all other days   Weekly warfarin total:   45 mg   Plan last modified:   Grady Salazar RPH (6/25/2020)   Next INR check:   7/9/2020   Priority:   Maintenance   Target end date:   Indefinite    Indications    Status post mitral valve replacement [Z95.2]  Warfarin anticoagulation [Z79.01]             Anticoagulation Episode Summary     INR check location:       Preferred lab:       Send INR reminders to:    [facility]    Comments:         Anticoagulation Care Providers     Provider Role Specialty Phone number    Lindsey Vargas MD Referring Cardiology [phone]          Clinic Interview:  Patient Findings     Positives:   Change in diet/appetite, Other complaints    Negatives:   Signs/symptoms of thrombosis, Signs/symptoms of bleeding,   Laboratory test error suspected, Change in health, Change in alcohol use,   Change in activity, Upcoming invasive procedure, Emergency department   visit, Upcoming dental procedure, Missed doses, Extra doses, Change in   medications, Hospital admission, Bruising    Comments:   Possibly less vit k r/t pandemic. Close friend passed away   last wk.       Clinical Outcomes     Negatives:   Major bleeding event, Thromboembolic event,   Anticoagulation-related hospital admission, Anticoagulation-related ED   visit, Anticoagulation-related fatality    Comments:   Possibly less vit k r/t pandemic. Close friend passed away   last wk.         INR History:  Anticoagulation Monitoring 4/2/2020 5/28/2020 6/25/2020   INR 3.2 3.6 3.8   INR Date 4/2/2020 5/28/2020 6/25/2020   INR Goal 2.5-3.5 2.5-3.5 2.5-3.5   Trend Same Same Down   Last Week Total 47.5 mg 47.5 mg 47.5 mg   Next Week Total 47.5 mg 47.5 mg 45 mg   Sun 5 mg 5 mg 5 mg   Mon 7.5 mg  7.5 mg 7.5 mg   Tue 7.5 mg 7.5 mg 5 mg   Wed 7.5 mg 7.5 mg 7.5 mg   Thu 5 mg 5 mg 5 mg   Fri 7.5 mg 7.5 mg 7.5 mg   Sat 7.5 mg 7.5 mg 7.5 mg   Visit Report - - -   Some recent data might be hidden       Plan:  1. INR is Supratherapeutic today- see above in Anticoagulation Summary.  Will instruct Yana Lehman to Change their warfarin regimen- see above in Anticoagulation Summary.  2. Follow up in 2 weeks  3. Patient declines warfarin refills.  4. Verbal and written information provided. Patient expresses understanding and has no further questions at this time.    Grady Salazar [facility]

## 2020-07-09 ENCOUNTER — ANTICOAGULATION VISIT (OUTPATIENT)
Dept: PHARMACY | Facility: HOSPITAL | Age: 65
End: 2020-07-09

## 2020-07-09 DIAGNOSIS — Z79.01 WARFARIN ANTICOAGULATION: ICD-10-CM

## 2020-07-09 DIAGNOSIS — Z95.2 STATUS POST MITRAL VALVE REPLACEMENT: ICD-10-CM

## 2020-07-09 LAB
INR PPP: 2.8 (ref 0.91–1.09)
PROTHROMBIN TIME: 33.1 SECONDS (ref 10–13.8)

## 2020-07-09 PROCEDURE — 36416 COLLJ CAPILLARY BLOOD SPEC: CPT

## 2020-07-09 PROCEDURE — 85610 PROTHROMBIN TIME: CPT

## 2020-07-09 NOTE — PROGRESS NOTES
Anticoagulation Clinic Progress Note    Anticoagulation Summary  As of 2020    INR goal:   2.5-3.5   TTR:   77.9 % (1.8 y)   INR used for dosin.8 (2020)   Warfarin maintenance plan:   5 mg every Sun, Tue, Thu; 7.5 mg all other days   Weekly warfarin total:   45 mg   No change documented:   Emely Greene   Plan last modified:   Grady Salazar RPH (2020)   Next INR check:   2020   Priority:   Maintenance   Target end date:   Indefinite    Indications    Status post mitral valve replacement [Z95.2]  Warfarin anticoagulation [Z79.01]             Anticoagulation Episode Summary     INR check location:       Preferred lab:       Send INR reminders to:   [facility]    Comments:         Anticoagulation Care Providers     Provider Role Specialty Phone number    Lindsey Vargas MD Referring Cardiology [phone]          Clinic Interview:  Patient Findings     Negatives:   Signs/symptoms of thrombosis, Signs/symptoms of bleeding,   Laboratory test error suspected, Change in health, Change in alcohol use,   Change in activity, Upcoming invasive procedure, Emergency department   visit, Upcoming dental procedure, Missed doses, Extra doses, Change in   medications, Change in diet/appetite, Hospital admission, Bruising, Other   complaints      Clinical Outcomes     Negatives:   Major bleeding event, Thromboembolic event,   Anticoagulation-related hospital admission, Anticoagulation-related ED   visit, Anticoagulation-related fatality        INR History:  Anticoagulation Monitoring 2020   INR 3.6 3.8 2.8   INR Date 2020   INR Goal 2.5-3.5 2.5-3.5 2.5-3.5   Trend Same Down Same   Last Week Total 47.5 mg 47.5 mg 45 mg   Next Week Total 47.5 mg 45 mg 45 mg   Sun 5 mg 5 mg 5 mg   Mon 7.5 mg 7.5 mg 7.5 mg   Tue 7.5 mg 5 mg 5 mg   Wed 7.5 mg 7.5 mg 7.5 mg   Thu 5 mg 5 mg 5 mg   Fri 7.5 mg 7.5 mg 7.5 mg   Sat 7.5 mg 7.5 mg 7.5 mg    Visit Report - - -   Some recent data might be hidden       Plan:  1. INR is therapeutic today- see above in Anticoagulation Summary.   Will instruct Yana Lehman to continue their warfarin regimen- see above in Anticoagulation Summary.  2. Follow up in 6 weeks.  3. Patient declines warfarin refills.  4. Verbal and written information provided. Patient expresses understanding and has no further questions at this time.    Emely Greene

## 2020-08-24 ENCOUNTER — ANTICOAGULATION VISIT (OUTPATIENT)
Dept: PHARMACY | Facility: HOSPITAL | Age: 65
End: 2020-08-24

## 2020-08-24 DIAGNOSIS — Z95.2 STATUS POST MITRAL VALVE REPLACEMENT: ICD-10-CM

## 2020-08-24 DIAGNOSIS — Z79.01 WARFARIN ANTICOAGULATION: ICD-10-CM

## 2020-08-24 LAB
INR PPP: 3 (ref 0.91–1.09)
PROTHROMBIN TIME: 36.3 SECONDS (ref 10–13.8)

## 2020-08-24 PROCEDURE — 36416 COLLJ CAPILLARY BLOOD SPEC: CPT

## 2020-08-24 PROCEDURE — 85610 PROTHROMBIN TIME: CPT

## 2020-09-24 ENCOUNTER — ANTICOAGULATION VISIT (OUTPATIENT)
Dept: PHARMACY | Facility: HOSPITAL | Age: 65
End: 2020-09-24

## 2020-09-24 DIAGNOSIS — Z95.2 STATUS POST MITRAL VALVE REPLACEMENT: ICD-10-CM

## 2020-09-24 DIAGNOSIS — Z79.01 WARFARIN ANTICOAGULATION: ICD-10-CM

## 2020-09-24 LAB
INR PPP: 2.3 (ref 0.91–1.09)
PROTHROMBIN TIME: 27.5 SECONDS (ref 10–13.8)

## 2020-09-24 PROCEDURE — 36416 COLLJ CAPILLARY BLOOD SPEC: CPT

## 2020-09-24 PROCEDURE — 85610 PROTHROMBIN TIME: CPT

## 2020-09-24 PROCEDURE — G0463 HOSPITAL OUTPT CLINIC VISIT: HCPCS

## 2020-09-24 NOTE — PROGRESS NOTES
Anticoagulation Clinic Progress Note    Anticoagulation Summary  As of 2020    INR goal:  2.5-3.5   TTR:  79.1 % (2 y)   INR used for dosin.3 (2020)   Warfarin maintenance plan:  5 mg every Sun, Tue, Thu; 7.5 mg all other days   Weekly warfarin total:  45 mg   Plan last modified:  Grady Salazar RPH (2020)   Next INR check:  10/8/2020   Priority:  Maintenance   Target end date:  Indefinite    Indications    Status post mitral valve replacement [Z95.2]  Warfarin anticoagulation [Z79.01]             Anticoagulation Episode Summary     INR check location:      Preferred lab:      Send INR reminders to:   [facility]    Comments:        Anticoagulation Care Providers     Provider Role Specialty Phone number    Lindsey Vargas MD Referring Cardiology [phone]          Clinic Interview:  Patient Findings     Positives:  Missed doses    Negatives:  Signs/symptoms of thrombosis, Signs/symptoms of bleeding,   Laboratory test error suspected, Change in health, Change in alcohol use,   Change in activity, Upcoming invasive procedure, Emergency department   visit, Upcoming dental procedure, Extra doses, Change in medications,   Change in diet/appetite, Hospital admission, Bruising, Other complaints    Comments:  Missed a dose.       Clinical Outcomes     Negatives:  Major bleeding event, Thromboembolic event,   Anticoagulation-related hospital admission, Anticoagulation-related ED   visit, Anticoagulation-related fatality    Comments:  Missed a dose.         INR History:  Anticoagulation Monitoring 2020   INR 2.8 3.0 2.3   INR Date 2020   INR Goal 2.5-3.5 2.5-3.5 2.5-3.5   Trend Same Same Same   Last Week Total 45 mg 45 mg 37.5 mg   Next Week Total 45 mg 45 mg 47.5 mg   Sun 5 mg 5 mg 5 mg   Mon 7.5 mg 7.5 mg 7.5 mg   Tue 5 mg 5 mg 5 mg   Wed 7.5 mg 7.5 mg 7.5 mg   Thu 5 mg 5 mg 7.5 mg (); Otherwise 5 mg   Fri 7.5 mg 7.5 mg 7.5 mg    Sat 7.5 mg 7.5 mg 7.5 mg   Visit Report - - -   Some recent data might be hidden       Plan:  1. INR is Subtherapeutic today- see above in Anticoagulation Summary.  Will instruct Yana Lehman to take a booster dose today of 7.5mg, then resume her current warfarin dosage regimen.   2. Follow up in 2 weeks  3. Patient declines warfarin refills.  4. Verbal and written information provided. Patient expresses understanding and has no further questions at this time.    Kirstin Edwards, [facility]

## 2020-10-08 ENCOUNTER — APPOINTMENT (OUTPATIENT)
Dept: PHARMACY | Facility: HOSPITAL | Age: 65
End: 2020-10-08

## 2020-10-13 ENCOUNTER — ANTICOAGULATION VISIT (OUTPATIENT)
Dept: PHARMACY | Facility: HOSPITAL | Age: 65
End: 2020-10-13

## 2020-10-13 DIAGNOSIS — Z95.2 STATUS POST MITRAL VALVE REPLACEMENT: ICD-10-CM

## 2020-10-13 DIAGNOSIS — Z79.01 WARFARIN ANTICOAGULATION: ICD-10-CM

## 2020-10-13 LAB
INR PPP: 3.1 (ref 0.91–1.09)
PROTHROMBIN TIME: 37 SECONDS (ref 10–13.8)

## 2020-10-13 PROCEDURE — 36416 COLLJ CAPILLARY BLOOD SPEC: CPT

## 2020-10-13 PROCEDURE — 85610 PROTHROMBIN TIME: CPT

## 2020-10-13 NOTE — PROGRESS NOTES
Anticoagulation Clinic Progress Note    Anticoagulation Summary  As of 10/13/2020    INR goal:  2.5-3.5   TTR:  78.9 % (2 y)   INR used for dosing:  3.1 (10/13/2020)   Warfarin maintenance plan:  5 mg every Sun, Tue, Thu; 7.5 mg all other days   Weekly warfarin total:  45 mg   No change documented:  Rita Bobby   Plan last modified:  Grady Salazar RPH (6/25/2020)   Next INR check:  11/3/2020   Priority:  Maintenance   Target end date:  Indefinite    Indications    Status post mitral valve replacement [Z95.2]  Warfarin anticoagulation [Z79.01]             Anticoagulation Episode Summary     INR check location:      Preferred lab:      Send INR reminders to:  [facility]    Comments:        Anticoagulation Care Providers     Provider Role Specialty Phone number    Lindsey Vargas MD Referring Cardiology [phone]          Clinic Interview:  Patient Findings     Negatives:  Signs/symptoms of thrombosis, Signs/symptoms of bleeding,   Laboratory test error suspected, Change in health, Change in alcohol use,   Change in activity, Upcoming invasive procedure, Emergency department   visit, Upcoming dental procedure, Missed doses, Extra doses, Change in   medications, Change in diet/appetite, Hospital admission, Bruising, Other   complaints      Clinical Outcomes     Negatives:  Major bleeding event, Thromboembolic event,   Anticoagulation-related hospital admission, Anticoagulation-related ED   visit, Anticoagulation-related fatality        INR History:  Anticoagulation Monitoring 8/24/2020 9/24/2020 10/13/2020   INR 3.0 2.3 3.1   INR Date 8/24/2020 9/24/2020 10/13/2020   INR Goal 2.5-3.5 2.5-3.5 2.5-3.5   Trend Same Same Same   Last Week Total 45 mg 37.5 mg 45 mg   Next Week Total 45 mg 47.5 mg 45 mg   Sun 5 mg 5 mg 5 mg   Mon 7.5 mg 7.5 mg 7.5 mg   Tue 5 mg 5 mg 5 mg   Wed 7.5 mg 7.5 mg 7.5 mg   Thu 5 mg 7.5 mg (9/24); Otherwise 5 mg 5 mg   Fri 7.5 mg 7.5 mg 7.5 mg   Sat 7.5 mg 7.5 mg 7.5  mg   Visit Report - - -   Some recent data might be hidden       Plan:  1. INR is therapeutic today- see above in Anticoagulation Summary.   Will instruct Yana Lehman to continue their warfarin regimen- see above in Anticoagulation Summary.  2. Follow up in 3 weeks.  3. Patient declines warfarin refills.  4. Verbal and written information provided. Patient expresses understanding and has no further questions at this time.    Rita Bobby

## 2020-10-27 RX ORDER — WARFARIN SODIUM 5 MG/1
TABLET ORAL
Qty: 130 TABLET | Refills: 1 | Status: SHIPPED | OUTPATIENT
Start: 2020-10-27 | End: 2020-10-30 | Stop reason: SDUPTHER

## 2020-10-30 RX ORDER — WARFARIN SODIUM 5 MG/1
TABLET ORAL
Qty: 130 TABLET | Refills: 1 | Status: SHIPPED | OUTPATIENT
Start: 2020-10-30 | End: 2020-10-30 | Stop reason: SDUPTHER

## 2020-10-30 RX ORDER — WARFARIN SODIUM 5 MG/1
TABLET ORAL
Qty: 130 TABLET | Refills: 1 | Status: SHIPPED | OUTPATIENT
Start: 2020-10-30 | End: 2021-02-25 | Stop reason: SDUPTHER

## 2020-11-06 ENCOUNTER — ANTICOAGULATION VISIT (OUTPATIENT)
Dept: PHARMACY | Facility: HOSPITAL | Age: 65
End: 2020-11-06

## 2020-11-06 DIAGNOSIS — Z95.2 STATUS POST MITRAL VALVE REPLACEMENT: ICD-10-CM

## 2020-11-06 DIAGNOSIS — Z79.01 WARFARIN ANTICOAGULATION: ICD-10-CM

## 2020-11-06 LAB
INR PPP: 3.1 (ref 0.91–1.09)
PROTHROMBIN TIME: 37 SECONDS (ref 10–13.8)

## 2020-11-06 PROCEDURE — 36416 COLLJ CAPILLARY BLOOD SPEC: CPT

## 2020-11-06 PROCEDURE — 85610 PROTHROMBIN TIME: CPT

## 2020-11-06 NOTE — PROGRESS NOTES
Anticoagulation Clinic Progress Note    Anticoagulation Summary  As of 11/6/2020    INR goal:  2.5-3.5   TTR:  79.6 % (2.1 y)   INR used for dosing:  3.1 (11/6/2020)   Warfarin maintenance plan:  5 mg every Sun, Tue, Thu; 7.5 mg all other days   Weekly warfarin total:  45 mg   No change documented:  Rita Bobby   Plan last modified:  Grady Salazar RPH (6/25/2020)   Next INR check:  12/7/2020   Priority:  Maintenance   Target end date:  Indefinite    Indications    Status post mitral valve replacement [Z95.2]  Warfarin anticoagulation [Z79.01]             Anticoagulation Episode Summary     INR check location:      Preferred lab:      Send INR reminders to:  [facility]    Comments:        Anticoagulation Care Providers     Provider Role Specialty Phone number    Lindsey Vargas MD Referring Cardiology [phone]          Clinic Interview:  Patient Findings     Negatives:  Signs/symptoms of thrombosis, Signs/symptoms of bleeding,   Laboratory test error suspected, Change in health, Change in alcohol use,   Change in activity, Upcoming invasive procedure, Emergency department   visit, Upcoming dental procedure, Missed doses, Extra doses, Change in   medications, Change in diet/appetite, Hospital admission, Bruising, Other   complaints      Clinical Outcomes     Negatives:  Major bleeding event, Thromboembolic event,   Anticoagulation-related hospital admission, Anticoagulation-related ED   visit, Anticoagulation-related fatality        INR History:  Anticoagulation Monitoring 9/24/2020 10/13/2020 11/6/2020   INR 2.3 3.1 3.1   INR Date 9/24/2020 10/13/2020 11/6/2020   INR Goal 2.5-3.5 2.5-3.5 2.5-3.5   Trend Same Same Same   Last Week Total 37.5 mg 45 mg 45 mg   Next Week Total 47.5 mg 45 mg 45 mg   Sun 5 mg 5 mg 5 mg   Mon 7.5 mg 7.5 mg 7.5 mg   Tue 5 mg 5 mg 5 mg   Wed 7.5 mg 7.5 mg 7.5 mg   Thu 7.5 mg (9/24); Otherwise 5 mg 5 mg 5 mg   Fri 7.5 mg 7.5 mg 7.5 mg   Sat 7.5 mg 7.5 mg 7.5  mg   Visit Report - - -   Some recent data might be hidden       Plan:  1. INR is therapeutic today- see above in Anticoagulation Summary.   Will instruct Yana Lehman to continue their warfarin regimen- see above in Anticoagulation Summary.  2. Follow up in 4 weeks.  3. Patient declines warfarin refills.  4. Verbal and written information provided. Patient expresses understanding and has no further questions at this time.    Rita Bobby

## 2020-12-04 ENCOUNTER — APPOINTMENT (OUTPATIENT)
Dept: PHARMACY | Facility: HOSPITAL | Age: 65
End: 2020-12-04

## 2020-12-07 ENCOUNTER — OFFICE VISIT (OUTPATIENT)
Dept: CARDIOLOGY | Facility: CLINIC | Age: 65
End: 2020-12-07

## 2020-12-07 ENCOUNTER — ANTICOAGULATION VISIT (OUTPATIENT)
Dept: PHARMACY | Facility: HOSPITAL | Age: 65
End: 2020-12-07

## 2020-12-07 VITALS
WEIGHT: 108.4 LBS | OXYGEN SATURATION: 97 % | HEART RATE: 71 BPM | SYSTOLIC BLOOD PRESSURE: 121 MMHG | DIASTOLIC BLOOD PRESSURE: 70 MMHG | BODY MASS INDEX: 19.95 KG/M2 | HEIGHT: 62 IN

## 2020-12-07 DIAGNOSIS — I42.0 DILATED CARDIOMYOPATHY (HCC): Primary | ICD-10-CM

## 2020-12-07 DIAGNOSIS — Z79.01 WARFARIN ANTICOAGULATION: ICD-10-CM

## 2020-12-07 DIAGNOSIS — Z95.2 STATUS POST MITRAL VALVE REPLACEMENT: ICD-10-CM

## 2020-12-07 DIAGNOSIS — I34.2 NON-RHEUMATIC MITRAL VALVE STENOSIS: ICD-10-CM

## 2020-12-07 DIAGNOSIS — I48.19 PERSISTENT ATRIAL FIBRILLATION (HCC): ICD-10-CM

## 2020-12-07 DIAGNOSIS — I27.20 PULMONARY HYPERTENSION (HCC): ICD-10-CM

## 2020-12-07 PROBLEM — I34.0 MITRAL VALVE INSUFFICIENCY: Status: ACTIVE | Noted: 2019-04-18

## 2020-12-07 LAB
INR PPP: 3.3 (ref 0.91–1.09)
PROTHROMBIN TIME: 39.8 SECONDS (ref 10–13.8)

## 2020-12-07 PROCEDURE — 36416 COLLJ CAPILLARY BLOOD SPEC: CPT

## 2020-12-07 PROCEDURE — 99443 PR PHYS/QHP TELEPHONE EVALUATION 21-30 MIN: CPT | Performed by: NURSE PRACTITIONER

## 2020-12-07 PROCEDURE — 85610 PROTHROMBIN TIME: CPT

## 2020-12-07 RX ORDER — LOSARTAN POTASSIUM 50 MG/1
50 TABLET ORAL NIGHTLY
Qty: 90 TABLET | Refills: 3 | Status: SHIPPED | OUTPATIENT
Start: 2020-12-07 | End: 2021-12-08 | Stop reason: SDUPTHER

## 2020-12-07 RX ORDER — METOPROLOL SUCCINATE 25 MG/1
25 TABLET, EXTENDED RELEASE ORAL 2 TIMES DAILY
Qty: 180 TABLET | Refills: 3 | Status: SHIPPED | OUTPATIENT
Start: 2020-12-07 | End: 2022-01-03

## 2020-12-07 RX ORDER — DIGOXIN 125 MCG
125 TABLET ORAL DAILY
Qty: 90 TABLET | Refills: 3 | Status: SHIPPED | OUTPATIENT
Start: 2020-12-07 | End: 2022-01-03

## 2020-12-07 NOTE — PROGRESS NOTES
Anticoagulation Clinic Progress Note    Anticoagulation Summary  As of 12/7/2020    INR goal:  2.5-3.5   TTR:  80.4 % (2.2 y)   INR used for dosing:  3.3 (12/7/2020)   Warfarin maintenance plan:  5 mg every Sun, Tue, Thu; 7.5 mg all other days   Weekly warfarin total:  45 mg   No change documented:  Rita Bobby   Plan last modified:  Grady Salazar RPH (6/25/2020)   Next INR check:  1/4/2021   Priority:  Maintenance   Target end date:  Indefinite    Indications    Status post mitral valve replacement [Z95.2]  Warfarin anticoagulation [Z79.01]             Anticoagulation Episode Summary     INR check location:      Preferred lab:      Send INR reminders to:  [facility]    Comments:        Anticoagulation Care Providers     Provider Role Specialty Phone number    Lindsey Vargas MD Referring Cardiology [phone]          Clinic Interview:  Patient Findings     Negatives:  Signs/symptoms of thrombosis, Signs/symptoms of bleeding,   Laboratory test error suspected, Change in health, Change in alcohol use,   Change in activity, Upcoming invasive procedure, Emergency department   visit, Upcoming dental procedure, Missed doses, Extra doses, Change in   medications, Change in diet/appetite, Hospital admission, Bruising, Other   complaints      Clinical Outcomes     Negatives:  Major bleeding event, Thromboembolic event,   Anticoagulation-related hospital admission, Anticoagulation-related ED   visit, Anticoagulation-related fatality        INR History:  Anticoagulation Monitoring 10/13/2020 11/6/2020 12/7/2020   INR 3.1 3.1 3.3   INR Date 10/13/2020 11/6/2020 12/7/2020   INR Goal 2.5-3.5 2.5-3.5 2.5-3.5   Trend Same Same Same   Last Week Total 45 mg 45 mg 45 mg   Next Week Total 45 mg 45 mg 45 mg   Sun 5 mg 5 mg 5 mg   Mon 7.5 mg 7.5 mg 7.5 mg   Tue 5 mg 5 mg 5 mg   Wed 7.5 mg 7.5 mg 7.5 mg   Thu 5 mg 5 mg 5 mg   Fri 7.5 mg 7.5 mg 7.5 mg   Sat 7.5 mg 7.5 mg 7.5 mg   Visit Report - - -   Some  recent data might be hidden       Plan:  1. INR is therapeutic today- see above in Anticoagulation Summary.   Will instruct Yana Lehman to continue their warfarin regimen- see above in Anticoagulation Summary.  2. Follow up in 4 weeks.  3. Patient declines warfarin refills.  4. Verbal and written information provided. Patient expresses understanding and has no further questions at this time.    Rita Bobby

## 2020-12-07 NOTE — PROGRESS NOTES
Telehealth Visit    Date of Visit: 2020  Encounter Provider: MONICA Wilson  Place of Service: [facility] CARDIOLOGY  Patient Name: Yana Lehman  :1955  Primary Cardiologist: Dr. Lindsey Vargas     Chief Complaint   Patient presents with   • Cardiomyopathy   • Atrial Fibrillation   • Follow-up   :     Dear Andrea Fine,     HPI: Yana Lehman is a pleasant 65 y.o. female who is an established patient of our practice. Due to COVID-19 virus, I am conducting a telehealth visit via audio with patient and she has consented to this visit today.      In , she was diagnosed with atrial fibrillation and underwent cardioversion.  Echocardiogram also showed severe mitral valve stenosis and moderate mitral insufficiency.  In , she underwent mitral valve replacement with a 31 mm [employer] mechanical valve remains on warfarin.  Cardiac catheterization at that time showed normal coronary arteries and moderate severe pulmonary hypertension.    In , she developed recurrent atrial fibrillation.  Echocardiogram at that time showed decreased ejection fraction of 21-25% and normal functioning mechanical valve.  In 2018, nuclear stress test showed no evidence of ischemia and wall motion abnormalities consistent with mild hypokinesis of the septal wall.  She was started on goal-directed medical therapy.    On 2019 an echocardiogram was repeated with the following results: EF 46-50%, diastolic function indeterminate, left atrium moderately dilated, trace mitral regurgitation, normal functioning mitral valve, trace tricuspid regurgitation, and mild pulmonary hypertension with RVSP of 38.1.    Today is her annual follow-up visit.  She lives and takes care of her sister.  She remains active and walks most days when the weather permits.  Her blood pressure is under excellent control.  She recently stopped drinking caffeine for a few days and noticed her  blood pressure getting lower to the 113/59 she felt very fatigued.  She antedates a little bit of caffeine back to her diet and her symptoms have improved.  She denies chest pain, shortness of air, palpitations, edema, dizziness, syncope, or bleeding.  Her INRs are followed at the [facility].  She is due for annual blood work including a digoxin level would like to have that done at her PCP office in 2021.    Past Medical History:   Diagnosis Date   • Acute bronchitis    • Acute kidney injury (CMS/HCC)     after surgery   • Acute systolic congestive heart failure (CMS/HCC) 3/5/2018   • Cachexia (CMS/HCC)    • Cardiomyopathy (CMS/HCC)     unspecified type   • Depression    • H/O shortness of breath    • Mitral valve stenosis     severe; s/p mitral valve replacement    • Persistent atrial fibrillation (CMS/HCC)     on Toprol and warfarin   • Pneumothorax    • Pulmonary hypertension (CMS/HCC)    • Rheumatic fever    • Sinus tachycardia    • Tricuspid valve insufficiency 3/15/2018   • Warfarin anticoagulation 03/15/2018    followed by [facility] INR clinic       Past Surgical History:   Procedure Laterality Date   • CARDIAC CATHETERIZATION      procedure outcome: successful   • FOOT SURGERY     • MITRAL VALVE REPLACEMENT  2015    Subhash Márquez   • MITRAL VALVE REPLACEMENT     • TONSILLECTOMY         Social History     Socioeconomic History   • Marital status: Single     Spouse name: Not on file   • Number of children: Not on file   • Years of education: Not on file   • Highest education level: Not on file   Tobacco Use   • Smoking status: Former Smoker     Quit date: 2018     Years since quittin.8   • Smokeless tobacco: Never Used   Substance and Sexual Activity   • Alcohol use: No     Comment: caffeine use: half and half 3 cups daily   • Drug use: No       Family History   Problem Relation Age of Onset   • Heart failure Mother         congestive   • Cancer Father    • Atrial  "fibrillation Sister    • Hypertension Sister    • Atrial fibrillation Brother    • Heart disease Brother         cardiac pacemaker   • Hypertension Brother        The following portion of the patient's history were reviewed and updated as appropriate: past medical history, past surgical history, past social history, past family history, allergies, current medications, and problem list.    Review of Systems   Constitution: Negative.   Cardiovascular: Negative.    Respiratory: Negative.    Hematologic/Lymphatic: Negative.    Neurological: Negative.        No Known Allergies      Current Outpatient Medications:   •  Biotin 11062 MCG tablet, Take 1 tablet by mouth Daily., Disp: , Rfl:   •  Cholecalciferol (VITAMIN D-3) 125 MCG (5000 UT) tablet, Take 1 tablet by mouth Daily., Disp: , Rfl:   •  digoxin (Digox) 125 MCG tablet, Take 1 tablet by mouth Daily., Disp: 90 tablet, Rfl: 3  •  losartan (COZAAR) 50 MG tablet, Take 1 tablet by mouth Every Night., Disp: 90 tablet, Rfl: 3  •  metoprolol succinate XL (TOPROL-XL) 25 MG 24 hr tablet, Take 1 tablet by mouth 2 (Two) Times a Day., Disp: 180 tablet, Rfl: 3  •  warfarin (COUMADIN) 5 MG tablet, Take 5mg (1 Tab) on Sunday, Tuesday and Thursday and 7.5mg (1 and 1/2 Tabs) all the other days or as directed, Disp: 130 tablet, Rfl: 1        Objective:     Vitals:    12/07/20 0857   BP: 121/70   BP Location: Left arm   Pulse: 71   SpO2: 97%   Weight: 49.2 kg (108 lb 6.4 oz)   Height: 157.5 cm (62\")     Body mass index is 19.83 kg/m².    Due to telehealth visit, there was no EKG, vitals, or weight performed in our office.  Vitals/Weight were reported by the patient and conducted at home.       Assessment:       Diagnosis Plan   1. Dilated cardiomyopathy (CMS/HCC)     2. Non-rheumatic mitral valve stenosis     3. Status post mitral valve replacement     4. Persistent atrial fibrillation (CMS/HCC)     5. Pulmonary hypertension (CMS/HCC)            Plan:       1.  Dilated Cardiomyopathy: " EF improved to 46-50% per echocardiogram in May 2019.  We will continue with losartan and metoprolol succinate for goal-directed medical therapy.  She denies heart failure symptoms today.    2/3.  Mitral Valve Stenosis: Status post mitral valve replacement in 2015.  Echocardiogram last year showed normal functioning mitral valve.  She remains on warfarin.    4.  Persistent Atrial fibrillation: She has known persistent atrial fibrillation which is adequately rate controlled on metoprolol and digoxin.  Continue warfarin for stroke prevention.    5.  Pulmonary Hypertension: Mild per echocardiogram in May 2019.    6.  She is due for annual blood work including a BMP/CMP and digoxin level.  She would like these to be completed at her PCP office in February 2021 and we would appreciate a copy of the lab work.    7.  I have recommended follow-up with Dr. Lindsey Vargas in 1 year, unless otherwise needed sooner.    This patient has consented to a telehealth visit via audio. The visit was scheduled as a audio visit to comply with patient safety concerns in accordance with CDC recommendations.  All vitals recorded within this visit are reported by the patient.  I spent 35 minutes in total including, but not limited to the 25 minutes spent in direct conversation with this patient.      As always, it has been a pleasure to participate in your patient's care. Thank you.       Sincerely,         MONICA Sanchez        Dictated utilizing Dragon dictation

## 2021-01-04 ENCOUNTER — ANTICOAGULATION VISIT (OUTPATIENT)
Dept: PHARMACY | Facility: HOSPITAL | Age: 66
End: 2021-01-04

## 2021-01-04 DIAGNOSIS — Z79.01 WARFARIN ANTICOAGULATION: ICD-10-CM

## 2021-01-04 DIAGNOSIS — Z95.2 STATUS POST MITRAL VALVE REPLACEMENT: ICD-10-CM

## 2021-01-04 LAB
INR PPP: 3 (ref 0.91–1.09)
PROTHROMBIN TIME: 36.2 SECONDS (ref 10–13.8)

## 2021-01-04 PROCEDURE — 36416 COLLJ CAPILLARY BLOOD SPEC: CPT

## 2021-01-04 PROCEDURE — 85610 PROTHROMBIN TIME: CPT

## 2021-01-04 NOTE — PROGRESS NOTES
Anticoagulation Clinic Progress Note    Anticoagulation Summary  As of 1/4/2021    INR goal:  2.5-3.5   TTR:  81.1 % (2.3 y)   INR used for dosing:  3.0 (1/4/2021)   Warfarin maintenance plan:  5 mg every Sun, Tue, Thu; 7.5 mg all other days   Weekly warfarin total:  45 mg   No change documented:  Emely Greene   Plan last modified:  Grady Salazar RPH (6/25/2020)   Next INR check:  2/18/2021   Priority:  Maintenance   Target end date:  Indefinite    Indications    Status post mitral valve replacement [Z95.2]  Warfarin anticoagulation [Z79.01]             Anticoagulation Episode Summary     INR check location:      Preferred lab:      Send INR reminders to:   [facility]    Comments:        Anticoagulation Care Providers     Provider Role Specialty Phone number    Lindsey Vargas MD Referring Cardiology [phone]          Clinic Interview:  Patient Findings     Negatives:  Signs/symptoms of thrombosis, Signs/symptoms of bleeding,   Laboratory test error suspected, Change in health, Change in alcohol use,   Change in activity, Upcoming invasive procedure, Emergency department   visit, Upcoming dental procedure, Missed doses, Extra doses, Change in   medications, Change in diet/appetite, Hospital admission, Bruising, Other   complaints      Clinical Outcomes     Negatives:  Major bleeding event, Thromboembolic event,   Anticoagulation-related hospital admission, Anticoagulation-related ED   visit, Anticoagulation-related fatality        INR History:  Anticoagulation Monitoring 11/6/2020 12/7/2020 1/4/2021   INR 3.1 3.3 3.0   INR Date 11/6/2020 12/7/2020 1/4/2021   INR Goal 2.5-3.5 2.5-3.5 2.5-3.5   Trend Same Same Same   Last Week Total 45 mg 45 mg 45 mg   Next Week Total 45 mg 45 mg 45 mg   Sun 5 mg 5 mg 5 mg   Mon 7.5 mg 7.5 mg 7.5 mg   Tue 5 mg 5 mg 5 mg   Wed 7.5 mg 7.5 mg 7.5 mg   Thu 5 mg 5 mg 5 mg   Fri 7.5 mg 7.5 mg 7.5 mg   Sat 7.5 mg 7.5 mg 7.5 mg   Visit Report - - -    Some recent data might be hidden       Plan:  1. INR is therapeutic today- see above in Anticoagulation Summary.   Will instruct Yana Lehman to continue their warfarin regimen- see above in Anticoagulation Summary.  2. Follow up in 6 weeks.  3. Patient desires warfarin refills.  4. Verbal and written information provided. Patient expresses understanding and has no further questions at this time.    Emely Greene

## 2021-02-18 ENCOUNTER — APPOINTMENT (OUTPATIENT)
Dept: PHARMACY | Facility: HOSPITAL | Age: 66
End: 2021-02-18

## 2021-02-22 ENCOUNTER — TELEPHONE (OUTPATIENT)
Dept: CARDIOLOGY | Facility: CLINIC | Age: 66
End: 2021-02-22

## 2021-02-22 DIAGNOSIS — I48.19 PERSISTENT ATRIAL FIBRILLATION (HCC): Primary | ICD-10-CM

## 2021-02-22 DIAGNOSIS — Z79.01 WARFARIN ANTICOAGULATION: ICD-10-CM

## 2021-02-22 NOTE — TELEPHONE ENCOUNTER
Pt notified and will have labs done here at the main lab. She verbalizes understanding about holding the digoxin and taking it after the labs are drawn that day.

## 2021-02-22 NOTE — TELEPHONE ENCOUNTER
Pt called and states she is unable to obtain her digoxin level blood test from her PCP. Pt would like to know if you can place the order for the main lab at the hospital?     Thank you

## 2021-02-22 NOTE — TELEPHONE ENCOUNTER
I have placed the lab orders.  Please let her know.  Make sure she does not take the digoxin the morning or day of that she has the blood work completed.  When she gets home after the blood work is completed she may take the digoxin.  Thank you so much.

## 2021-02-25 ENCOUNTER — LAB (OUTPATIENT)
Dept: LAB | Facility: HOSPITAL | Age: 66
End: 2021-02-25

## 2021-02-25 ENCOUNTER — ANTICOAGULATION VISIT (OUTPATIENT)
Dept: PHARMACY | Facility: HOSPITAL | Age: 66
End: 2021-02-25

## 2021-02-25 DIAGNOSIS — Z79.01 WARFARIN ANTICOAGULATION: ICD-10-CM

## 2021-02-25 DIAGNOSIS — Z95.2 STATUS POST MITRAL VALVE REPLACEMENT: ICD-10-CM

## 2021-02-25 DIAGNOSIS — I48.19 PERSISTENT ATRIAL FIBRILLATION (HCC): ICD-10-CM

## 2021-02-25 LAB
ALBUMIN SERPL-MCNC: 4.1 G/DL (ref 3.5–5.2)
ALBUMIN/GLOB SERPL: 1.8 G/DL
ALP SERPL-CCNC: 62 U/L (ref 39–117)
ALT SERPL W P-5'-P-CCNC: 14 U/L (ref 1–33)
ANION GAP SERPL CALCULATED.3IONS-SCNC: 9.8 MMOL/L (ref 5–15)
AST SERPL-CCNC: 29 U/L (ref 1–32)
BILIRUB SERPL-MCNC: 0.5 MG/DL (ref 0–1.2)
BUN SERPL-MCNC: 10 MG/DL (ref 8–23)
BUN/CREAT SERPL: 13.7 (ref 7–25)
CALCIUM SPEC-SCNC: 9.4 MG/DL (ref 8.6–10.5)
CHLORIDE SERPL-SCNC: 104 MMOL/L (ref 98–107)
CO2 SERPL-SCNC: 23.2 MMOL/L (ref 22–29)
CREAT SERPL-MCNC: 0.73 MG/DL (ref 0.57–1)
DEPRECATED RDW RBC AUTO: 47.3 FL (ref 37–54)
DIGOXIN SERPL-MCNC: 0.5 NG/ML (ref 0.6–1.2)
ERYTHROCYTE [DISTWIDTH] IN BLOOD BY AUTOMATED COUNT: 13.4 % (ref 12.3–15.4)
GFR SERPL CREATININE-BSD FRML MDRD: 80 ML/MIN/1.73
GLOBULIN UR ELPH-MCNC: 2.3 GM/DL
GLUCOSE SERPL-MCNC: 95 MG/DL (ref 65–99)
HCT VFR BLD AUTO: 40.6 % (ref 34–46.6)
HGB BLD-MCNC: 13 G/DL (ref 12–15.9)
INR PPP: 2.3 (ref 0.91–1.09)
MCH RBC QN AUTO: 30.4 PG (ref 26.6–33)
MCHC RBC AUTO-ENTMCNC: 32 G/DL (ref 31.5–35.7)
MCV RBC AUTO: 95.1 FL (ref 79–97)
PLATELET # BLD AUTO: 181 10*3/MM3 (ref 140–450)
PMV BLD AUTO: 10.7 FL (ref 6–12)
POTASSIUM SERPL-SCNC: 4.6 MMOL/L (ref 3.5–5.2)
PROT SERPL-MCNC: 6.4 G/DL (ref 6–8.5)
PROTHROMBIN TIME: 27.7 SECONDS (ref 10–13.8)
RBC # BLD AUTO: 4.27 10*6/MM3 (ref 3.77–5.28)
SODIUM SERPL-SCNC: 137 MMOL/L (ref 136–145)
WBC # BLD AUTO: 8.51 10*3/MM3 (ref 3.4–10.8)

## 2021-02-25 PROCEDURE — G0463 HOSPITAL OUTPT CLINIC VISIT: HCPCS

## 2021-02-25 PROCEDURE — 80053 COMPREHEN METABOLIC PANEL: CPT

## 2021-02-25 PROCEDURE — 80162 ASSAY OF DIGOXIN TOTAL: CPT

## 2021-02-25 PROCEDURE — 36416 COLLJ CAPILLARY BLOOD SPEC: CPT

## 2021-02-25 PROCEDURE — 85027 COMPLETE CBC AUTOMATED: CPT

## 2021-02-25 PROCEDURE — 36415 COLL VENOUS BLD VENIPUNCTURE: CPT

## 2021-02-25 PROCEDURE — 85610 PROTHROMBIN TIME: CPT

## 2021-02-25 RX ORDER — WARFARIN SODIUM 5 MG/1
TABLET ORAL
Qty: 120 TABLET | Refills: 1 | Status: SHIPPED | OUTPATIENT
Start: 2021-02-25 | End: 2021-03-12 | Stop reason: SDUPTHER

## 2021-02-25 NOTE — PROGRESS NOTES
Anticoagulation Clinic Progress Note    Anticoagulation Summary  As of 2021    INR goal:  2.5-3.5   TTR:  80.5 % (2.4 y)   INR used for dosin.3 (2021)   Warfarin maintenance plan:  5 mg every Sun, Tue, Thu; 7.5 mg all other days   Weekly warfarin total:  45 mg   Plan last modified:  Grady Salazar RPH (2020)   Next INR check:  3/11/2021   Priority:  Maintenance   Target end date:  Indefinite    Indications    Status post mitral valve replacement [Z95.2]  Warfarin anticoagulation [Z79.01]             Anticoagulation Episode Summary     INR check location:      Preferred lab:      Send INR reminders to:   [facility]    Comments:        Anticoagulation Care Providers     Provider Role Specialty Phone number    Lindsey Vargas MD Referring Cardiology [phone]          Clinic Interview:  Patient Findings     Positives:  Change in diet/appetite, Other complaints    Negatives:  Signs/symptoms of thrombosis, Signs/symptoms of bleeding,   Laboratory test error suspected, Change in health, Change in alcohol use,   Change in activity, Upcoming invasive procedure, Emergency department   visit, Upcoming dental procedure, Missed doses, Extra doses, Change in   medications, Hospital admission, Bruising    Comments:  Reports stress r/t helping care for sister. Reports diet has   been different; however, indicates if anything she may have had less vit   k.      Clinical Outcomes     Negatives:  Major bleeding event, Thromboembolic event,   Anticoagulation-related hospital admission, Anticoagulation-related ED   visit, Anticoagulation-related fatality    Comments:  Reports stress r/t helping care for sister. Reports diet has   been different; however, indicates if anything she may have had less vit   k.        INR History:  Anticoagulation Monitoring 2020   INR 3.3 3.0 2.3   INR Date 2020   INR Goal 2.5-3.5 2.5-3.5 2.5-3.5   Trend Same Same  Same   Last Week Total 45 mg 45 mg 45 mg   Next Week Total 45 mg 45 mg 47.5 mg   Sun 5 mg 5 mg 5 mg   Mon 7.5 mg 7.5 mg 7.5 mg   Tue 5 mg 5 mg 5 mg   Wed 7.5 mg 7.5 mg 7.5 mg   Thu 5 mg 5 mg 7.5 mg (2/25); Otherwise 5 mg   Fri 7.5 mg 7.5 mg 7.5 mg   Sat 7.5 mg 7.5 mg 7.5 mg   Visit Report - - -   Some recent data might be hidden       Plan:  1. INR is Subtherapeutic today- see above in Anticoagulation Summary.  Will instruct Yana Lehman to Change their warfarin regimen- see above in Anticoagulation Summary.  2. Follow up in 2 weeks  3. Patient desires warfarin refills.  4. Verbal and written information provided. Patient expresses understanding and has no further questions at this time.    Grady Salazar [facility]

## 2021-02-26 ENCOUNTER — TELEPHONE (OUTPATIENT)
Dept: CARDIOLOGY | Facility: CLINIC | Age: 66
End: 2021-02-26

## 2021-03-01 NOTE — TELEPHONE ENCOUNTER
Notified patient of results. She verbalized understanding.    Maryam Mack, RN  Triage [facility]

## 2021-03-12 ENCOUNTER — ANTICOAGULATION VISIT (OUTPATIENT)
Dept: PHARMACY | Facility: HOSPITAL | Age: 66
End: 2021-03-12

## 2021-03-12 DIAGNOSIS — Z79.01 WARFARIN ANTICOAGULATION: ICD-10-CM

## 2021-03-12 DIAGNOSIS — Z95.2 STATUS POST MITRAL VALVE REPLACEMENT: ICD-10-CM

## 2021-03-12 LAB
INR PPP: 2.4 (ref 0.91–1.09)
PROTHROMBIN TIME: 28.5 SECONDS (ref 10–13.8)

## 2021-03-12 PROCEDURE — 85610 PROTHROMBIN TIME: CPT

## 2021-03-12 PROCEDURE — 36416 COLLJ CAPILLARY BLOOD SPEC: CPT

## 2021-03-12 PROCEDURE — G0463 HOSPITAL OUTPT CLINIC VISIT: HCPCS

## 2021-03-12 RX ORDER — WARFARIN SODIUM 5 MG/1
TABLET ORAL
Qty: 130 TABLET | Refills: 1 | Status: SHIPPED | OUTPATIENT
Start: 2021-03-12 | End: 2021-12-30 | Stop reason: SDUPTHER

## 2021-03-12 NOTE — PROGRESS NOTES
Anticoagulation Clinic Progress Note    Anticoagulation Summary  As of 3/12/2021    INR goal:  2.5-3.5   TTR:  79.1 % (2.4 y)   INR used for dosin.4 (3/12/2021)   Warfarin maintenance plan:  5 mg every Sun, Thu; 7.5 mg all other days   Weekly warfarin total:  47.5 mg   Plan last modified:  Renee Guy RPH (3/12/2021)   Next INR check:  2021   Priority:  Maintenance   Target end date:  Indefinite    Indications    Status post mitral valve replacement [Z95.2]  Warfarin anticoagulation [Z79.01]             Anticoagulation Episode Summary     INR check location:      Preferred lab:      Send INR reminders to:   [facility]    Comments:        Anticoagulation Care Providers     Provider Role Specialty Phone number    Lindsey Vargas MD Referring Cardiology [phone]          Clinic Interview:  Patient Findings     Negatives:  Signs/symptoms of thrombosis, Signs/symptoms of bleeding,   Laboratory test error suspected, Change in health, Change in alcohol use,   Change in activity, Upcoming invasive procedure, Emergency department   visit, Upcoming dental procedure, Missed doses, Extra doses, Change in   medications, Change in diet/appetite, Hospital admission, Bruising, Other   complaints      Clinical Outcomes     Negatives:  Major bleeding event, Thromboembolic event,   Anticoagulation-related hospital admission, Anticoagulation-related ED   visit, Anticoagulation-related fatality        INR History:  Anticoagulation Monitoring 2021 2021 3/12/2021   INR 3.0 2.3 2.4   INR Date 2021 2021 3/12/2021   INR Goal 2.5-3.5 2.5-3.5 2.5-3.5   Trend Same Same Up   Last Week Total 45 mg 45 mg 45 mg   Next Week Total 45 mg 47.5 mg 47.5 mg   Sun 5 mg 5 mg 5 mg   Mon 7.5 mg 7.5 mg 7.5 mg   Tue 5 mg 5 mg 7.5 mg   Wed 7.5 mg 7.5 mg 7.5 mg   Thu 5 mg 7.5 mg (); Otherwise 5 mg 5 mg   Fri 7.5 mg 7.5 mg 7.5 mg   Sat 7.5 mg 7.5 mg 7.5 mg   Visit Report - - -   Some recent data might be  hidden       Plan:  1. INR is Subtherapeutic today- see above in Anticoagulation Summary.  Will instruct Yana Lehman to Increase their warfarin regimen- see above in Anticoagulation Summary.  2. Follow up in 3 weeks  3. Patient declines warfarin refills.  4. Verbal and written information provided. Patient expresses understanding and has no further questions at this time.    Renee Guy [facility]

## 2021-04-02 ENCOUNTER — ANTICOAGULATION VISIT (OUTPATIENT)
Dept: PHARMACY | Facility: HOSPITAL | Age: 66
End: 2021-04-02

## 2021-04-02 DIAGNOSIS — Z95.2 STATUS POST MITRAL VALVE REPLACEMENT: ICD-10-CM

## 2021-04-02 DIAGNOSIS — Z79.01 WARFARIN ANTICOAGULATION: ICD-10-CM

## 2021-04-02 LAB
INR PPP: 3.4 (ref 0.91–1.09)
PROTHROMBIN TIME: 40.6 SECONDS (ref 10–13.8)

## 2021-04-02 PROCEDURE — 85610 PROTHROMBIN TIME: CPT

## 2021-04-02 PROCEDURE — 36416 COLLJ CAPILLARY BLOOD SPEC: CPT

## 2021-04-05 NOTE — PROGRESS NOTES
Anticoagulation Clinic Progress Note    Anticoagulation Summary  As of 4/2/2021    INR goal:  2.5-3.5   TTR:  79.4 % (2.5 y)   INR used for dosing:  3.4 (4/2/2021)   Warfarin maintenance plan:  5 mg every Sun, Thu; 7.5 mg all other days   Weekly warfarin total:  47.5 mg   No change documented:  Rita Bobby   Plan last modified:  Renee Guy RPH (3/12/2021)   Next INR check:  4/30/2021   Priority:  Maintenance   Target end date:  Indefinite    Indications    Status post mitral valve replacement [Z95.2]  Warfarin anticoagulation [Z79.01]             Anticoagulation Episode Summary     INR check location:      Preferred lab:      Send INR reminders to:   [facility]    Comments:        Anticoagulation Care Providers     Provider Role Specialty Phone number    Lindsey Vargas MD Referring Cardiology [phone]          Clinic Interview:  Patient Findings     Negatives:  Signs/symptoms of thrombosis, Signs/symptoms of bleeding,   Laboratory test error suspected, Change in health, Change in alcohol use,   Change in activity, Upcoming invasive procedure, Emergency department   visit, Upcoming dental procedure, Missed doses, Extra doses, Change in   medications, Change in diet/appetite, Hospital admission, Bruising, Other   complaints      Clinical Outcomes     Negatives:  Major bleeding event, Thromboembolic event,   Anticoagulation-related hospital admission, Anticoagulation-related ED   visit, Anticoagulation-related fatality        INR History:  Anticoagulation Monitoring 2/25/2021 3/12/2021 4/2/2021   INR 2.3 2.4 3.4   INR Date 2/25/2021 3/12/2021 4/2/2021   INR Goal 2.5-3.5 2.5-3.5 2.5-3.5   Trend Same Up Same   Last Week Total 45 mg 45 mg 47.5 mg   Next Week Total 47.5 mg 47.5 mg 47.5 mg   Sun 5 mg 5 mg 5 mg   Mon 7.5 mg 7.5 mg 7.5 mg   Tue 5 mg 7.5 mg 7.5 mg   Wed 7.5 mg 7.5 mg 7.5 mg   Thu 7.5 mg (2/25); Otherwise 5 mg 5 mg 5 mg   Fri 7.5 mg 7.5 mg 7.5 mg   Sat 7.5 mg 7.5 mg 7.5 mg    Visit Report - - -   Some recent data might be hidden       Plan:  1. INR is therapeutic today- see above in Anticoagulation Summary.   Will instruct Yana Lehman to continue their warfarin regimen- see above in Anticoagulation Summary.  2. Follow up in 4 weeks.  3. Patient declines warfarin refills.  4. Verbal and written information provided. Patient expresses understanding and has no further questions at this time.    Rita Bobby

## 2021-04-30 ENCOUNTER — ANTICOAGULATION VISIT (OUTPATIENT)
Dept: PHARMACY | Facility: HOSPITAL | Age: 66
End: 2021-04-30

## 2021-04-30 DIAGNOSIS — Z95.2 STATUS POST MITRAL VALVE REPLACEMENT: Primary | ICD-10-CM

## 2021-04-30 DIAGNOSIS — Z79.01 WARFARIN ANTICOAGULATION: ICD-10-CM

## 2021-04-30 LAB
INR PPP: 3 (ref 0.91–1.09)
PROTHROMBIN TIME: 36.1 SECONDS (ref 10–13.8)

## 2021-04-30 PROCEDURE — 85610 PROTHROMBIN TIME: CPT

## 2021-04-30 PROCEDURE — 36416 COLLJ CAPILLARY BLOOD SPEC: CPT

## 2021-05-28 ENCOUNTER — APPOINTMENT (OUTPATIENT)
Dept: PHARMACY | Facility: HOSPITAL | Age: 66
End: 2021-05-28

## 2021-06-04 ENCOUNTER — ANTICOAGULATION VISIT (OUTPATIENT)
Dept: PHARMACY | Facility: HOSPITAL | Age: 66
End: 2021-06-04

## 2021-06-04 DIAGNOSIS — Z95.2 STATUS POST MITRAL VALVE REPLACEMENT: Primary | ICD-10-CM

## 2021-06-04 DIAGNOSIS — Z79.01 WARFARIN ANTICOAGULATION: ICD-10-CM

## 2021-06-04 LAB
INR PPP: 3.9 (ref 0.91–1.09)
PROTHROMBIN TIME: 46.9 SECONDS (ref 10–13.8)

## 2021-06-04 PROCEDURE — G0463 HOSPITAL OUTPT CLINIC VISIT: HCPCS

## 2021-06-04 PROCEDURE — 36416 COLLJ CAPILLARY BLOOD SPEC: CPT

## 2021-06-04 PROCEDURE — 85610 PROTHROMBIN TIME: CPT

## 2021-06-04 RX ORDER — SIMVASTATIN 10 MG
10 TABLET ORAL DAILY
COMMUNITY
Start: 2021-03-11 | End: 2022-03-11

## 2021-06-04 NOTE — PROGRESS NOTES
Anticoagulation Clinic Progress Note    Anticoagulation Summary  As of 6/4/2021    INR goal:  2.5-3.5   TTR:  79.1 % (2.7 y)   INR used for dosing:  3.9 (6/4/2021)   Warfarin maintenance plan:  5 mg every Sun, Thu; 7.5 mg all other days   Weekly warfarin total:  47.5 mg   Plan last modified:  Renee Guy RPH (3/12/2021)   Next INR check:  6/18/2021   Priority:  Maintenance   Target end date:  Indefinite    Indications    Status post mitral valve replacement [Z95.2]  Warfarin anticoagulation [Z79.01]             Anticoagulation Episode Summary     INR check location:      Preferred lab:      Send INR reminders to:   [facility]    Comments:        Anticoagulation Care Providers     Provider Role Specialty Phone number    Lindsey Vargas MD Referring Cardiology [phone]          Clinic Interview:  Patient Findings     Positives:  Change in diet/appetite, Other complaints    Negatives:  Signs/symptoms of thrombosis, Signs/symptoms of bleeding,   Laboratory test error suspected, Change in health, Change in alcohol use,   Change in activity, Upcoming invasive procedure, Emergency department   visit, Upcoming dental procedure, Missed doses, Extra doses, Change in   medications, Hospital admission, Bruising    Comments:  Reports less vit k than usual recently; plans to resume usual   intake. Reports high stress living/caring for sister.       Clinical Outcomes     Negatives:  Major bleeding event, Thromboembolic event,   Anticoagulation-related hospital admission, Anticoagulation-related ED   visit, Anticoagulation-related fatality    Comments:  Reports less vit k than usual recently; plans to resume usual   intake. Reports high stress living/caring for sister.         INR History:  Anticoagulation Monitoring 4/2/2021 4/30/2021 6/4/2021   INR 3.4 3.0 3.9   INR Date 4/2/2021 4/30/2021 6/4/2021   INR Goal 2.5-3.5 2.5-3.5 2.5-3.5   Trend Same Same Same   Last Week Total 47.5 mg 47.5 mg 47.5 mg    Next Week Total 47.5 mg 47.5 mg 45 mg   Sun 5 mg 5 mg 5 mg   Mon 7.5 mg 7.5 mg 7.5 mg   Tue 7.5 mg 7.5 mg 7.5 mg   Wed 7.5 mg 7.5 mg 7.5 mg   Thu 5 mg 5 mg 5 mg   Fri 7.5 mg 7.5 mg 5 mg (6/4); Otherwise 7.5 mg   Sat 7.5 mg 7.5 mg 7.5 mg   Visit Report - - -   Some recent data might be hidden       Plan:  1. INR is Supratherapeutic today- see above in Anticoagulation Summary.  Will instruct Yana Lehman to Change their warfarin regimen- see above in Anticoagulation Summary.  2. Follow up in 2 weeks  3. Patient declines warfarin refills.  4. Verbal and written information provided. Patient expresses understanding and has no further questions at this time.    Grady Salazar [facility]

## 2021-06-18 ENCOUNTER — ANTICOAGULATION VISIT (OUTPATIENT)
Dept: PHARMACY | Facility: HOSPITAL | Age: 66
End: 2021-06-18

## 2021-06-18 DIAGNOSIS — Z79.01 WARFARIN ANTICOAGULATION: ICD-10-CM

## 2021-06-18 DIAGNOSIS — Z95.2 STATUS POST MITRAL VALVE REPLACEMENT: Primary | ICD-10-CM

## 2021-06-18 LAB
INR PPP: 2.9 (ref 0.91–1.09)
PROTHROMBIN TIME: 34.3 SECONDS (ref 10–13.8)

## 2021-06-18 PROCEDURE — 36416 COLLJ CAPILLARY BLOOD SPEC: CPT

## 2021-06-18 PROCEDURE — 85610 PROTHROMBIN TIME: CPT

## 2021-06-18 NOTE — PROGRESS NOTES
Anticoagulation Clinic Progress Note    Anticoagulation Summary  As of 2021    INR goal:  2.5-3.5   TTR:  78.9 % (2.7 y)   INR used for dosin.9 (2021)   Warfarin maintenance plan:  5 mg every Sun, Thu; 7.5 mg all other days   Weekly warfarin total:  47.5 mg   No change documented:  Rita Bobby   Plan last modified:  Renee Guy RPH (3/12/2021)   Next INR check:  2021   Priority:  Maintenance   Target end date:  Indefinite    Indications    Status post mitral valve replacement [Z95.2]  Warfarin anticoagulation [Z79.01]             Anticoagulation Episode Summary     INR check location:      Preferred lab:      Send INR reminders to:   [facility]    Comments:        Anticoagulation Care Providers     Provider Role Specialty Phone number    Lindsey Vargas MD Referring Cardiology [phone]          Clinic Interview:      INR History:  Anticoagulation Monitoring 2021   INR 3.0 3.9 2.9   INR Date 2021   INR Goal 2.5-3.5 2.5-3.5 2.5-3.5   Trend Same Same Same   Last Week Total 47.5 mg 47.5 mg 47.5 mg   Next Week Total 47.5 mg 45 mg 47.5 mg   Sun 5 mg 5 mg 5 mg   Mon 7.5 mg 7.5 mg 7.5 mg   Tue 7.5 mg 7.5 mg 7.5 mg   Wed 7.5 mg 7.5 mg 7.5 mg   Thu 5 mg 5 mg 5 mg   Fri 7.5 mg 5 mg (); Otherwise 7.5 mg 7.5 mg   Sat 7.5 mg 7.5 mg 7.5 mg   Visit Report - - -   Some recent data might be hidden       Plan:  1. INR is therapeutic today- see above in Anticoagulation Summary.   Will instruct Yana Lehman to continue their warfarin regimen- see above in Anticoagulation Summary.  2. Follow up in 3 weeks.  3. Patient declines warfarin refills.  4. Verbal and written information provided. Patient expresses understanding and has no further questions at this time.    Rita Bobby

## 2021-07-09 ENCOUNTER — ANTICOAGULATION VISIT (OUTPATIENT)
Dept: PHARMACY | Facility: HOSPITAL | Age: 66
End: 2021-07-09

## 2021-07-09 DIAGNOSIS — Z95.2 STATUS POST MITRAL VALVE REPLACEMENT: Primary | ICD-10-CM

## 2021-07-09 DIAGNOSIS — Z79.01 WARFARIN ANTICOAGULATION: ICD-10-CM

## 2021-07-09 LAB
INR PPP: 2.7 (ref 0.91–1.09)
PROTHROMBIN TIME: 32.3 SECONDS (ref 10–13.8)

## 2021-07-09 PROCEDURE — 36416 COLLJ CAPILLARY BLOOD SPEC: CPT

## 2021-07-09 PROCEDURE — 85610 PROTHROMBIN TIME: CPT

## 2021-07-09 NOTE — PROGRESS NOTES
Anticoagulation Clinic Progress Note    Anticoagulation Summary  As of 2021    INR goal:  2.5-3.5   TTR:  79.3 % (2.8 y)   INR used for dosin.7 (2021)   Warfarin maintenance plan:  5 mg every Sun, Thu; 7.5 mg all other days   Weekly warfarin total:  47.5 mg   No change documented:  Josie Hayward [facility]   Plan last modified:  Renee Guy RPH (3/12/2021)   Next INR check:  2021   Priority:  Maintenance   Target end date:  Indefinite    Indications    Status post mitral valve replacement [Z95.2]  Warfarin anticoagulation [Z79.01]             Anticoagulation Episode Summary     INR check location:      Preferred lab:      Send INR reminders to:   [facility]    Comments:        Anticoagulation Care Providers     Provider Role Specialty Phone number    Lindsey Vargas MD Referring Cardiology [phone]          Clinic Interview:  Patient Findings     Negatives:  Signs/symptoms of thrombosis, Signs/symptoms of bleeding,   Laboratory test error suspected, Change in health, Change in alcohol use,   Change in activity, Upcoming invasive procedure, Emergency department   visit, Upcoming dental procedure, Missed doses, Extra doses, Change in   medications, Change in diet/appetite, Hospital admission, Bruising, Other   complaints      Clinical Outcomes     Negatives:  Major bleeding event, Thromboembolic event,   Anticoagulation-related hospital admission, Anticoagulation-related ED   visit, Anticoagulation-related fatality        INR History:  Anticoagulation Monitoring 2021   INR 3.9 2.9 2.7   INR Date 2021   INR Goal 2.5-3.5 2.5-3.5 2.5-3.5   Trend Same Same Same   Last Week Total 47.5 mg 47.5 mg 47.5 mg   Next Week Total 45 mg 47.5 mg 47.5 mg   Sun 5 mg 5 mg 5 mg   Mon 7.5 mg 7.5 mg 7.5 mg   Tue 7.5 mg 7.5 mg 7.5 mg   Wed 7.5 mg 7.5 mg 7.5 mg   Thu 5 mg 5 mg 5 mg   Fri 5 mg (); Otherwise 7.5 mg 7.5 mg 7.5 mg   Sat 7.5 mg 7.5 mg  7.5 mg   Visit Report - - -   Some recent data might be hidden       Plan:  1. INR is Therapeutic today- see above in Anticoagulation Summary.  Will instruct Yana Lehman to Continue their warfarin regimen- see above in Anticoagulation Summary.  2. Follow up in 6 weeks  3. Patient declines warfarin refills.  4. Verbal and written information provided. Patient expresses understanding and has no further questions at this time.    Josie Hayward, [facility]

## 2021-08-20 ENCOUNTER — ANTICOAGULATION VISIT (OUTPATIENT)
Dept: PHARMACY | Facility: HOSPITAL | Age: 66
End: 2021-08-20

## 2021-08-20 DIAGNOSIS — Z95.2 STATUS POST MITRAL VALVE REPLACEMENT: Primary | ICD-10-CM

## 2021-08-20 DIAGNOSIS — Z79.01 WARFARIN ANTICOAGULATION: ICD-10-CM

## 2021-08-20 LAB
INR PPP: 3 (ref 0.91–1.09)
PROTHROMBIN TIME: 35.9 SECONDS (ref 10–13.8)

## 2021-08-20 PROCEDURE — 36416 COLLJ CAPILLARY BLOOD SPEC: CPT

## 2021-08-20 PROCEDURE — 85610 PROTHROMBIN TIME: CPT

## 2021-08-20 NOTE — PROGRESS NOTES
Anticoagulation Clinic Progress Note    Anticoagulation Summary  As of 8/20/2021    INR goal:  2.5-3.5   TTR:  80.1 % (2.9 y)   INR used for dosing:  3.0 (8/20/2021)   Warfarin maintenance plan:  5 mg every Sun, Thu; 7.5 mg all other days   Weekly warfarin total:  47.5 mg   No change documented:  Emely Greene   Plan last modified:  Renee Guy RPH (3/12/2021)   Next INR check:  9/17/2021   Priority:  Maintenance   Target end date:  Indefinite    Indications    Status post mitral valve replacement [Z95.2]  Warfarin anticoagulation [Z79.01]             Anticoagulation Episode Summary     INR check location:      Preferred lab:      Send INR reminders to:  [facility]    Comments:        Anticoagulation Care Providers     Provider Role Specialty Phone number    Lindsey Vargas MD Referring Cardiology [phone]          Clinic Interview:  Patient Findings     Negatives:  Signs/symptoms of thrombosis, Signs/symptoms of bleeding,   Laboratory test error suspected, Change in health, Change in alcohol use,   Change in activity, Upcoming invasive procedure, Emergency department   visit, Upcoming dental procedure, Missed doses, Extra doses, Change in   medications, Change in diet/appetite, Hospital admission, Bruising, Other   complaints      Clinical Outcomes     Negatives:  Major bleeding event, Thromboembolic event,   Anticoagulation-related hospital admission, Anticoagulation-related ED   visit, Anticoagulation-related fatality        INR History:  Anticoagulation Monitoring 6/18/2021 7/9/2021 8/20/2021   INR 2.9 2.7 3.0   INR Date 6/18/2021 7/9/2021 8/20/2021   INR Goal 2.5-3.5 2.5-3.5 2.5-3.5   Trend Same Same Same   Last Week Total 47.5 mg 47.5 mg 47.5 mg   Next Week Total 47.5 mg 47.5 mg 47.5 mg   Sun 5 mg 5 mg 5 mg   Mon 7.5 mg 7.5 mg 7.5 mg   Tue 7.5 mg 7.5 mg 7.5 mg   Wed 7.5 mg 7.5 mg 7.5 mg   Thu 5 mg 5 mg 5 mg   Fri 7.5 mg 7.5 mg 7.5 mg   Sat 7.5 mg 7.5 mg 7.5 mg   Visit  Report - - -   Some recent data might be hidden       Plan:  1. INR is therapeutic today- see above in Anticoagulation Summary.   Will instruct Yana Lehman to continue their warfarin regimen- see above in Anticoagulation Summary.  2. Follow up in 4 weeks.  3. Patient declines warfarin refills.  4. Verbal and written information provided. Patient expresses understanding and has no further questions at this time.    Emely Greene

## 2021-09-16 ENCOUNTER — ANTICOAGULATION VISIT (OUTPATIENT)
Dept: PHARMACY | Facility: HOSPITAL | Age: 66
End: 2021-09-16

## 2021-09-16 DIAGNOSIS — Z95.2 STATUS POST MITRAL VALVE REPLACEMENT: Primary | ICD-10-CM

## 2021-09-16 DIAGNOSIS — Z79.01 WARFARIN ANTICOAGULATION: ICD-10-CM

## 2021-09-16 LAB
INR PPP: 2.5 (ref 0.91–1.09)
PROTHROMBIN TIME: 29.5 SECONDS (ref 10–13.8)

## 2021-09-16 PROCEDURE — 36416 COLLJ CAPILLARY BLOOD SPEC: CPT

## 2021-09-16 PROCEDURE — 85610 PROTHROMBIN TIME: CPT

## 2021-09-16 NOTE — PROGRESS NOTES
Anticoagulation Clinic Progress Note    Anticoagulation Summary  As of 2021    INR goal:  2.5-3.5   TTR:  80.6 % (3 y)   INR used for dosin.5 (2021)   Warfarin maintenance plan:  5 mg every Sun, Thu; 7.5 mg all other days   Weekly warfarin total:  47.5 mg   No change documented:  Rita Bobby   Plan last modified:  Renee Guy RPH (3/12/2021)   Next INR check:  10/15/2021   Priority:  Maintenance   Target end date:  Indefinite    Indications    Status post mitral valve replacement [Z95.2]  Warfarin anticoagulation [Z79.01]             Anticoagulation Episode Summary     INR check location:      Preferred lab:      Send INR reminders to:  [facility]    Comments:        Anticoagulation Care Providers     Provider Role Specialty Phone number    Lindsey Vargas MD Referring Cardiology [phone]          Clinic Interview:  Patient Findings     Negatives:  Signs/symptoms of thrombosis, Signs/symptoms of bleeding,   Laboratory test error suspected, Change in health, Change in alcohol use,   Change in activity, Upcoming invasive procedure, Emergency department   visit, Upcoming dental procedure, Missed doses, Extra doses, Change in   medications, Change in diet/appetite, Hospital admission, Bruising, Other   complaints      Clinical Outcomes     Negatives:  Major bleeding event, Thromboembolic event,   Anticoagulation-related hospital admission, Anticoagulation-related ED   visit, Anticoagulation-related fatality        INR History:  Anticoagulation Monitoring 2021   INR 2.7 3.0 2.5   INR Date 2021   INR Goal 2.5-3.5 2.5-3.5 2.5-3.5   Trend Same Same Same   Last Week Total 47.5 mg 47.5 mg 47.5 mg   Next Week Total 47.5 mg 47.5 mg 47.5 mg   Sun 5 mg 5 mg 5 mg   Mon 7.5 mg 7.5 mg 7.5 mg   Tue 7.5 mg 7.5 mg 7.5 mg   Wed 7.5 mg 7.5 mg 7.5 mg   Thu 5 mg 5 mg 5 mg   Fri 7.5 mg 7.5 mg 7.5 mg   Sat 7.5 mg 7.5 mg 7.5 mg   Visit Report -  - -   Some recent data might be hidden       Plan:  1. INR is therapeutic today- see above in Anticoagulation Summary.   Will instruct Yana Lehman to continue their warfarin regimen- see above in Anticoagulation Summary.  2. Follow up in 4 weeks.  3. Patient declines warfarin refills.  4. Verbal and written information provided. Patient expresses understanding and has no further questions at this time.    Rita Bobby

## 2021-09-17 ENCOUNTER — APPOINTMENT (OUTPATIENT)
Dept: PHARMACY | Facility: HOSPITAL | Age: 66
End: 2021-09-17

## 2021-10-13 ENCOUNTER — ANTICOAGULATION VISIT (OUTPATIENT)
Dept: PHARMACY | Facility: HOSPITAL | Age: 66
End: 2021-10-13

## 2021-10-13 DIAGNOSIS — Z95.2 STATUS POST MITRAL VALVE REPLACEMENT: Primary | ICD-10-CM

## 2021-10-13 DIAGNOSIS — Z79.01 WARFARIN ANTICOAGULATION: ICD-10-CM

## 2021-10-13 LAB
INR PPP: 2.3 (ref 0.91–1.09)
PROTHROMBIN TIME: 28.2 SECONDS (ref 10–13.8)

## 2021-10-13 PROCEDURE — 36416 COLLJ CAPILLARY BLOOD SPEC: CPT

## 2021-10-13 PROCEDURE — G0463 HOSPITAL OUTPT CLINIC VISIT: HCPCS

## 2021-10-13 PROCEDURE — 85610 PROTHROMBIN TIME: CPT

## 2021-10-13 NOTE — PROGRESS NOTES
I have supervised and reviewed the notes, assessments, and/or procedures performed by our Pharmacy Resident. The documented assessment and plan were developed cooperatively. I concur with the documentation of this patient encounter.    Grady Salazar, PharmD

## 2021-10-13 NOTE — PROGRESS NOTES
Anticoagulation Clinic Progress Note    Anticoagulation Summary  As of 10/13/2021    INR goal:  2.5-3.5   TTR:  78.7 % (3 y)   INR used for dosin.3 (10/13/2021)   Warfarin maintenance plan:  5 mg every Sun, Thu; 7.5 mg all other days   Weekly warfarin total:  47.5 mg   Plan last modified:  Renee Guy RPH (3/12/2021)   Next INR check:  10/27/2021   Priority:  Maintenance   Target end date:  Indefinite    Indications    Status post mitral valve replacement [Z95.2]  Warfarin anticoagulation [Z79.01]             Anticoagulation Episode Summary     INR check location:      Preferred lab:      Send INR reminders to:   [facility]    Comments:        Anticoagulation Care Providers     Provider Role Specialty Phone number    Lindsey Vargas MD Referring Cardiology [phone]          Clinic Interview:  Patient Findings     Negatives:  Signs/symptoms of thrombosis, Signs/symptoms of bleeding,   Laboratory test error suspected, Change in health, Change in alcohol use,   Change in activity, Upcoming invasive procedure, Emergency department   visit, Upcoming dental procedure, Missed doses, Extra doses, Change in   medications, Change in diet/appetite, Hospital admission, Bruising, Other   complaints      Clinical Outcomes     Negatives:  Major bleeding event, Thromboembolic event,   Anticoagulation-related hospital admission, Anticoagulation-related ED   visit, Anticoagulation-related fatality        INR History:  Anticoagulation Monitoring 2021 2021 10/13/2021   INR 3.0 2.5 2.3   INR Date 2021 2021 10/13/2021   INR Goal 2.5-3.5 2.5-3.5 2.5-3.5   Trend Same Same Same   Last Week Total 47.5 mg 47.5 mg 47.5 mg   Next Week Total 47.5 mg 47.5 mg 50 mg   Sun 5 mg 5 mg 5 mg   Mon 7.5 mg 7.5 mg 7.5 mg   Tue 7.5 mg 7.5 mg 7.5 mg   Wed 7.5 mg 7.5 mg 7.5 mg   Thu 5 mg 5 mg 7.5 mg (10/14); Otherwise 5 mg   Fri 7.5 mg 7.5 mg 7.5 mg   Sat 7.5 mg 7.5 mg 7.5 mg   Visit Report - - -   Some  recent data might be hidden       Plan:  1. INR is Subtherapeutic today- see above in Anticoagulation Summary.  Will instruct Yana Lehman to Change their warfarin regimen by adding a boost tomorrow of 7.5 mg on 10/14 then resuming normal dosing and f/u in 2 weeks- see above in Anticoagulation Summary.  2. Follow up in 2 weeks  3. Patient declines warfarin refills.  4. Verbal and written information provided. Patient expresses understanding and has no further questions at this time.    Brandon Vasquez, PharmD  Pharmacy Resident

## 2021-10-15 ENCOUNTER — APPOINTMENT (OUTPATIENT)
Dept: PHARMACY | Facility: HOSPITAL | Age: 66
End: 2021-10-15

## 2021-10-27 ENCOUNTER — ANTICOAGULATION VISIT (OUTPATIENT)
Dept: PHARMACY | Facility: HOSPITAL | Age: 66
End: 2021-10-27

## 2021-10-27 DIAGNOSIS — Z79.01 WARFARIN ANTICOAGULATION: ICD-10-CM

## 2021-10-27 DIAGNOSIS — Z95.2 STATUS POST MITRAL VALVE REPLACEMENT: Primary | ICD-10-CM

## 2021-10-27 LAB
INR PPP: 3.2 (ref 0.91–1.09)
PROTHROMBIN TIME: 38.4 SECONDS (ref 10–13.8)

## 2021-10-27 PROCEDURE — 85610 PROTHROMBIN TIME: CPT

## 2021-10-27 PROCEDURE — 36416 COLLJ CAPILLARY BLOOD SPEC: CPT

## 2021-10-27 NOTE — PROGRESS NOTES
Anticoagulation Clinic Progress Note    Anticoagulation Summary  As of 10/27/2021    INR goal:  2.5-3.5   TTR:  78.6 % (3.1 y)   INR used for dosing:  3.2 (10/27/2021)   Warfarin maintenance plan:  5 mg every Sun, Thu; 7.5 mg all other days   Weekly warfarin total:  47.5 mg   No change documented:  Brandon Vasquez [facility]   Plan last modified:  Renee Guy RPH (3/12/2021)   Next INR check:  11/17/2021   Priority:  Maintenance   Target end date:  Indefinite    Indications    Status post mitral valve replacement [Z95.2]  Warfarin anticoagulation [Z79.01]             Anticoagulation Episode Summary     INR check location:      Preferred lab:      Send INR reminders to:   [facility]    Comments:        Anticoagulation Care Providers     Provider Role Specialty Phone number    Lindsey Vargas MD Referring Cardiology [phone]          Clinic Interview:  Patient Findings     Positives:  Change in alcohol use    Negatives:  Signs/symptoms of thrombosis, Signs/symptoms of bleeding,   Laboratory test error suspected, Change in health, Change in activity,   Upcoming invasive procedure, Emergency department visit, Upcoming dental   procedure, Missed doses, Extra doses, Change in medications, Change in   diet/appetite, Hospital admission, Bruising, Other complaints     Comments:  Patient reports she has been eating veggies more consistently      Clinical Outcomes     Negatives:  Major bleeding event, Thromboembolic event,   Anticoagulation-related hospital admission, Anticoagulation-related ED   visit, Anticoagulation-related fatality     INR History:  Anticoagulation Monitoring 9/16/2021 10/13/2021 10/27/2021   INR 2.5 2.3 3.2   INR Date 9/16/2021 10/13/2021 10/27/2021   INR Goal 2.5-3.5 2.5-3.5 2.5-3.5   Trend Same Same Same   Last Week Total 47.5 mg 47.5 mg 47.5 mg   Next Week Total 47.5 mg 50 mg 47.5 mg   Sun 5 mg 5 mg 5 mg   Mon 7.5 mg 7.5 mg 7.5 mg   Tue 7.5 mg 7.5 mg 7.5 mg   Wed 7.5 mg 7.5 mg  7.5 mg   Thu 5 mg 7.5 mg (10/14); Otherwise 5 mg 5 mg   Fri 7.5 mg 7.5 mg 7.5 mg   Sat 7.5 mg 7.5 mg 7.5 mg   Visit Report - - -   Some recent data might be hidden       Plan:  1. INR is Therapeutic today- see above in Anticoagulation Summary.  Will instruct Yana Lehman to Continue their warfarin regimen- see above in Anticoagulation Summary.  2. Follow up in 3 weeks  3. Patient declines warfarin refills.  4. Verbal and written information provided. Patient expresses understanding and has no further questions at this time.    Brandon Vasquez, PharmD  Pharmacy Resident

## 2021-11-17 ENCOUNTER — APPOINTMENT (OUTPATIENT)
Dept: PHARMACY | Facility: HOSPITAL | Age: 66
End: 2021-11-17

## 2021-11-18 ENCOUNTER — APPOINTMENT (OUTPATIENT)
Dept: PHARMACY | Facility: HOSPITAL | Age: 66
End: 2021-11-18

## 2021-11-22 ENCOUNTER — TELEPHONE (OUTPATIENT)
Dept: PHARMACY | Facility: HOSPITAL | Age: 66
End: 2021-11-22

## 2021-11-24 ENCOUNTER — ANTICOAGULATION VISIT (OUTPATIENT)
Dept: PHARMACY | Facility: HOSPITAL | Age: 66
End: 2021-11-24

## 2021-11-24 DIAGNOSIS — Z95.2 STATUS POST MITRAL VALVE REPLACEMENT: Primary | ICD-10-CM

## 2021-11-24 DIAGNOSIS — Z79.01 WARFARIN ANTICOAGULATION: ICD-10-CM

## 2021-11-24 LAB
INR PPP: 2.4 (ref 0.91–1.09)
PROTHROMBIN TIME: 28.4 SECONDS (ref 10–13.8)

## 2021-11-24 PROCEDURE — 85610 PROTHROMBIN TIME: CPT

## 2021-11-24 PROCEDURE — 36416 COLLJ CAPILLARY BLOOD SPEC: CPT

## 2021-11-24 NOTE — PROGRESS NOTES
Anticoagulation Clinic Progress Note    Anticoagulation Summary  As of 2021    INR goal:  2.5-3.5   TTR:  78.9 % (3.1 y)   INR used for dosin.4 (2021)   Warfarin maintenance plan:  5 mg every Sun, Thu; 7.5 mg all other days   Weekly warfarin total:  47.5 mg   No change documented:  Nickie Landin RPH   Plan last modified:  Renee Guy RPH (3/12/2021)   Next INR check:  2021   Priority:  Maintenance   Target end date:  Indefinite    Indications    Status post mitral valve replacement [Z95.2]  Warfarin anticoagulation [Z79.01]             Anticoagulation Episode Summary     INR check location:      Preferred lab:      Send INR reminders to:   [facility]    Comments:        Anticoagulation Care Providers     Provider Role Specialty Phone number    Lindsey Vargas MD Referring Cardiology [phone]          Clinic Interview:  Patient Findings     Positives:  Other complaints    Negatives:  Signs/symptoms of thrombosis, Signs/symptoms of bleeding,   Laboratory test error suspected, Change in health, Change in alcohol use,   Change in activity, Upcoming invasive procedure, Emergency department   visit, Upcoming dental procedure, Missed doses, Extra doses, Change in   medications, Change in diet/appetite, Hospital admission, Bruising    Comments:  Patient has a lot of stress right now and has had less   consistency than normal late but she is taking her medicine daily.       Clinical Outcomes     Negatives:  Major bleeding event, Thromboembolic event,   Anticoagulation-related hospital admission, Anticoagulation-related ED   visit, Anticoagulation-related fatality    Comments:  Patient has a lot of stress right now and has had less   consistency than normal late but she is taking her medicine daily.         INR History:  Anticoagulation Monitoring 10/13/2021 10/27/2021 2021   INR 2.3 3.2 2.4   INR Date 10/13/2021 10/27/2021 2021   INR Goal 2.5-3.5 2.5-3.5  2.5-3.5   Trend Same Same Same   Last Week Total 47.5 mg 47.5 mg 47.5 mg   Next Week Total 50 mg 47.5 mg 47.5 mg   Sun 5 mg 5 mg 5 mg   Mon 7.5 mg 7.5 mg 7.5 mg   Tue 7.5 mg 7.5 mg 7.5 mg   Wed 7.5 mg 7.5 mg 7.5 mg   Thu 7.5 mg (10/14); Otherwise 5 mg 5 mg 5 mg   Fri 7.5 mg 7.5 mg 7.5 mg   Sat 7.5 mg 7.5 mg 7.5 mg   Visit Report - - -   Some recent data might be hidden       Plan:  1. INR is slightly Subtherapeutic today- see above in Anticoagulation Summary.  Will instruct Yana Lehman to Continue their warfarin regimen since very close to goal- see above in Anticoagulation Summary.  2. Follow up in 2 weeks  3. Patient declines warfarin refills.  4. Verbal and written information provided. Patient expresses understanding and has no further questions at this time.    Nickie Landin [facility]

## 2021-12-08 ENCOUNTER — OFFICE VISIT (OUTPATIENT)
Dept: CARDIOLOGY | Facility: CLINIC | Age: 66
End: 2021-12-08

## 2021-12-08 ENCOUNTER — ANTICOAGULATION VISIT (OUTPATIENT)
Dept: PHARMACY | Facility: HOSPITAL | Age: 66
End: 2021-12-08

## 2021-12-08 VITALS
WEIGHT: 109.6 LBS | SYSTOLIC BLOOD PRESSURE: 142 MMHG | HEIGHT: 62 IN | DIASTOLIC BLOOD PRESSURE: 70 MMHG | HEART RATE: 81 BPM | BODY MASS INDEX: 20.17 KG/M2

## 2021-12-08 DIAGNOSIS — Z79.01 WARFARIN ANTICOAGULATION: ICD-10-CM

## 2021-12-08 DIAGNOSIS — Z95.2 STATUS POST MITRAL VALVE REPLACEMENT: ICD-10-CM

## 2021-12-08 DIAGNOSIS — Z95.2 STATUS POST MITRAL VALVE REPLACEMENT: Primary | ICD-10-CM

## 2021-12-08 DIAGNOSIS — I48.19 PERSISTENT ATRIAL FIBRILLATION (HCC): ICD-10-CM

## 2021-12-08 DIAGNOSIS — I42.0 DILATED CARDIOMYOPATHY (HCC): Primary | ICD-10-CM

## 2021-12-08 LAB
INR PPP: 3 (ref 0.91–1.09)
PROTHROMBIN TIME: 35.4 SECONDS (ref 10–13.8)

## 2021-12-08 PROCEDURE — 36416 COLLJ CAPILLARY BLOOD SPEC: CPT

## 2021-12-08 PROCEDURE — 99214 OFFICE O/P EST MOD 30 MIN: CPT | Performed by: INTERNAL MEDICINE

## 2021-12-08 PROCEDURE — 85610 PROTHROMBIN TIME: CPT

## 2021-12-08 PROCEDURE — 93000 ELECTROCARDIOGRAM COMPLETE: CPT | Performed by: INTERNAL MEDICINE

## 2021-12-08 RX ORDER — LOSARTAN POTASSIUM 100 MG/1
100 TABLET ORAL NIGHTLY
Qty: 90 TABLET | Refills: 3 | Status: SHIPPED | OUTPATIENT
Start: 2021-12-08 | End: 2022-01-03 | Stop reason: SDUPTHER

## 2021-12-08 NOTE — PROGRESS NOTES
Date of Office Visit: 2021  Encounter Provider: Lindsey Vargas MD  Place of Service: [facility] CARDIOLOGY  Patient Name: Yana Lehman  :1955      Patient ID:  Yana Lehman is a 66 y.o. female is here for  followup for         History of Present Illness    She came in through the emergency department on  06/10/2015 with atrial fibrillation and rapid ventricular response.  At that time, she was  tachycardic and hypotensive.  Her cardiac output was very poor.  Her hands and feet were  cool.  Her skin was overall kind of dusky and almost jaundice in appearance.  She was from  a cardiovascular standpoint very unstable.  A stat echocardiogram was ordered and she was  started on fluid boluses.  Her echocardiogram showed an ejection fraction of 50% to 55%  with severe left atrial dilation, severe right ventricular dilation, severe reduction of  right ventricular systolic function, severe right atrial dilation, trace to mild aortic  insufficiency, severely thickened mitral leaflets with grade 3 mobility of the leaflets  and severe mitral stenosis.  The mean gradient across the valve was 14 mmHg.  Her right  ventricular systolic pressure was 94 mmHg and there was moderate tricuspid insufficiency.   An emergent KITTY was done which showed severe left atrial dilation, severely dilated right  ventricle with severe reduction of right ventricular systolic function, severe mitral  stenosis, mild to moderate mitral insufficiency, and moderate tricuspid insufficiency.   She had a cardiac catheterization done on 06/10/2015 showing normal coronary arteries,  moderate to severe pulmonary hypertension, mildly depressed cardiac output, and severely  elevated peripheral vascular resistance.  Her PA pressure was a mean of 55 mmHg.  The RV  pressure was 63/3.  The right atrial pressure was 25.  Dr. Márquez saw her immediately and  took her to the operating room where she had emergency  mitral valve replacement with a 31  mm Medtronic mechanical valve.  Preservation of the papillary muscle with two Elberon-Librado  Neochord.       She presented to [facility] on 3/4/18 and was noted to have been noncompliant for several years due to depression in social situations. She stopped many of her medications, but did remain on her warfarin.  She presented with fatigue and palpitations.  She had a 2-D echocardiogram completed which revealed calculated EF of 32% and estimated EF of 21-25%, left ventricular cavity mild to moderately dilated, wall motion abnormalities, left atrium and right ventricle cavity moderately dilated, mechanical mitral valve prosthesis present and grossly normal, moderate tricuspid valve regurgitation, and mild pulmonary hypertension with RVSP of 41 mmHg.  She was noted to have a chads 2 VASC score of 2 and was recommended to start carvedilol, digoxin, furosemide, potassium, and warfarin.  Stress nuclear Study done 9/5/2018 showed no evidence of ischemia, mild septal hypokinesis but normal ejection fraction.     She had an echocardiogram done 5/20/2019 showing RVSP 38, ejection fraction 46 - 50%, normal-appearing 31 mm Medtronic bileaflet mechanical valve, mean gradient 4, peak gradient 12.       Labs on 3/11/2021 show normal vitamin D, triglycerides 113, glucose 26, HDL 50, , VLDL 23, normal CBC and normal CMP, normal TSH, normal hemoglobin A1c.    She has no exertional chest pain or pressure.  She does not feel her heart racing or skipping.  She has had no dizziness or falls.  She is under a lot of stress and so her blood pressure is elevated.  She is taking care of her sister who is dying with COPD.  Overall, if it was not for that, she would be feeling really good.    Past Medical History:   Diagnosis Date   • Acute bronchitis    • Acute kidney injury (HCC)     after surgery   • Acute systolic congestive heart failure (HCC) 3/5/2018   • Cachexia (HCC)    • Cardiomyopathy (HCC)      unspecified type   • Depression    • H/O shortness of breath    • Mitral valve stenosis     severe; s/p mitral valve replacement    • Persistent atrial fibrillation (HCC)     on Toprol and warfarin   • Pneumothorax    • Pulmonary hypertension (HCC)    • Rheumatic fever    • Sinus tachycardia    • Tricuspid valve insufficiency 3/15/2018   • Warfarin anticoagulation 03/15/2018    followed by [facility] INR clinic         Past Surgical History:   Procedure Laterality Date   • CARDIAC CATHETERIZATION      procedure outcome: successful   • FOOT SURGERY     • MITRAL VALVE REPLACEMENT  06/2015    Subhash Márquez   • MITRAL VALVE REPLACEMENT     • TONSILLECTOMY         Current Outpatient Medications on File Prior to Visit   Medication Sig Dispense Refill   • Biotin 26258 MCG tablet Take 1 tablet by mouth Daily.     • Cholecalciferol (VITAMIN D-3) 125 MCG (5000 UT) tablet Take 1 tablet by mouth Daily.     • digoxin (Digox) 125 MCG tablet Take 1 tablet by mouth Daily. 90 tablet 3   • losartan (COZAAR) 50 MG tablet Take 1 tablet by mouth Every Night. 90 tablet 3   • metoprolol succinate XL (TOPROL-XL) 25 MG 24 hr tablet Take 1 tablet by mouth 2 (Two) Times a Day. 180 tablet 3   • simvastatin (ZOCOR) 10 MG tablet Take 10 mg by mouth Daily.     • warfarin (COUMADIN) 5 MG tablet Take one tablet (5 mg) by mouth on Sun and Thurs and take one and one-half tablets (7.5 mg) by mouth all other days or as directed. 130 tablet 1     No current facility-administered medications on file prior to visit.       Social History     Socioeconomic History   • Marital status: Single   Tobacco Use   • Smoking status: Former Smoker     Quit date: 2/18/2018     Years since quitting: 3.8   • Smokeless tobacco: Never Used   Substance and Sexual Activity   • Alcohol use: No     Comment: caffeine use: half and half 3 cups daily   • Drug use: No           ROS    Procedures    ECG 12 Lead    Date/Time: 12/8/2021 9:27 AM  Performed by: Lindsey Vargas  "MD  Authorized by: Lindsey Vargas MD   Comparison: compared with previous ECG   Similar to previous ECG  Rhythm: atrial fibrillation    Clinical impression: abnormal EKG                Objective:      Vitals:    12/08/21 0921 12/08/21 0922   BP: 140/70 142/70   BP Location: Left arm Right arm   Pulse: 81    Weight: 49.7 kg (109 lb 9.6 oz)    Height: 157.5 cm (62\")      Body mass index is 20.05 kg/m².    Vitals reviewed.   Constitutional:       General: Not in acute distress.     Appearance: Well-developed. Not diaphoretic.   Eyes:      General: No scleral icterus.     Conjunctiva/sclera: Conjunctivae normal.   HENT:      Head: Normocephalic and atraumatic.   Neck:      Thyroid: No thyromegaly.      Vascular: No carotid bruit or JVD.      Lymphadenopathy: No cervical adenopathy.   Pulmonary:      Effort: Pulmonary effort is normal. No respiratory distress.      Breath sounds: Normal breath sounds. No wheezing. No rhonchi. No rales.   Chest:      Chest wall: Not tender to palpatation.   Cardiovascular:      Normal rate. Irregularly irregular rhythm.      Murmurs: There is no murmur.      No gallop.      Comments: mech click  Pulses:     Intact distal pulses.   Edema:     Peripheral edema absent.   Abdominal:      General: Bowel sounds are normal. There is no distension or abdominal bruit.      Palpations: Abdomen is soft. There is no abdominal mass.      Tenderness: There is no abdominal tenderness.   Musculoskeletal:         General: No deformity.      Extremities: No clubbing present.     Cervical back: Neck supple. Skin:     General: Skin is warm and dry. There is no cyanosis.      Coloration: Skin is not pale.      Findings: No rash.   Neurological:      Mental Status: Alert and oriented to person, place, and time.      Cranial Nerves: No cranial nerve deficit.   Psychiatric:         Judgment: Judgment normal.         Lab Review:       Assessment:      Diagnosis Plan   1. Dilated cardiomyopathy (HCC)     2. " Persistent atrial fibrillation (HCC)     3. Status post mitral valve replacement  Adult Transthoracic Echo Complete W/ Cont if Necessary Per Protocol     1. Cardiomyopathy - mild. EF 46-50%. No CHF.   2. Atrial fibrillation - on warfarin, heart rate better on metoprolol and digoxin.   3. S/p MV replacement, mechanical. Functioning well.   4. Pulmonary HTN, resolved.   5. Depression  6. Hypertension, goal <120/80.   Blood pressure is high today due to stress.  Will increase losartan 100 mg nightly.          Plan:       See back in 1 year with an echocardiogram the same day to reevaluate mechanical mitral valve.  Medication changes as noted above.

## 2021-12-08 NOTE — PROGRESS NOTES
Anticoagulation Clinic Progress Note    Anticoagulation Summary  As of 12/8/2021    INR goal:  2.5-3.5   TTR:  78.9 % (3.2 y)   INR used for dosing:  3.0 (12/8/2021)   Warfarin maintenance plan:  5 mg every Sun, Thu; 7.5 mg all other days   Weekly warfarin total:  47.5 mg   No change documented:  Rita Bobby   Plan last modified:  Renee Guy RPH (3/12/2021)   Next INR check:  12/29/2021   Priority:  Maintenance   Target end date:  Indefinite    Indications    Status post mitral valve replacement [Z95.2]  Warfarin anticoagulation [Z79.01]             Anticoagulation Episode Summary     INR check location:      Preferred lab:      Send INR reminders to:  [facility]    Comments:        Anticoagulation Care Providers     Provider Role Specialty Phone number    Lindsey Vargas MD Referring Cardiology [phone]          Clinic Interview:  Patient Findings     Negatives:  Signs/symptoms of thrombosis, Signs/symptoms of bleeding,   Laboratory test error suspected, Change in health, Change in alcohol use,   Change in activity, Upcoming invasive procedure, Emergency department   visit, Upcoming dental procedure, Missed doses, Extra doses, Change in   medications, Change in diet/appetite, Hospital admission, Bruising, Other   complaints      Clinical Outcomes     Negatives:  Major bleeding event, Thromboembolic event,   Anticoagulation-related hospital admission, Anticoagulation-related ED   visit, Anticoagulation-related fatality        INR History:  Anticoagulation Monitoring 10/27/2021 11/24/2021 12/8/2021   INR 3.2 2.4 3.0   INR Date 10/27/2021 11/24/2021 12/8/2021   INR Goal 2.5-3.5 2.5-3.5 2.5-3.5   Trend Same Same Same   Last Week Total 47.5 mg 47.5 mg 47.5 mg   Next Week Total 47.5 mg 47.5 mg 47.5 mg   Sun 5 mg 5 mg 5 mg   Mon 7.5 mg 7.5 mg 7.5 mg   Tue 7.5 mg 7.5 mg 7.5 mg   Wed 7.5 mg 7.5 mg 7.5 mg   Thu 5 mg 5 mg 5 mg   Fri 7.5 mg 7.5 mg 7.5 mg   Sat 7.5 mg 7.5 mg 7.5 mg   Visit  Report - - -   Some recent data might be hidden       Plan:  1. INR is therapeutic today- see above in Anticoagulation Summary.   Will instruct Yana Lehman to continue their warfarin regimen- see above in Anticoagulation Summary.  2. Follow up in 3 weeks.  3. Patient declines warfarin refills.  4. Verbal and written information provided. Patient expresses understanding and has no further questions at this time.    Rita Bobby

## 2021-12-30 ENCOUNTER — ANTICOAGULATION VISIT (OUTPATIENT)
Dept: PHARMACY | Facility: HOSPITAL | Age: 66
End: 2021-12-30

## 2021-12-30 DIAGNOSIS — Z79.01 WARFARIN ANTICOAGULATION: ICD-10-CM

## 2021-12-30 DIAGNOSIS — Z95.2 STATUS POST MITRAL VALVE REPLACEMENT: Primary | ICD-10-CM

## 2021-12-30 LAB
INR PPP: 2.3 (ref 0.91–1.09)
PROTHROMBIN TIME: 27.7 SECONDS (ref 10–13.8)

## 2021-12-30 PROCEDURE — G0463 HOSPITAL OUTPT CLINIC VISIT: HCPCS

## 2021-12-30 PROCEDURE — 36416 COLLJ CAPILLARY BLOOD SPEC: CPT

## 2021-12-30 PROCEDURE — 85610 PROTHROMBIN TIME: CPT

## 2021-12-30 RX ORDER — WARFARIN SODIUM 5 MG/1
TABLET ORAL
Qty: 130 TABLET | Refills: 1 | Status: SHIPPED | OUTPATIENT
Start: 2021-12-30 | End: 2022-03-28 | Stop reason: SDUPTHER

## 2021-12-30 NOTE — PROGRESS NOTES
Anticoagulation Clinic Progress Note    Anticoagulation Summary  As of 2021    INR goal:  2.5-3.5   TTR:  78.8 % (3.2 y)   INR used for dosin.3 (2021)   Warfarin maintenance plan:  5 mg every Sun, Thu; 7.5 mg all other days   Weekly warfarin total:  47.5 mg   Plan last modified:  Renee Guy RPH (3/12/2021)   Next INR check:  2022   Priority:  Maintenance   Target end date:  Indefinite    Indications    Status post mitral valve replacement [Z95.2]  Warfarin anticoagulation [Z79.01]             Anticoagulation Episode Summary     INR check location:      Preferred lab:      Send INR reminders to:   [facility]    Comments:        Anticoagulation Care Providers     Provider Role Specialty Phone number    Lindsey Vargas MD Referring Cardiology [phone]          Clinic Interview:  Patient Findings     Negatives:  Signs/symptoms of thrombosis, Signs/symptoms of bleeding,   Laboratory test error suspected, Change in health, Change in alcohol use,   Change in activity, Upcoming invasive procedure, Emergency department   visit, Upcoming dental procedure, Missed doses, Extra doses, Change in   medications, Change in diet/appetite, Hospital admission, Bruising, Other   complaints      Clinical Outcomes     Negatives:  Major bleeding event, Thromboembolic event,   Anticoagulation-related hospital admission, Anticoagulation-related ED   visit, Anticoagulation-related fatality        INR History:  Anticoagulation Monitoring 2021   INR 2.4 3.0 2.3   INR Date 2021   INR Goal 2.5-3.5 2.5-3.5 2.5-3.5   Trend Same Same Same   Last Week Total 47.5 mg 47.5 mg 47.5 mg   Next Week Total 47.5 mg 47.5 mg 50 mg   Sun 5 mg 5 mg 5 mg   Mon 7.5 mg 7.5 mg 7.5 mg   Tue 7.5 mg 7.5 mg 7.5 mg   Wed 7.5 mg 7.5 mg 7.5 mg   Thu 5 mg 5 mg 7.5 mg (); Otherwise 5 mg   Fri 7.5 mg 7.5 mg 7.5 mg   Sat 7.5 mg 7.5 mg 7.5 mg   Visit Report - - -    Some recent data might be hidden       Plan:  1. INR is Subtherapeutic today- see above in Anticoagulation Summary.  Will instruct Yana Lehman to Increase their warfarin regimen- see above in Anticoagulation Summary.  2. Follow up in 2.5 weeks  3. Patient declines warfarin refills.  4. Verbal and written information provided. Patient expresses understanding and has no further questions at this time.    Nickie Landin [facility]

## 2022-01-03 RX ORDER — METOPROLOL SUCCINATE 25 MG/1
TABLET, EXTENDED RELEASE ORAL
Qty: 180 TABLET | Refills: 3 | Status: SHIPPED | OUTPATIENT
Start: 2022-01-03 | End: 2022-01-03 | Stop reason: SDUPTHER

## 2022-01-04 RX ORDER — DIGOXIN 125 MCG
TABLET ORAL
Qty: 90 TABLET | Refills: 3 | Status: SHIPPED | OUTPATIENT
Start: 2022-01-04 | End: 2022-12-14 | Stop reason: SDUPTHER

## 2022-01-04 RX ORDER — LOSARTAN POTASSIUM 100 MG/1
100 TABLET ORAL NIGHTLY
Qty: 90 TABLET | Refills: 3 | Status: SHIPPED | OUTPATIENT
Start: 2022-01-04 | End: 2022-12-14 | Stop reason: SDUPTHER

## 2022-01-04 RX ORDER — METOPROLOL SUCCINATE 25 MG/1
25 TABLET, EXTENDED RELEASE ORAL 2 TIMES DAILY
Qty: 180 TABLET | Refills: 3 | Status: SHIPPED | OUTPATIENT
Start: 2022-01-04 | End: 2022-12-14 | Stop reason: SDUPTHER

## 2022-01-18 ENCOUNTER — ANTICOAGULATION VISIT (OUTPATIENT)
Dept: PHARMACY | Facility: HOSPITAL | Age: 67
End: 2022-01-18

## 2022-01-18 ENCOUNTER — APPOINTMENT (OUTPATIENT)
Dept: PHARMACY | Facility: HOSPITAL | Age: 67
End: 2022-01-18

## 2022-01-18 DIAGNOSIS — Z95.2 STATUS POST MITRAL VALVE REPLACEMENT: Primary | ICD-10-CM

## 2022-01-18 DIAGNOSIS — Z79.01 WARFARIN ANTICOAGULATION: ICD-10-CM

## 2022-01-18 LAB
INR PPP: 2.8 (ref 0.91–1.09)
PROTHROMBIN TIME: 33.1 SECONDS (ref 10–13.8)

## 2022-01-18 PROCEDURE — 36416 COLLJ CAPILLARY BLOOD SPEC: CPT

## 2022-01-18 PROCEDURE — 85610 PROTHROMBIN TIME: CPT

## 2022-01-18 NOTE — PROGRESS NOTES
Anticoagulation Clinic Progress Note    Anticoagulation Summary  As of 2022    INR goal:  2.5-3.5   TTR:  78.5 % (3.3 y)   INR used for dosin.8 (2022)   Warfarin maintenance plan:  5 mg every Sun, Thu; 7.5 mg all other days   Weekly warfarin total:  47.5 mg   No change documented:  Grady Salazar, PharmD   Plan last modified:  Renee Guy RPH (3/12/2021)   Next INR check:  2022   Priority:  Maintenance   Target end date:  Indefinite    Indications    Status post mitral valve replacement [Z95.2]  Warfarin anticoagulation [Z79.01]             Anticoagulation Episode Summary     INR check location:      Preferred lab:      Send INR reminders to:   [facility]    Comments:        Anticoagulation Care Providers     Provider Role Specialty Phone number    Lindsey Vargas MD Referring Cardiology [phone]          Clinic Interview:  Patient Findings     Positives:  Change in diet/appetite    Negatives:  Signs/symptoms of thrombosis, Signs/symptoms of bleeding,   Laboratory test error suspected, Change in health, Change in alcohol use,   Change in activity, Upcoming invasive procedure, Emergency department   visit, Upcoming dental procedure, Missed doses, Extra doses, Change in   medications, Hospital admission, Bruising, Other complaints    Comments:  Reports diet has remained abnormal since holidays, but she   plans to get into a better routine beginning this week.       Clinical Outcomes     Negatives:  Major bleeding event, Thromboembolic event,   Anticoagulation-related hospital admission, Anticoagulation-related ED   visit, Anticoagulation-related fatality    Comments:  Reports diet has remained abnormal since holidays, but she   plans to get into a better routine beginning this week.         INR History:  Anticoagulation Monitoring 2021   INR 3.0 2.3 2.8   INR Date 2021   INR Goal 2.5-3.5 2.5-3.5 2.5-3.5   Trend Same Same  Same   Last Week Total 47.5 mg 47.5 mg 47.5 mg   Next Week Total 47.5 mg 50 mg 47.5 mg   Sun 5 mg 5 mg 5 mg   Mon 7.5 mg 7.5 mg 7.5 mg   Tue 7.5 mg 7.5 mg 7.5 mg   Wed 7.5 mg 7.5 mg 7.5 mg   Thu 5 mg 7.5 mg (12/30); Otherwise 5 mg 5 mg   Fri 7.5 mg 7.5 mg 7.5 mg   Sat 7.5 mg 7.5 mg 7.5 mg   Visit Report - - -   Some recent data might be hidden       Plan:  1. INR is Therapeutic today- see above in Anticoagulation Summary.  Will instruct Yana Lehman to Continue their warfarin regimen- see above in Anticoagulation Summary.  2. Follow up in 2 weeks to ensure INR is stable.   3. Patient declines warfarin refills.  4. Verbal and written information provided. Patient expresses understanding and has no further questions at this time.    Grady Salazar, PharmD

## 2022-02-01 ENCOUNTER — ANTICOAGULATION VISIT (OUTPATIENT)
Dept: PHARMACY | Facility: HOSPITAL | Age: 67
End: 2022-02-01

## 2022-02-01 DIAGNOSIS — Z95.2 STATUS POST MITRAL VALVE REPLACEMENT: Primary | ICD-10-CM

## 2022-02-01 DIAGNOSIS — Z79.01 WARFARIN ANTICOAGULATION: ICD-10-CM

## 2022-02-01 LAB
INR PPP: 3.4 (ref 0.91–1.09)
PROTHROMBIN TIME: 40.7 SECONDS (ref 10–13.8)

## 2022-02-01 PROCEDURE — 36416 COLLJ CAPILLARY BLOOD SPEC: CPT

## 2022-02-01 PROCEDURE — 85610 PROTHROMBIN TIME: CPT

## 2022-02-01 NOTE — PROGRESS NOTES
Anticoagulation Clinic Progress Note    Anticoagulation Summary  As of 2/1/2022    INR goal:  2.5-3.5   TTR:  78.7 % (3.3 y)   INR used for dosing:  3.4 (2/1/2022)   Warfarin maintenance plan:  5 mg every Sun, Thu; 7.5 mg all other days   Weekly warfarin total:  47.5 mg   No change documented:  Uyen Dickinson, PharmD   Plan last modified:  Renee Guy RPH (3/12/2021)   Next INR check:  2/22/2022   Priority:  Maintenance   Target end date:  Indefinite    Indications    Status post mitral valve replacement [Z95.2]  Warfarin anticoagulation [Z79.01]             Anticoagulation Episode Summary     INR check location:      Preferred lab:      Send INR reminders to:  [facility]    Comments:        Anticoagulation Care Providers     Provider Role Specialty Phone number    Lindsey Vargas MD Referring Cardiology [phone]          Clinic Interview:  Patient Findings     Negatives:  Signs/symptoms of thrombosis, Signs/symptoms of bleeding,   Laboratory test error suspected, Change in health, Change in alcohol use,   Change in activity, Upcoming invasive procedure, Emergency department   visit, Upcoming dental procedure, Missed doses, Extra doses, Change in   medications, Change in diet/appetite, Hospital admission, Bruising, Other   complaints      Clinical Outcomes     Negatives:  Major bleeding event, Thromboembolic event,   Anticoagulation-related hospital admission, Anticoagulation-related ED   visit, Anticoagulation-related fatality        INR History:  Anticoagulation Monitoring 12/30/2021 1/18/2022 2/1/2022   INR 2.3 2.8 3.4   INR Date 12/30/2021 1/18/2022 2/1/2022   INR Goal 2.5-3.5 2.5-3.5 2.5-3.5   Trend Same Same Same   Last Week Total 47.5 mg 47.5 mg 47.5 mg   Next Week Total 50 mg 47.5 mg 47.5 mg   Sun 5 mg 5 mg 5 mg   Mon 7.5 mg 7.5 mg 7.5 mg   Tue 7.5 mg 7.5 mg 7.5 mg   Wed 7.5 mg 7.5 mg 7.5 mg   Thu 7.5 mg (12/30); Otherwise 5 mg 5 mg 5 mg   Fri 7.5 mg 7.5 mg 7.5 mg   Sat 7.5 mg  7.5 mg 7.5 mg   Visit Report - - -   Some recent data might be hidden       Plan:  1. INR is Therapeutic today- see above in Anticoagulation Summary.  Will instruct Yana Lehman to Continue their warfarin regimen- see above in Anticoagulation Summary.  2. Follow up in 3 weeks  3. Patient declines warfarin refills.  4. Verbal and written information provided. Patient expresses understanding and has no further questions at this time.    Uyen Dickinson, MaximoD

## 2022-02-22 ENCOUNTER — ANTICOAGULATION VISIT (OUTPATIENT)
Dept: PHARMACY | Facility: HOSPITAL | Age: 67
End: 2022-02-22

## 2022-02-22 DIAGNOSIS — Z79.01 WARFARIN ANTICOAGULATION: ICD-10-CM

## 2022-02-22 DIAGNOSIS — Z95.2 STATUS POST MITRAL VALVE REPLACEMENT: Primary | ICD-10-CM

## 2022-02-22 LAB
INR PPP: 2.8 (ref 0.91–1.09)
PROTHROMBIN TIME: 33.5 SECONDS (ref 10–13.8)

## 2022-02-22 PROCEDURE — 85610 PROTHROMBIN TIME: CPT

## 2022-02-22 PROCEDURE — 36416 COLLJ CAPILLARY BLOOD SPEC: CPT

## 2022-02-22 NOTE — PROGRESS NOTES
Anticoagulation Clinic Progress Note    Anticoagulation Summary  As of 2022    INR goal:  2.5-3.5   TTR:  79.1 % (3.4 y)   INR used for dosin.8 (2022)   Warfarin maintenance plan:  5 mg every Sun, Thu; 7.5 mg all other days   Weekly warfarin total:  47.5 mg   Plan last modified:  Renee Guy [facility] (3/12/2021)   Next INR check:  3/22/2022   Priority:  Maintenance   Target end date:  Indefinite    Indications    Status post mitral valve replacement [Z95.2]  Warfarin anticoagulation [Z79.01]             Anticoagulation Episode Summary     INR check location:      Preferred lab:      Send INR reminders to:   [facility] [place]    Comments:        Anticoagulation Care Providers     Provider Role Specialty Phone number    Lindsey Vargas MD Referring Cardiology [phone]          Clinic Interview:      INR History:  Anticoagulation Monitoring 2022   INR 2.8 3.4 2.8   INR Date 2022   INR Goal 2.5-3.5 2.5-3.5 2.5-3.5   Trend Same Same Same   Last Week Total 47.5 mg 47.5 mg 47.5 mg   Next Week Total 47.5 mg 47.5 mg 47.5 mg   Sun 5 mg 5 mg 5 mg   Mon 7.5 mg 7.5 mg 7.5 mg   Tue 7.5 mg 7.5 mg 7.5 mg   Wed 7.5 mg 7.5 mg 7.5 mg   Thu 5 mg 5 mg 5 mg   Fri 7.5 mg 7.5 mg 7.5 mg   Sat 7.5 mg 7.5 mg 7.5 mg   Visit Report - - -   Some recent data might be hidden       Plan:  1. INR is Therapeutic today- see above in Anticoagulation Summary.  Will instruct Yana Lehman to Continue their warfarin regimen- see above in Anticoagulation Summary.  2. Follow up in 1 month  3. Patient declines warfarin refills.  4. Verbal and written information provided. Patient expresses understanding and has no further questions at this time.    Brisa Ya [facility]

## 2022-03-22 ENCOUNTER — APPOINTMENT (OUTPATIENT)
Dept: PHARMACY | Facility: HOSPITAL | Age: 67
End: 2022-03-22

## 2022-03-24 ENCOUNTER — ANTICOAGULATION VISIT (OUTPATIENT)
Dept: PHARMACY | Facility: HOSPITAL | Age: 67
End: 2022-03-24

## 2022-03-24 DIAGNOSIS — Z95.2 STATUS POST MITRAL VALVE REPLACEMENT: Primary | ICD-10-CM

## 2022-03-24 DIAGNOSIS — Z79.01 WARFARIN ANTICOAGULATION: ICD-10-CM

## 2022-03-24 LAB
INR PPP: 3.8 (ref 0.91–1.09)
PROTHROMBIN TIME: 45.7 SECONDS (ref 10–13.8)

## 2022-03-24 PROCEDURE — 85610 PROTHROMBIN TIME: CPT

## 2022-03-24 PROCEDURE — G0463 HOSPITAL OUTPT CLINIC VISIT: HCPCS

## 2022-03-24 PROCEDURE — 36416 COLLJ CAPILLARY BLOOD SPEC: CPT

## 2022-03-24 NOTE — PROGRESS NOTES
Anticoagulation Clinic Progress Note    Anticoagulation Summary  As of 3/24/2022    INR goal:  2.5-3.5   TTR:  78.9 % (3.5 y)   INR used for dosing:  3.8 (3/24/2022)   Warfarin maintenance plan:  5 mg every Sun, Thu; 7.5 mg all other days   Weekly warfarin total:  47.5 mg   Plan last modified:  Renee Guy RPH (3/12/2021)   Next INR check:  3/31/2022   Priority:  Maintenance   Target end date:  Indefinite    Indications    Status post mitral valve replacement [Z95.2]  Warfarin anticoagulation [Z79.01]             Anticoagulation Episode Summary     INR check location:      Preferred lab:      Send INR reminders to:   [facility] [place]    Comments:        Anticoagulation Care Providers     Provider Role Specialty Phone number    Lindsey Vargas MD Referring Cardiology [phone]          Clinic Interview:  Patient Findings     Positives:  Change in health, Change in medications, Other complaints    Negatives:  Signs/symptoms of thrombosis, Signs/symptoms of bleeding,   Laboratory test error suspected, Change in alcohol use, Change in   activity, Upcoming invasive procedure, Emergency department visit,   Upcoming dental procedure, Missed doses, Extra doses, Change in   diet/appetite, Hospital admission, Bruising    Comments:  Reports sinus infection, for which she is taking 10-day course   of cefdinir (Day 3 of 10). Reports mild fever that lasted only 1 day.   Denies changes in appetite.       Clinical Outcomes     Negatives:  Major bleeding event, Thromboembolic event,   Anticoagulation-related hospital admission, Anticoagulation-related ED   visit, Anticoagulation-related fatality    Comments:  Reports sinus infection, for which she is taking 10-day course   of cefdinir (Day 3 of 10). Reports mild fever that lasted only 1 day.   Denies changes in appetite.         INR History:  Anticoagulation Monitoring 2/1/2022 2/22/2022 3/24/2022   INR 3.4 2.8 3.8   INR Date 2/1/2022 2/22/2022 3/24/2022   INR  Goal 2.5-3.5 2.5-3.5 2.5-3.5   Trend Same Same Same   Last Week Total 47.5 mg 47.5 mg 47.5 mg   Next Week Total 47.5 mg 47.5 mg 45 mg   Sun 5 mg 5 mg 5 mg   Mon 7.5 mg 7.5 mg 7.5 mg   Tue 7.5 mg 7.5 mg 7.5 mg   Wed 7.5 mg 7.5 mg 7.5 mg   Thu 5 mg 5 mg 2.5 mg (3/24)   Fri 7.5 mg 7.5 mg 7.5 mg   Sat 7.5 mg 7.5 mg 7.5 mg   Visit Report - - -   Some recent data might be hidden       Plan:  1. INR is Supratherapeutic today- see above in Anticoagulation Summary.  Will instruct Yana Lehman to Change their warfarin regimen- see above in Anticoagulation Summary.  2. Follow up in 1 week  3. Patient declines warfarin refills.  4. Verbal and written information provided. Patient expresses understanding and has no further questions at this time.    Grady Salazar, PharmD

## 2022-03-25 ENCOUNTER — APPOINTMENT (OUTPATIENT)
Dept: PHARMACY | Facility: HOSPITAL | Age: 67
End: 2022-03-25

## 2022-03-28 RX ORDER — WARFARIN SODIUM 5 MG/1
TABLET ORAL
Qty: 130 TABLET | Refills: 1 | Status: SHIPPED | OUTPATIENT
Start: 2022-03-28 | End: 2022-06-21 | Stop reason: SDUPTHER

## 2022-03-31 ENCOUNTER — ANTICOAGULATION VISIT (OUTPATIENT)
Dept: PHARMACY | Facility: HOSPITAL | Age: 67
End: 2022-03-31

## 2022-03-31 DIAGNOSIS — Z79.01 WARFARIN ANTICOAGULATION: ICD-10-CM

## 2022-03-31 DIAGNOSIS — Z95.2 STATUS POST MITRAL VALVE REPLACEMENT: Primary | ICD-10-CM

## 2022-03-31 LAB
INR PPP: 2.7 (ref 0.91–1.09)
PROTHROMBIN TIME: 32 SECONDS (ref 10–13.8)

## 2022-03-31 PROCEDURE — 85610 PROTHROMBIN TIME: CPT

## 2022-03-31 PROCEDURE — 36416 COLLJ CAPILLARY BLOOD SPEC: CPT

## 2022-03-31 NOTE — PROGRESS NOTES
Anticoagulation Clinic Progress Note    Anticoagulation Summary  As of 3/31/2022    INR goal:  2.5-3.5   TTR:  78.8 % (3.5 y)   INR used for dosin.7 (3/31/2022)   Warfarin maintenance plan:  5 mg every Sun, Thu; 7.5 mg all other days   Weekly warfarin total:  47.5 mg   No change documented:  Emely Greene   Plan last modified:  Renee Guy RPH (3/12/2021)   Next INR check:  2022   Priority:  Maintenance   Target end date:  Indefinite    Indications    Status post mitral valve replacement [Z95.2]  Warfarin anticoagulation [Z79.01]             Anticoagulation Episode Summary     INR check location:      Preferred lab:      Send INR reminders to:   [facility]    Comments:        Anticoagulation Care Providers     Provider Role Specialty Phone number    Lindsey Vargas MD Referring Cardiology [phone]          Clinic Interview:      INR History:  Anticoagulation Monitoring 2022 3/24/2022 3/31/2022   INR 2.8 3.8 2.7   INR Date 2022 3/24/2022 3/31/2022   INR Goal 2.5-3.5 2.5-3.5 2.5-3.5   Trend Same Same Same   Last Week Total 47.5 mg 47.5 mg 45 mg   Next Week Total 47.5 mg 45 mg 47.5 mg   Sun 5 mg 5 mg 5 mg   Mon 7.5 mg 7.5 mg 7.5 mg   Tue 7.5 mg 7.5 mg 7.5 mg   Wed 7.5 mg 7.5 mg 7.5 mg   Thu 5 mg 2.5 mg (3/24) 5 mg   Fri 7.5 mg 7.5 mg 7.5 mg   Sat 7.5 mg 7.5 mg 7.5 mg   Visit Report - - -   Some recent data might be hidden       Plan:  1. INR is therapeutic today- see above in Anticoagulation Summary.   Will instruct Ynaa Lehman to continue their warfarin regimen- see above in Anticoagulation Summary.  2. Follow up in 3 weeks.  3. Patient declines warfarin refills.  4. Verbal and written information provided. Patient expresses understanding and has no further questions at this time.    Emely Greene

## 2022-04-21 ENCOUNTER — ANTICOAGULATION VISIT (OUTPATIENT)
Dept: PHARMACY | Facility: HOSPITAL | Age: 67
End: 2022-04-21

## 2022-04-21 DIAGNOSIS — Z79.01 WARFARIN ANTICOAGULATION: ICD-10-CM

## 2022-04-21 DIAGNOSIS — Z95.2 STATUS POST MITRAL VALVE REPLACEMENT: Primary | ICD-10-CM

## 2022-04-21 LAB
INR PPP: 3.4 (ref 0.91–1.09)
PROTHROMBIN TIME: 41.3 SECONDS (ref 10–13.8)

## 2022-04-21 PROCEDURE — 85610 PROTHROMBIN TIME: CPT

## 2022-04-21 PROCEDURE — 36416 COLLJ CAPILLARY BLOOD SPEC: CPT

## 2022-04-21 NOTE — PROGRESS NOTES
Anticoagulation Clinic Progress Note    Anticoagulation Summary  As of 4/21/2022    INR goal:  2.5-3.5   TTR:  79.2 % (3.5 y)   INR used for dosing:  3.4 (4/21/2022)   Warfarin maintenance plan:  5 mg every Sun, Thu; 7.5 mg all other days   Weekly warfarin total:  47.5 mg   No change documented:  Emely Greene   Plan last modified:  Renee Guy RPH (3/12/2021)   Next INR check:  5/19/2022   Priority:  Maintenance   Target end date:  Indefinite    Indications    Status post mitral valve replacement [Z95.2]  Warfarin anticoagulation [Z79.01]             Anticoagulation Episode Summary     INR check location:      Preferred lab:      Send INR reminders to:  [facility]    Comments:        Anticoagulation Care Providers     Provider Role Specialty Phone number    Lindsey Vargas MD Referring Cardiology [phone]          Clinic Interview:  Patient Findings     Negatives:  Signs/symptoms of thrombosis, Signs/symptoms of bleeding,   Laboratory test error suspected, Change in health, Change in alcohol use,   Change in activity, Upcoming invasive procedure, Emergency department   visit, Upcoming dental procedure, Missed doses, Extra doses, Change in   medications, Change in diet/appetite, Hospital admission, Bruising, Other   complaints      Clinical Outcomes     Negatives:  Major bleeding event, Thromboembolic event,   Anticoagulation-related hospital admission, Anticoagulation-related ED   visit, Anticoagulation-related fatality        INR History:  Anticoagulation Monitoring 3/24/2022 3/31/2022 4/21/2022   INR 3.8 2.7 3.4   INR Date 3/24/2022 3/31/2022 4/21/2022   INR Goal 2.5-3.5 2.5-3.5 2.5-3.5   Trend Same Same Same   Last Week Total 47.5 mg 45 mg 47.5 mg   Next Week Total 45 mg 47.5 mg 47.5 mg   Sun 5 mg 5 mg 5 mg   Mon 7.5 mg 7.5 mg 7.5 mg   Tue 7.5 mg 7.5 mg 7.5 mg   Wed 7.5 mg 7.5 mg 7.5 mg   Thu 2.5 mg (3/24) 5 mg 5 mg   Fri 7.5 mg 7.5 mg 7.5 mg   Sat 7.5 mg 7.5 mg 7.5 mg    Visit Report - - -   Some recent data might be hidden       Plan:  1. INR is therapeutic today- see above in Anticoagulation Summary.   Will instruct Yana Lehman to continue their warfarin regimen- see above in Anticoagulation Summary.  2. Follow up in 4 weeks.  3. Patient declines warfarin refills.  4. Verbal and written information provided. Patient expresses understanding and has no further questions at this time.    Emely Greene

## 2022-05-19 ENCOUNTER — ANTICOAGULATION VISIT (OUTPATIENT)
Dept: PHARMACY | Facility: HOSPITAL | Age: 67
End: 2022-05-19

## 2022-05-19 DIAGNOSIS — Z95.2 STATUS POST MITRAL VALVE REPLACEMENT: Primary | ICD-10-CM

## 2022-05-19 DIAGNOSIS — Z79.01 WARFARIN ANTICOAGULATION: ICD-10-CM

## 2022-05-19 LAB
INR PPP: 2.6 (ref 0.91–1.09)
PROTHROMBIN TIME: 31.3 SECONDS (ref 10–13.8)

## 2022-05-19 PROCEDURE — 85610 PROTHROMBIN TIME: CPT

## 2022-05-19 PROCEDURE — 36416 COLLJ CAPILLARY BLOOD SPEC: CPT

## 2022-05-19 NOTE — PROGRESS NOTES
Anticoagulation Clinic Progress Note    Anticoagulation Summary  As of 2022    INR goal:  2.5-3.5   TTR:  79.6 % (3.6 y)   INR used for dosin.6 (2022)   Warfarin maintenance plan:  5 mg every Sun, Thu; 7.5 mg all other days   Weekly warfarin total:  47.5 mg   No change documented:  Grady Salazar, PharmD   Plan last modified:  Renee Guy RPH (3/12/2021)   Next INR check:  2022   Priority:  Maintenance   Target end date:  Indefinite    Indications    Status post mitral valve replacement [Z95.2]  Warfarin anticoagulation [Z79.01]             Anticoagulation Episode Summary     INR check location:      Preferred lab:      Send INR reminders to:   [facility]    Comments:        Anticoagulation Care Providers     Provider Role Specialty Phone number    Lindsey Vargas MD Referring Cardiology [phone]          Clinic Interview:  Patient Findings     Positives:  Change in alcohol use    Negatives:  Signs/symptoms of thrombosis, Signs/symptoms of bleeding,   Laboratory test error suspected, Change in health, Change in activity,   Upcoming invasive procedure, Emergency department visit, Upcoming dental   procedure, Missed doses, Extra doses, Change in medications, Change in   diet/appetite, Hospital admission, Bruising, Other complaints    Comments:  Reports 1 EtOH drink on her birthday; usually does not drink.      Clinical Outcomes     Negatives:  Major bleeding event, Thromboembolic event,   Anticoagulation-related hospital admission, Anticoagulation-related ED   visit, Anticoagulation-related fatality    Comments:  Reports 1 EtOH drink on her birthday; usually does not drink.        INR History:  Anticoagulation Monitoring 3/31/2022 2022 2022   INR 2.7 3.4 2.6   INR Date 3/31/2022 2022 2022   INR Goal 2.5-3.5 2.5-3.5 2.5-3.5   Trend Same Same Same   Last Week Total 45 mg 47.5 mg 47.5 mg   Next Week Total 47.5 mg 47.5 mg 47.5 mg   Sun 5 mg 5 mg 5 mg    Mon 7.5 mg 7.5 mg 7.5 mg   Tue 7.5 mg 7.5 mg 7.5 mg   Wed 7.5 mg 7.5 mg 7.5 mg   Thu 5 mg 5 mg 5 mg   Fri 7.5 mg 7.5 mg 7.5 mg   Sat 7.5 mg 7.5 mg 7.5 mg   Visit Report - - -   Some recent data might be hidden       Plan:  1. INR is Therapeutic today- see above in Anticoagulation Summary.  Will instruct Yana Lehman to Continue their warfarin regimen- see above in Anticoagulation Summary.  2. Follow up in 4 weeks  3. Patient declines warfarin refills.  4. Verbal and written information provided. Patient expresses understanding and has no further questions at this time.    Grady Salazar, PharmD

## 2022-06-16 ENCOUNTER — APPOINTMENT (OUTPATIENT)
Dept: PHARMACY | Facility: HOSPITAL | Age: 67
End: 2022-06-16

## 2022-06-21 ENCOUNTER — ANTICOAGULATION VISIT (OUTPATIENT)
Dept: PHARMACY | Facility: HOSPITAL | Age: 67
End: 2022-06-21

## 2022-06-21 DIAGNOSIS — Z95.2 STATUS POST MITRAL VALVE REPLACEMENT: Primary | ICD-10-CM

## 2022-06-21 DIAGNOSIS — Z79.01 WARFARIN ANTICOAGULATION: ICD-10-CM

## 2022-06-21 LAB
INR PPP: 2.4 (ref 0.91–1.09)
PROTHROMBIN TIME: 28.4 SECONDS (ref 10–13.8)

## 2022-06-21 PROCEDURE — 85610 PROTHROMBIN TIME: CPT

## 2022-06-21 PROCEDURE — 36416 COLLJ CAPILLARY BLOOD SPEC: CPT

## 2022-06-21 RX ORDER — WARFARIN SODIUM 5 MG/1
TABLET ORAL
Qty: 125 TABLET | Refills: 1 | Status: SHIPPED | OUTPATIENT
Start: 2022-06-21 | End: 2022-09-27 | Stop reason: SDUPTHER

## 2022-06-21 NOTE — PROGRESS NOTES
Anticoagulation Clinic Progress Note    Anticoagulation Summary  As of 2022    INR goal:  2.5-3.5   TTR:  78.9 % (3.7 y)   INR used for dosin.4 (2022)   Warfarin maintenance plan:  5 mg every Sun, Thu; 7.5 mg all other days   Weekly warfarin total:  47.5 mg   No change documented:  Magdalena Davila, Pharmacy Technician   Plan last modified:  Renee Guy RPH (3/12/2021)   Next INR check:  2022   Priority:  Maintenance   Target end date:  Indefinite    Indications    Status post mitral valve replacement [Z95.2]  Warfarin anticoagulation [Z79.01]             Anticoagulation Episode Summary     INR check location:      Preferred lab:      Send INR reminders to:  [facility]    Comments:        Anticoagulation Care Providers     Provider Role Specialty Phone number    Lindsey Vargas MD Referring Cardiology [phone]          Clinic Interview:  Patient Findings     Positives:  Change in diet/appetite    Negatives:  Signs/symptoms of thrombosis, Signs/symptoms of bleeding,   Laboratory test error suspected, Change in health, Change in alcohol use,   Change in activity, Upcoming invasive procedure, Emergency department   visit, Upcoming dental procedure, Missed doses, Extra doses, Change in   medications, Hospital admission, Bruising, Other complaints    Comments:  Pt reports eating more salads      Clinical Outcomes     Negatives:  Major bleeding event, Thromboembolic event,   Anticoagulation-related hospital admission, Anticoagulation-related ED   visit, Anticoagulation-related fatality    Comments:  Pt reports eating more salads        INR History:  Anticoagulation Monitoring 2022   INR 3.4 2.6 2.4   INR Date 2022   INR Goal 2.5-3.5 2.5-3.5 2.5-3.5   Trend Same Same Same   Last Week Total 47.5 mg 47.5 mg 47.5 mg   Next Week Total 47.5 mg 47.5 mg 47.5 mg   Sun 5 mg 5 mg 5 mg   Mon 7.5 mg 7.5 mg 7.5 mg   Tue 7.5 mg 7.5 mg 7.5  mg   Wed 7.5 mg 7.5 mg 7.5 mg   Thu 5 mg 5 mg 5 mg   Fri 7.5 mg 7.5 mg 7.5 mg   Sat 7.5 mg 7.5 mg 7.5 mg   Visit Report - - -   Some recent data might be hidden       Plan:  1. INR is slightly subtherapeutic today (INR 2.4; goal 2.5-3.5) - see above in Anticoagulation Summary.   Will instruct Yana Lehman to continue their warfarin regimen- see above in Anticoagulation Summary.  2. Follow up in 2 weeks.  3. Patient desires warfarin refills.  4. Verbal and written information provided. Patient expresses understanding and has no further questions at this time.    Magdalena Davila, Pharmacy Technician

## 2022-07-05 ENCOUNTER — ANTICOAGULATION VISIT (OUTPATIENT)
Dept: PHARMACY | Facility: HOSPITAL | Age: 67
End: 2022-07-05

## 2022-07-05 DIAGNOSIS — Z79.01 WARFARIN ANTICOAGULATION: ICD-10-CM

## 2022-07-05 DIAGNOSIS — Z95.2 STATUS POST MITRAL VALVE REPLACEMENT: Primary | ICD-10-CM

## 2022-07-05 LAB
INR PPP: 2.6 (ref 0.91–1.09)
PROTHROMBIN TIME: 31.3 SECONDS (ref 10–13.8)

## 2022-07-05 PROCEDURE — 36416 COLLJ CAPILLARY BLOOD SPEC: CPT

## 2022-07-05 PROCEDURE — 85610 PROTHROMBIN TIME: CPT

## 2022-07-05 NOTE — PROGRESS NOTES
Anticoagulation Clinic Progress Note    Anticoagulation Summary  As of 2022    INR goal:  2.5-3.5   TTR:  78.6 % (3.8 y)   INR used for dosin.6 (2022)   Warfarin maintenance plan:  5 mg every Sun, Thu; 7.5 mg all other days   Weekly warfarin total:  47.5 mg   No change documented:  Emely Greene   Plan last modified:  Renee Guy RPH (3/12/2021)   Next INR check:  2022   Priority:  Maintenance   Target end date:  Indefinite    Indications    Status post mitral valve replacement [Z95.2]  Warfarin anticoagulation [Z79.01]             Anticoagulation Episode Summary     INR check location:      Preferred lab:      Send INR reminders to:  [facility]    Comments:        Anticoagulation Care Providers     Provider Role Specialty Phone number    Lindsey Vargas MD Referring Cardiology [phone]          Clinic Interview:  Patient Findings     Negatives:  Signs/symptoms of thrombosis, Signs/symptoms of bleeding,   Laboratory test error suspected, Change in health, Change in alcohol use,   Change in activity, Upcoming invasive procedure, Emergency department   visit, Upcoming dental procedure, Missed doses, Extra doses, Change in   medications, Change in diet/appetite, Hospital admission, Bruising, Other   complaints      Clinical Outcomes     Negatives:  Major bleeding event, Thromboembolic event,   Anticoagulation-related hospital admission, Anticoagulation-related ED   visit, Anticoagulation-related fatality        INR History:  Anticoagulation Monitoring 2022   INR 2.6 2.4 2.6   INR Date 2022   INR Goal 2.5-3.5 2.5-3.5 2.5-3.5   Trend Same Same Same   Last Week Total 47.5 mg 47.5 mg 47.5 mg   Next Week Total 47.5 mg 47.5 mg 47.5 mg   Sun 5 mg 5 mg 5 mg   Mon 7.5 mg 7.5 mg 7.5 mg   Tue 7.5 mg 7.5 mg 7.5 mg   Wed 7.5 mg 7.5 mg 7.5 mg   Thu 5 mg 5 mg 5 mg   Fri 7.5 mg 7.5 mg 7.5 mg   Sat 7.5 mg 7.5 mg 7.5 mg   Visit  Report - - -   Some recent data might be hidden       Plan:  1. INR is therapeutic today- see above in Anticoagulation Summary.   Will instruct Yana Lehman to continue their warfarin regimen- see above in Anticoagulation Summary.  2. Follow up in 4 weeks.  3. Patient declines warfarin refills.  4. Verbal and written information provided. Patient expresses understanding and has no further questions at this time.    Emely Greene

## 2022-08-02 ENCOUNTER — ANTICOAGULATION VISIT (OUTPATIENT)
Dept: PHARMACY | Facility: HOSPITAL | Age: 67
End: 2022-08-02

## 2022-08-02 DIAGNOSIS — Z95.2 STATUS POST MITRAL VALVE REPLACEMENT: Primary | ICD-10-CM

## 2022-08-02 DIAGNOSIS — Z79.01 WARFARIN ANTICOAGULATION: ICD-10-CM

## 2022-08-02 LAB
INR PPP: 2.9 (ref 0.91–1.09)
PROTHROMBIN TIME: 34.7 SECONDS (ref 10–13.8)

## 2022-08-02 PROCEDURE — 85610 PROTHROMBIN TIME: CPT

## 2022-08-02 PROCEDURE — 36416 COLLJ CAPILLARY BLOOD SPEC: CPT

## 2022-08-02 NOTE — PROGRESS NOTES
Anticoagulation Clinic Progress Note    Anticoagulation Summary  As of 2022    INR goal:  2.5-3.5   TTR:  79.0 % (3.8 y)   INR used for dosin.9 (2022)   Warfarin maintenance plan:  5 mg every Sun, Thu; 7.5 mg all other days   Weekly warfarin total:  47.5 mg   No change documented:  Kirstin Edwards [facility]   Plan last modified:  Renee Guy RPH (3/12/2021)   Next INR check:  2022   Priority:  Maintenance   Target end date:  Indefinite    Indications    Status post mitral valve replacement [Z95.2]  Warfarin anticoagulation [Z79.01]             Anticoagulation Episode Summary     INR check location:      Preferred lab:      Send INR reminders to:  [facility]    Comments:        Anticoagulation Care Providers     Provider Role Specialty Phone number    Lindsey Vargas MD Referring Cardiology [phone]          Clinic Interview:  Patient Findings     Negatives:  Signs/symptoms of thrombosis, Signs/symptoms of bleeding,   Laboratory test error suspected, Change in health, Change in alcohol use,   Change in activity, Upcoming invasive procedure, Emergency department   visit, Upcoming dental procedure, Missed doses, Extra doses, Change in   medications, Change in diet/appetite, Hospital admission, Bruising, Other   complaints      Clinical Outcomes     Negatives:  Major bleeding event, Thromboembolic event,   Anticoagulation-related hospital admission, Anticoagulation-related ED   visit, Anticoagulation-related fatality        INR History:  Anticoagulation Monitoring 2022   INR 2.4 2.6 2.9   INR Date 2022   INR Goal 2.5-3.5 2.5-3.5 2.5-3.5   Trend Same Same Same   Last Week Total 47.5 mg 47.5 mg 47.5 mg   Next Week Total 47.5 mg 47.5 mg 47.5 mg   Sun 5 mg 5 mg 5 mg   Mon 7.5 mg 7.5 mg 7.5 mg   Tue 7.5 mg 7.5 mg 7.5 mg   Wed 7.5 mg 7.5 mg 7.5 mg   Thu 5 mg 5 mg 5 mg   Fri 7.5 mg 7.5 mg 7.5 mg   Sat 7.5 mg 7.5 mg 7.5 mg   Visit Report  - - -   Some recent data might be hidden       Plan:  1. INR is Therapeutic today- see above in Anticoagulation Summary.  Will instruct Yana Lehman to continue her current warfarin regimen- see above in Anticoagulation Summary.  2. Follow up in 4 weeks  3. Patient declines warfarin refills.  4. Verbal and written information provided. Patient expresses understanding and has no further questions at this time.    Kirstin Edwards, [facility]

## 2022-08-30 ENCOUNTER — ANTICOAGULATION VISIT (OUTPATIENT)
Dept: PHARMACY | Facility: HOSPITAL | Age: 67
End: 2022-08-30

## 2022-08-30 DIAGNOSIS — Z79.01 WARFARIN ANTICOAGULATION: ICD-10-CM

## 2022-08-30 DIAGNOSIS — Z95.2 STATUS POST MITRAL VALVE REPLACEMENT: Primary | ICD-10-CM

## 2022-08-30 LAB
INR PPP: 2.8 (ref 0.91–1.09)
PROTHROMBIN TIME: 33.6 SECONDS (ref 10–13.8)

## 2022-08-30 PROCEDURE — 85610 PROTHROMBIN TIME: CPT

## 2022-08-30 PROCEDURE — 36416 COLLJ CAPILLARY BLOOD SPEC: CPT

## 2022-08-30 NOTE — PROGRESS NOTES
Anticoagulation Clinic Progress Note    Anticoagulation Summary  As of 2022    INR goal:  2.5-3.5   TTR:  79.4 % (3.9 y)   INR used for dosin.8 (2022)   Warfarin maintenance plan:  5 mg every Sun, Thu; 7.5 mg all other days   Weekly warfarin total:  47.5 mg   No change documented:  Magdalena Davila, Pharmacy Technician   Plan last modified:  Renee Guy RPH (3/12/2021)   Next INR check:  2022   Priority:  Maintenance   Target end date:  Indefinite    Indications    Status post mitral valve replacement [Z95.2]  Warfarin anticoagulation [Z79.01]             Anticoagulation Episode Summary     INR check location:      Preferred lab:      Send INR reminders to:  [facility]    Comments:        Anticoagulation Care Providers     Provider Role Specialty Phone number    Lindsey Vargas MD Referring Cardiology [phone]          Clinic Interview:  Patient Findings     Negatives:  Signs/symptoms of thrombosis, Signs/symptoms of bleeding,   Laboratory test error suspected, Change in health, Change in alcohol use,   Change in activity, Upcoming invasive procedure, Emergency department   visit, Upcoming dental procedure, Missed doses, Extra doses, Change in   medications, Change in diet/appetite, Hospital admission, Bruising, Other   complaints      Clinical Outcomes     Negatives:  Major bleeding event, Thromboembolic event,   Anticoagulation-related hospital admission, Anticoagulation-related ED   visit, Anticoagulation-related fatality        INR History:  Anticoagulation Monitoring 2022   INR 2.6 2.9 2.8   INR Date 2022   INR Goal 2.5-3.5 2.5-3.5 2.5-3.5   Trend Same Same Same   Last Week Total 47.5 mg 47.5 mg 47.5 mg   Next Week Total 47.5 mg 47.5 mg 47.5 mg   Sun 5 mg 5 mg 5 mg   Mon 7.5 mg 7.5 mg 7.5 mg   Tue 7.5 mg 7.5 mg 7.5 mg   Wed 7.5 mg 7.5 mg 7.5 mg   Thu 5 mg 5 mg 5 mg   Fri 7.5 mg 7.5 mg 7.5 mg   Sat 7.5 mg 7.5 mg 7.5 mg    Visit Report - - -   Some recent data might be hidden       Plan:  1. INR is therapeutic today- see above in Anticoagulation Summary.   Will instruct Yana Lehman to continue their warfarin regimen- see above in Anticoagulation Summary.  2. Follow up in 4 weeks.  3. Patient declines warfarin refills.  4. Verbal and written information provided. Patient expresses understanding and has no further questions at this time.    Magdalena Davila, Pharmacy Technician

## 2022-09-27 ENCOUNTER — ANTICOAGULATION VISIT (OUTPATIENT)
Dept: PHARMACY | Facility: HOSPITAL | Age: 67
End: 2022-09-27

## 2022-09-27 DIAGNOSIS — Z95.2 STATUS POST MITRAL VALVE REPLACEMENT: Primary | ICD-10-CM

## 2022-09-27 DIAGNOSIS — Z79.01 WARFARIN ANTICOAGULATION: ICD-10-CM

## 2022-09-27 LAB
INR PPP: 2.7 (ref 0.91–1.09)
PROTHROMBIN TIME: 31.9 SECONDS (ref 10–13.8)

## 2022-09-27 PROCEDURE — 36416 COLLJ CAPILLARY BLOOD SPEC: CPT

## 2022-09-27 PROCEDURE — 85610 PROTHROMBIN TIME: CPT

## 2022-09-27 RX ORDER — WARFARIN SODIUM 5 MG/1
TABLET ORAL
Qty: 125 TABLET | Refills: 1 | Status: SHIPPED | OUTPATIENT
Start: 2022-09-27 | End: 2022-12-22 | Stop reason: SDUPTHER

## 2022-09-27 NOTE — PROGRESS NOTES
I am providing this documentation to verify that I have sent in refills requested by the patient.     I have supervised and reviewed the notes, assessments, and/or procedures performed by our  pharmacy student . The documented assessment and plan were developed cooperatively, however the plan was not implemented in my presence. I concur with the documentation of this patient encounter.     Aris Salas, PharmD  Pharmacy Resident

## 2022-09-27 NOTE — PROGRESS NOTES
Anticoagulation Clinic Progress Note    Anticoagulation Summary  As of 2022    INR goal:  2.5-3.5   TTR:  79.8 % (4 y)   INR used for dosin.7 (2022)   Warfarin maintenance plan:  5 mg every Sun, Thu; 7.5 mg all other days   Weekly warfarin total:  47.5 mg   Plan last modified:  Renee Guy RPH (3/12/2021)   Next INR check:  10/25/2022   Priority:  Maintenance   Target end date:  Indefinite    Indications    Status post mitral valve replacement [Z95.2]  Warfarin anticoagulation [Z79.01]             Anticoagulation Episode Summary     INR check location:      Preferred lab:      Send INR reminders to:   [facility]    Comments:        Anticoagulation Care Providers     Provider Role Specialty Phone number    Lindsey Vargas MD Referring Cardiology [phone]          Clinic Interview:  Patient Findings     Positives:  Other complaints    Negatives:  Signs/symptoms of thrombosis, Signs/symptoms of bleeding,   Laboratory test error suspected, Change in health, Change in alcohol use,   Change in activity, Upcoming invasive procedure, Emergency department   visit, Upcoming dental procedure, Missed doses, Extra doses, Change in   medications, Change in diet/appetite, Hospital admission, Bruising    Comments:  Hurt finger and it bled for 1 munite before it stopped. No   changes in diet or medications. No upcoming procedures.      Clinical Outcomes     Negatives:  Major bleeding event, Thromboembolic event,   Anticoagulation-related hospital admission, Anticoagulation-related ED   visit, Anticoagulation-related fatality    Comments:  Hurt finger and it bled for 1 munite before it stopped. No   changes in diet or medications. No upcoming procedures.        INR History:  Anticoagulation Monitoring 2022   INR 2.9 2.8 2.7   INR Date 2022   INR Goal 2.5-3.5 2.5-3.5 2.5-3.5   Trend Same Same Same   Last Week Total 47.5 mg 47.5 mg 47.5 mg    Next Week Total 47.5 mg 47.5 mg 47.5 mg   Sun 5 mg 5 mg 5 mg   Mon 7.5 mg 7.5 mg 7.5 mg   Tue 7.5 mg 7.5 mg 7.5 mg   Wed 7.5 mg 7.5 mg 7.5 mg   Thu 5 mg 5 mg 5 mg   Fri 7.5 mg 7.5 mg 7.5 mg   Sat 7.5 mg 7.5 mg 7.5 mg   Visit Report - - -   Some recent data might be hidden       Plan:  1. INR is Therapeutic today- see above in Anticoagulation Summary.  Will instruct Yana Lehman to Continue their warfarin regimen- see above in Anticoagulation Summary.  2. Follow up in 4 weeks  3. Patient desires warfarin refills. Carlos Alvarez  4. Verbal and written information provided. Patient expresses understanding and has no further questions at this time.    Johnny Reddy, Pharmacy Intern

## 2022-10-25 ENCOUNTER — ANTICOAGULATION VISIT (OUTPATIENT)
Dept: PHARMACY | Facility: HOSPITAL | Age: 67
End: 2022-10-25

## 2022-10-25 DIAGNOSIS — Z79.01 WARFARIN ANTICOAGULATION: ICD-10-CM

## 2022-10-25 DIAGNOSIS — Z95.2 STATUS POST MITRAL VALVE REPLACEMENT: Primary | ICD-10-CM

## 2022-10-25 LAB
INR PPP: 4 (ref 0.91–1.09)
PROTHROMBIN TIME: 48.6 SECONDS (ref 10–13.8)

## 2022-10-25 PROCEDURE — 36416 COLLJ CAPILLARY BLOOD SPEC: CPT

## 2022-10-25 PROCEDURE — 85610 PROTHROMBIN TIME: CPT

## 2022-10-25 PROCEDURE — G0463 HOSPITAL OUTPT CLINIC VISIT: HCPCS

## 2022-10-25 NOTE — PROGRESS NOTES
Anticoagulation Clinic Progress Note    Anticoagulation Summary  As of 10/25/2022    INR goal:  2.5-3.5   TTR:  79.5 % (4.1 y)   INR used for dosin.0 (10/25/2022)   Warfarin maintenance plan:  5 mg every Sun, Thu; 7.5 mg all other days   Weekly warfarin total:  47.5 mg   Plan last modified:  Renee Guy RPH (3/12/2021)   Next INR check:  2022   Priority:  Maintenance   Target end date:  Indefinite    Indications    Status post mitral valve replacement [Z95.2]  Warfarin anticoagulation [Z79.01]             Anticoagulation Episode Summary     INR check location:      Preferred lab:      Send INR reminders to:  [facility]    Comments:        Anticoagulation Care Providers     Provider Role Specialty Phone number    Lindsey Vargas MD Referring Cardiology [phone]          Clinic Interview:  Patient Findings     Positives:  Change in diet/appetite, Other complaints    Negatives:  Signs/symptoms of thrombosis, Signs/symptoms of bleeding,   Laboratory test error suspected, Change in health, Change in alcohol use,   Change in activity, Upcoming invasive procedure, Emergency department   visit, Upcoming dental procedure, Missed doses, Extra doses, Change in   medications, Hospital admission, Bruising    Comments:  Reports  is on palliative care in hospital at present,   and her passing is expected in the coming days. This has resulted in   increased stress and inconsistent meals.       Clinical Outcomes     Negatives:  Major bleeding event, Thromboembolic event,   Anticoagulation-related hospital admission, Anticoagulation-related ED   visit, Anticoagulation-related fatality    Comments:  Reports  is on palliative care in hospital at present,   and her passing is expected in the coming days. This has resulted in   increased stress and inconsistent meals.         INR History:  Anticoagulation Monitoring 2022 2022 10/25/2022   INR 2.8 2.7 4.0   INR Date 2022  9/27/2022 10/25/2022   INR Goal 2.5-3.5 2.5-3.5 2.5-3.5   Trend Same Same Same   Last Week Total 47.5 mg 47.5 mg 47.5 mg   Next Week Total 47.5 mg 47.5 mg 42.5 mg   Sun 5 mg 5 mg 5 mg   Mon 7.5 mg 7.5 mg 7.5 mg   Tue 7.5 mg 7.5 mg 2.5 mg (10/25); Otherwise 7.5 mg   Wed 7.5 mg 7.5 mg 7.5 mg   Thu 5 mg 5 mg 5 mg   Fri 7.5 mg 7.5 mg 7.5 mg   Sat 7.5 mg 7.5 mg 7.5 mg   Visit Report - - -   Some recent data might be hidden       Plan:  1. INR is Supratherapeutic today- see above in Anticoagulation Summary.  Will instruct Yana Lehman to Change their warfarin regimen- see above in Anticoagulation Summary.  2. Follow up in 2 weeks  3. Patient declines warfarin refills.  4. Verbal and written information provided. Patient expresses understanding and has no further questions at this time.    Grady Salazar, PharmD

## 2022-11-08 ENCOUNTER — ANTICOAGULATION VISIT (OUTPATIENT)
Dept: PHARMACY | Facility: HOSPITAL | Age: 67
End: 2022-11-08

## 2022-11-08 DIAGNOSIS — Z79.01 WARFARIN ANTICOAGULATION: ICD-10-CM

## 2022-11-08 DIAGNOSIS — Z95.2 STATUS POST MITRAL VALVE REPLACEMENT: Primary | ICD-10-CM

## 2022-11-08 LAB
INR PPP: 2.8 (ref 0.91–1.09)
PROTHROMBIN TIME: 33.3 SECONDS (ref 10–13.8)

## 2022-11-08 PROCEDURE — 85610 PROTHROMBIN TIME: CPT

## 2022-11-08 PROCEDURE — 36416 COLLJ CAPILLARY BLOOD SPEC: CPT

## 2022-11-08 NOTE — PROGRESS NOTES
Anticoagulation Clinic Progress Note    Anticoagulation Summary  As of 2022    INR goal:  2.5-3.5   TTR:  79.3 % (4.1 y)   INR used for dosin.8 (2022)   Warfarin maintenance plan:  5 mg every Sun, Thu; 7.5 mg all other days; Starting 2022   Weekly warfarin total:  47.5 mg   No change documented:  Carmita Jacobs RPH   Plan last modified:  Renee Guy RPH (3/12/2021)   Next INR check:  11/15/2022   Priority:  Maintenance   Target end date:  Indefinite    Indications    Status post mitral valve replacement [Z95.2]  Warfarin anticoagulation [Z79.01]             Anticoagulation Episode Summary     INR check location:      Preferred lab:      Send INR reminders to:   [facility]    Comments:        Anticoagulation Care Providers     Provider Role Specialty Phone number    Lindsey Vargas MD Referring Cardiology [phone]          Clinic Interview:  Patient Findings     Positives:  Missed doses, Change in diet/appetite    Comments:  pt reports passing of her sister which caused her to miss two   doses last week. She also has had decreased appetite. INR is therapeutic,   but will rck in 1 week       Clinical Outcomes     Comments:  pt reports passing of her sister which caused her to miss two   doses last week. She also has had decreased appetite. INR is therapeutic,   but will rck in 1 week         INR History:  Anticoagulation Monitoring 2022 10/25/2022 2022   INR 2.7 4.0 2.8   INR Date 2022 10/25/2022 2022   INR Goal 2.5-3.5 2.5-3.5 2.5-3.5   Trend Same Same Same   Last Week Total 47.5 mg 47.5 mg 32.5 mg   Next Week Total 47.5 mg 42.5 mg 47.5 mg   Sun 5 mg 5 mg 5 mg   Mon 7.5 mg 7.5 mg 7.5 mg   Tue 7.5 mg 2.5 mg (10/25); Otherwise 7.5 mg 7.5 mg   Wed 7.5 mg 7.5 mg 7.5 mg   Thu 5 mg 5 mg 5 mg   Fri 7.5 mg 7.5 mg 7.5 mg   Sat 7.5 mg 7.5 mg 7.5 mg   Visit Report - - -   Some recent data might be hidden       Plan:  1. INR is Therapeutic today- see above in  Anticoagulation Summary.  Will instruct Yana Lehman to Continue their warfarin regimen- see above in Anticoagulation Summary.  2. Follow up in 1 week  3. Patient declines warfarin refills.  4. Verbal and written information provided. Patient expresses understanding and has no further questions at this time.    Carmita Jacobs [facility]

## 2022-11-15 ENCOUNTER — APPOINTMENT (OUTPATIENT)
Dept: PHARMACY | Facility: HOSPITAL | Age: 67
End: 2022-11-15

## 2022-11-17 ENCOUNTER — ANTICOAGULATION VISIT (OUTPATIENT)
Dept: PHARMACY | Facility: HOSPITAL | Age: 67
End: 2022-11-17

## 2022-11-17 DIAGNOSIS — Z95.2 STATUS POST MITRAL VALVE REPLACEMENT: Primary | ICD-10-CM

## 2022-11-17 DIAGNOSIS — Z79.01 WARFARIN ANTICOAGULATION: ICD-10-CM

## 2022-11-17 LAB
INR PPP: 3.4 (ref 0.91–1.09)
PROTHROMBIN TIME: 40.9 SECONDS (ref 10–13.8)

## 2022-11-17 PROCEDURE — 85610 PROTHROMBIN TIME: CPT

## 2022-11-17 PROCEDURE — 36416 COLLJ CAPILLARY BLOOD SPEC: CPT

## 2022-11-17 NOTE — PROGRESS NOTES
Anticoagulation Clinic Progress Note    Anticoagulation Summary  As of 11/17/2022    INR goal:  2.5-3.5   TTR:  79.4 % (4.1 y)   INR used for dosing:  3.4 (11/17/2022)   Warfarin maintenance plan:  5 mg every Sun, Thu; 7.5 mg all other days; Starting 11/17/2022   Weekly warfarin total:  47.5 mg   No change documented:  Grady Salazar, PharmD   Plan last modified:  Renee Guy RPH (3/12/2021)   Next INR check:  12/1/2022   Priority:  Maintenance   Target end date:  Indefinite    Indications    Status post mitral valve replacement [Z95.2]  Warfarin anticoagulation [Z79.01]             Anticoagulation Episode Summary     INR check location:      Preferred lab:      Send INR reminders to:   [facility] [place]    Comments:        Anticoagulation Care Providers     Provider Role Specialty Phone number    Lindsey Vargas MD Referring Cardiology [phone]          Clinic Interview:  Patient Findings     Negatives:  Signs/symptoms of thrombosis, Signs/symptoms of bleeding,   Laboratory test error suspected, Change in health, Change in alcohol use,   Change in activity, Upcoming invasive procedure, Emergency department   visit, Upcoming dental procedure, Missed doses, Extra doses, Change in   medications, Change in diet/appetite, Hospital admission, Bruising, Other   complaints    Comments:  Reports she her diet/appetite are back to normal.       Clinical Outcomes     Negatives:  Major bleeding event, Thromboembolic event,   Anticoagulation-related hospital admission, Anticoagulation-related ED   visit, Anticoagulation-related fatality    Comments:  Reports she her diet/appetite are back to normal.         INR History:  Anticoagulation Monitoring 10/25/2022 11/8/2022 11/17/2022   INR 4.0 2.8 3.4   INR Date 10/25/2022 11/8/2022 11/17/2022   INR Goal 2.5-3.5 2.5-3.5 2.5-3.5   Trend Same Same Same   Last Week Total 47.5 mg 32.5 mg 47.5 mg   Next Week Total 42.5 mg 47.5 mg 47.5 mg   Sun 5 mg 5 mg 5 mg   Mon 7.5  mg 7.5 mg 7.5 mg   Tue 2.5 mg (10/25); Otherwise 7.5 mg 7.5 mg 7.5 mg   Wed 7.5 mg 7.5 mg 7.5 mg   Thu 5 mg 5 mg 5 mg   Fri 7.5 mg 7.5 mg 7.5 mg   Sat 7.5 mg 7.5 mg 7.5 mg   Visit Report - - -   Some recent data might be hidden       Plan:  1. INR is Therapeutic today- see above in Anticoagulation Summary.  Will instruct Yana Lehman to Continue their warfarin regimen- see above in Anticoagulation Summary.  2. Follow up in 2 weeks  3. Patient declines warfarin refills.  4. Verbal and written information provided. Patient expresses understanding and has no further questions at this time.    Grady Salazar, PharmD

## 2022-12-05 ENCOUNTER — ANTICOAGULATION VISIT (OUTPATIENT)
Dept: PHARMACY | Facility: HOSPITAL | Age: 67
End: 2022-12-05

## 2022-12-05 DIAGNOSIS — Z95.2 STATUS POST MITRAL VALVE REPLACEMENT: Primary | ICD-10-CM

## 2022-12-05 DIAGNOSIS — Z79.01 WARFARIN ANTICOAGULATION: ICD-10-CM

## 2022-12-05 LAB
INR PPP: 1.9 (ref 0.91–1.09)
PROTHROMBIN TIME: 23.1 SECONDS (ref 10–13.8)

## 2022-12-05 PROCEDURE — 36416 COLLJ CAPILLARY BLOOD SPEC: CPT

## 2022-12-05 PROCEDURE — 85610 PROTHROMBIN TIME: CPT

## 2022-12-05 PROCEDURE — G0463 HOSPITAL OUTPT CLINIC VISIT: HCPCS

## 2022-12-05 NOTE — PROGRESS NOTES
Anticoagulation Clinic Progress Note    Anticoagulation Summary  As of 2022    INR goal:  2.5-3.5   TTR:  79.2 % (4.2 y)   INR used for dosin.9 (2022)   Warfarin maintenance plan:  5 mg every Sun, Thu; 7.5 mg all other days; Starting 2022   Weekly warfarin total:  47.5 mg   Plan last modified:  Renee Guy RPH (3/12/2021)   Next INR check:  2022   Priority:  Maintenance   Target end date:  Indefinite    Indications    Status post mitral valve replacement [Z95.2]  Warfarin anticoagulation [Z79.01]             Anticoagulation Episode Summary     INR check location:      Preferred lab:      Send INR reminders to:  [facility]    Comments:        Anticoagulation Care Providers     Provider Role Specialty Phone number    Lindsey Vargas MD Referring Cardiology [phone]          Clinic Interview:  Patient Findings     Positives:  Missed doses, Change in medications    Comments:  Doxycyline was started on  for 10 days for PNA. Appetite   is low. Missed dose on Saturday. Hesitant to not boost with indication.   Will boost and recheck on  to determine if doxycyline caused INR to   rise beyond 3.5       Clinical Outcomes     Comments:  Doxycyline was started on  for 10 days for PNA. Appetite   is low. Missed dose on Saturday. Hesitant to not boost with indication.   Will boost and recheck on  to determine if doxycyline caused INR to   rise beyond 3.5         INR History:  Anticoagulation Monitoring 2022   INR 2.8 3.4 1.9   INR Date 2022   INR Goal 2.5-3.5 2.5-3.5 2.5-3.5   Trend Same Same Same   Last Week Total 32.5 mg 47.5 mg 40 mg   Next Week Total 47.5 mg 47.5 mg 50 mg   Sun 5 mg 5 mg 5 mg   Mon 7.5 mg 7.5 mg 10 mg (); Otherwise 7.5 mg   Tue 7.5 mg 7.5 mg 7.5 mg   Wed 7.5 mg 7.5 mg 7.5 mg   Thu 5 mg 5 mg 5 mg   Fri 7.5 mg 7.5 mg 7.5 mg   Sat 7.5 mg 7.5 mg 7.5 mg   Visit Report - - -   Some recent  data might be hidden       Plan:  1. INR is Subtherapeutic today- see above in Anticoagulation Summary.  Will instruct Yana Lehman to Increase their warfarin regimen- see above in Anticoagulation Summary.  2. Follow up in 1 week  3. Patient declines warfarin refills.  4. Verbal and written information provided. Patient expresses understanding and has no further questions at this time.    Nickie Landin, [facility]

## 2022-12-14 ENCOUNTER — HOSPITAL ENCOUNTER (OUTPATIENT)
Dept: CARDIOLOGY | Facility: HOSPITAL | Age: 67
Discharge: HOME | End: 2022-12-14

## 2022-12-14 ENCOUNTER — TELEPHONE (OUTPATIENT)
Dept: CARDIOLOGY | Facility: CLINIC | Age: 67
End: 2022-12-14

## 2022-12-14 ENCOUNTER — ANTICOAGULATION VISIT (OUTPATIENT)
Dept: PHARMACY | Facility: HOSPITAL | Age: 67
End: 2022-12-14

## 2022-12-14 ENCOUNTER — LAB (OUTPATIENT)
Dept: LAB | Facility: HOSPITAL | Age: 67
End: 2022-12-14

## 2022-12-14 ENCOUNTER — OFFICE VISIT (OUTPATIENT)
Dept: CARDIOLOGY | Facility: CLINIC | Age: 67
End: 2022-12-14

## 2022-12-14 VITALS
HEART RATE: 73 BPM | WEIGHT: 107.6 LBS | SYSTOLIC BLOOD PRESSURE: 130 MMHG | DIASTOLIC BLOOD PRESSURE: 70 MMHG | BODY MASS INDEX: 19.8 KG/M2 | OXYGEN SATURATION: 99 % | HEIGHT: 62 IN

## 2022-12-14 VITALS
DIASTOLIC BLOOD PRESSURE: 70 MMHG | SYSTOLIC BLOOD PRESSURE: 130 MMHG | WEIGHT: 108 LBS | HEART RATE: 70 BPM | HEIGHT: 62 IN | BODY MASS INDEX: 19.88 KG/M2 | OXYGEN SATURATION: 95 %

## 2022-12-14 DIAGNOSIS — I34.2 NON-RHEUMATIC MITRAL VALVE STENOSIS: ICD-10-CM

## 2022-12-14 DIAGNOSIS — Z79.01 WARFARIN ANTICOAGULATION: ICD-10-CM

## 2022-12-14 DIAGNOSIS — Z95.2 STATUS POST MITRAL VALVE REPLACEMENT: ICD-10-CM

## 2022-12-14 DIAGNOSIS — I42.0 DILATED CARDIOMYOPATHY: ICD-10-CM

## 2022-12-14 DIAGNOSIS — Z95.2 STATUS POST MITRAL VALVE REPLACEMENT: Primary | ICD-10-CM

## 2022-12-14 DIAGNOSIS — E78.2 MIXED HYPERLIPIDEMIA: ICD-10-CM

## 2022-12-14 DIAGNOSIS — I34.0 NONRHEUMATIC MITRAL VALVE REGURGITATION: ICD-10-CM

## 2022-12-14 DIAGNOSIS — I48.21 PERMANENT ATRIAL FIBRILLATION: ICD-10-CM

## 2022-12-14 DIAGNOSIS — I27.20 PULMONARY HYPERTENSION: ICD-10-CM

## 2022-12-14 DIAGNOSIS — I48.21 PERMANENT ATRIAL FIBRILLATION: Primary | ICD-10-CM

## 2022-12-14 PROBLEM — IMO0001 HEALTHY ADULT: Status: RESOLVED | Noted: 2021-03-11 | Resolved: 2022-12-14

## 2022-12-14 PROBLEM — IMO0001 HEALTHY ADULT: Status: ACTIVE | Noted: 2021-03-11

## 2022-12-14 PROBLEM — I48.19 PERSISTENT ATRIAL FIBRILLATION: Status: RESOLVED | Noted: 2018-04-11 | Resolved: 2022-12-14

## 2022-12-14 LAB
ALBUMIN SERPL-MCNC: 3.8 G/DL (ref 3.5–5.2)
ALBUMIN/GLOB SERPL: 1.9 G/DL
ALP SERPL-CCNC: 73 U/L (ref 39–117)
ALT SERPL W P-5'-P-CCNC: 12 U/L (ref 1–33)
ANION GAP SERPL CALCULATED.3IONS-SCNC: 7 MMOL/L (ref 5–15)
AORTIC ARCH: 3.6 CM
ASCENDING AORTA: 3.4 CM
AST SERPL-CCNC: 24 U/L (ref 1–32)
BH CV ECHO MEAS - ACS: 1.55 CM
BH CV ECHO MEAS - AO MAX PG: 7.7 MMHG
BH CV ECHO MEAS - AO MEAN PG: 4.3 MMHG
BH CV ECHO MEAS - AO ROOT DIAM: 3.1 CM
BH CV ECHO MEAS - AO V2 MAX: 138.8 CM/SEC
BH CV ECHO MEAS - AO V2 VTI: 31.9 CM
BH CV ECHO MEAS - AVA(I,D): 1.96 CM2
BH CV ECHO MEAS - EDV(CUBED): 99.2 ML
BH CV ECHO MEAS - EDV(MOD-SP2): 57 ML
BH CV ECHO MEAS - EDV(MOD-SP4): 56 ML
BH CV ECHO MEAS - EF(MOD-BP): 59.5 %
BH CV ECHO MEAS - EF(MOD-SP2): 70.2 %
BH CV ECHO MEAS - EF(MOD-SP4): 48.2 %
BH CV ECHO MEAS - ESV(CUBED): 61.2 ML
BH CV ECHO MEAS - ESV(MOD-SP2): 17 ML
BH CV ECHO MEAS - ESV(MOD-SP4): 29 ML
BH CV ECHO MEAS - FS: 14.9 %
BH CV ECHO MEAS - IVS/LVPW: 0.88 CM
BH CV ECHO MEAS - IVSD: 0.7 CM
BH CV ECHO MEAS - LAT PEAK E' VEL: 6.3 CM/SEC
BH CV ECHO MEAS - LV DIASTOLIC VOL/BSA (35-75): 38.1 CM2
BH CV ECHO MEAS - LV MASS(C)D: 109.4 GRAMS
BH CV ECHO MEAS - LV MAX PG: 3.1 MMHG
BH CV ECHO MEAS - LV MEAN PG: 1.94 MMHG
BH CV ECHO MEAS - LV SYSTOLIC VOL/BSA (12-30): 19.7 CM2
BH CV ECHO MEAS - LV V1 MAX: 88 CM/SEC
BH CV ECHO MEAS - LV V1 VTI: 21.8 CM
BH CV ECHO MEAS - LVIDD: 4.6 CM
BH CV ECHO MEAS - LVIDS: 3.9 CM
BH CV ECHO MEAS - LVOT AREA: 2.9 CM2
BH CV ECHO MEAS - LVOT DIAM: 1.91 CM
BH CV ECHO MEAS - LVPWD: 0.8 CM
BH CV ECHO MEAS - MED PEAK E' VEL: 5.7 CM/SEC
BH CV ECHO MEAS - MV A DUR: 0.16 SEC
BH CV ECHO MEAS - MV DEC SLOPE: 1322 CM/SEC2
BH CV ECHO MEAS - MV DEC TIME: 0.14 MSEC
BH CV ECHO MEAS - MV E MAX VEL: 147 CM/SEC
BH CV ECHO MEAS - MV MAX PG: 10.6 MMHG
BH CV ECHO MEAS - MV MEAN PG: 5.4 MMHG
BH CV ECHO MEAS - MV P1/2T: 36.4 MSEC
BH CV ECHO MEAS - MV V2 VTI: 16.8 CM
BH CV ECHO MEAS - MVA(P1/2T): 6.1 CM2
BH CV ECHO MEAS - MVA(VTI): 3.7 CM2
BH CV ECHO MEAS - PA ACC TIME: 0.08 SEC
BH CV ECHO MEAS - PA PR(ACCEL): 42.5 MMHG
BH CV ECHO MEAS - PA V2 MAX: 124.9 CM/SEC
BH CV ECHO MEAS - PULM DIAS VEL: 32.6 CM/SEC
BH CV ECHO MEAS - PULM S/D: 0.74
BH CV ECHO MEAS - PULM SYS VEL: 24.2 CM/SEC
BH CV ECHO MEAS - QP/QS: 0.22
BH CV ECHO MEAS - RAP SYSTOLE: 8 MMHG
BH CV ECHO MEAS - RV MAX PG: 0.92 MMHG
BH CV ECHO MEAS - RV V1 MAX: 48 CM/SEC
BH CV ECHO MEAS - RV V1 VTI: 5.4 CM
BH CV ECHO MEAS - RVOT DIAM: 1.8 CM
BH CV ECHO MEAS - RVSP: 43 MMHG
BH CV ECHO MEAS - SI(MOD-SP2): 27.2 ML/M2
BH CV ECHO MEAS - SI(MOD-SP4): 18.4 ML/M2
BH CV ECHO MEAS - SUP REN AO DIAM: 2.3 CM
BH CV ECHO MEAS - SV(LVOT): 62.4 ML
BH CV ECHO MEAS - SV(MOD-SP2): 40 ML
BH CV ECHO MEAS - SV(MOD-SP4): 27 ML
BH CV ECHO MEAS - SV(RVOT): 13.8 ML
BH CV ECHO MEAS - TAPSE (>1.6): 1.69 CM
BH CV ECHO MEAS - TR MAX PG: 35 MMHG
BH CV ECHO MEAS - TR MAX VEL: 295.9 CM/SEC
BH CV ECHO MEASUREMENTS AVERAGE E/E' RATIO: 24.5
BH CV XLRA - RV BASE: 2.24 CM
BH CV XLRA - RV LENGTH: 6.8 CM
BH CV XLRA - RV MID: 2.23 CM
BH CV XLRA - TDI S': 6.6 CM/SEC
BILIRUB SERPL-MCNC: 0.4 MG/DL (ref 0–1.2)
BUN SERPL-MCNC: 15 MG/DL (ref 8–23)
BUN/CREAT SERPL: 21.4 (ref 7–25)
CALCIUM SPEC-SCNC: 8.8 MG/DL (ref 8.6–10.5)
CHLORIDE SERPL-SCNC: 106 MMOL/L (ref 98–107)
CHOLEST SERPL-MCNC: 134 MG/DL (ref 0–200)
CO2 SERPL-SCNC: 25 MMOL/L (ref 22–29)
CREAT SERPL-MCNC: 0.7 MG/DL (ref 0.57–1)
DEPRECATED RDW RBC AUTO: 45.7 FL (ref 37–54)
DIGOXIN SERPL-MCNC: 0.9 NG/ML (ref 0.6–1.2)
EGFRCR SERPLBLD CKD-EPI 2021: 94.3 ML/MIN/1.73
ERYTHROCYTE [DISTWIDTH] IN BLOOD BY AUTOMATED COUNT: 13 % (ref 12.3–15.4)
GLOBULIN UR ELPH-MCNC: 2 GM/DL
GLUCOSE SERPL-MCNC: 91 MG/DL (ref 65–99)
HCT VFR BLD AUTO: 38.1 % (ref 34–46.6)
HDLC SERPL-MCNC: 58 MG/DL (ref 40–60)
HGB BLD-MCNC: 12.2 G/DL (ref 12–15.9)
INR PPP: 4.7 (ref 0.91–1.09)
LDLC SERPL CALC-MCNC: 61 MG/DL (ref 0–100)
LDLC/HDLC SERPL: 1.06 {RATIO}
LEFT ATRIUM VOLUME INDEX: 54 ML/M2
MAXIMAL PREDICTED HEART RATE: 152 BPM
MCH RBC QN AUTO: 30.3 PG (ref 26.6–33)
MCHC RBC AUTO-ENTMCNC: 32 G/DL (ref 31.5–35.7)
MCV RBC AUTO: 94.8 FL (ref 79–97)
PLATELET # BLD AUTO: 149 10*3/MM3 (ref 140–450)
PMV BLD AUTO: 10.8 FL (ref 6–12)
POTASSIUM SERPL-SCNC: 4 MMOL/L (ref 3.5–5.2)
PROT SERPL-MCNC: 5.8 G/DL (ref 6–8.5)
PROTHROMBIN TIME: 56.1 SECONDS (ref 10–13.8)
RBC # BLD AUTO: 4.02 10*6/MM3 (ref 3.77–5.28)
SINUS: 2.8 CM
SODIUM SERPL-SCNC: 138 MMOL/L (ref 136–145)
STJ: 2.8 CM
STRESS TARGET HR: 129 BPM
TRIGL SERPL-MCNC: 73 MG/DL (ref 0–150)
VLDLC SERPL-MCNC: 15 MG/DL (ref 5–40)
WBC NRBC COR # BLD: 6.88 10*3/MM3 (ref 3.4–10.8)

## 2022-12-14 PROCEDURE — 36416 COLLJ CAPILLARY BLOOD SPEC: CPT

## 2022-12-14 PROCEDURE — G0463 HOSPITAL OUTPT CLINIC VISIT: HCPCS

## 2022-12-14 PROCEDURE — 99214 OFFICE O/P EST MOD 30 MIN: CPT | Performed by: NURSE PRACTITIONER

## 2022-12-14 PROCEDURE — 93306 TTE W/DOPPLER COMPLETE: CPT

## 2022-12-14 PROCEDURE — 80061 LIPID PANEL: CPT

## 2022-12-14 PROCEDURE — 93306 TTE W/DOPPLER COMPLETE: CPT | Performed by: INTERNAL MEDICINE

## 2022-12-14 PROCEDURE — 85610 PROTHROMBIN TIME: CPT

## 2022-12-14 PROCEDURE — 93000 ELECTROCARDIOGRAM COMPLETE: CPT | Performed by: NURSE PRACTITIONER

## 2022-12-14 PROCEDURE — 85027 COMPLETE CBC AUTOMATED: CPT

## 2022-12-14 PROCEDURE — 80162 ASSAY OF DIGOXIN TOTAL: CPT

## 2022-12-14 PROCEDURE — 25010000002 PERFLUTREN (DEFINITY) 8.476 MG IN SODIUM CHLORIDE (PF) 0.9 % 10 ML INJECTION: Performed by: INTERNAL MEDICINE

## 2022-12-14 PROCEDURE — 36415 COLL VENOUS BLD VENIPUNCTURE: CPT

## 2022-12-14 PROCEDURE — 80053 COMPREHEN METABOLIC PANEL: CPT

## 2022-12-14 RX ORDER — METOPROLOL SUCCINATE 25 MG/1
25 TABLET, EXTENDED RELEASE ORAL 2 TIMES DAILY
Qty: 180 TABLET | Refills: 3 | Status: SHIPPED | OUTPATIENT
Start: 2022-12-14

## 2022-12-14 RX ORDER — RIBOFLAVIN (VITAMIN B2) 100 MG
TABLET ORAL
COMMUNITY

## 2022-12-14 RX ORDER — AMOXICILLIN 500 MG/1
2000 CAPSULE ORAL SEE ADMIN INSTRUCTIONS
Qty: 12 CAPSULE | Refills: 0 | Status: SHIPPED | OUTPATIENT
Start: 2022-12-14

## 2022-12-14 RX ORDER — DIGOXIN 125 MCG
125 TABLET ORAL DAILY
Qty: 90 TABLET | Refills: 3 | Status: SHIPPED | OUTPATIENT
Start: 2022-12-14

## 2022-12-14 RX ORDER — LOSARTAN POTASSIUM 100 MG/1
100 TABLET ORAL NIGHTLY
Qty: 90 TABLET | Refills: 3 | Status: SHIPPED | OUTPATIENT
Start: 2022-12-14

## 2022-12-14 RX ORDER — ALBUTEROL SULFATE 90 UG/1
2 AEROSOL, METERED RESPIRATORY (INHALATION)
COMMUNITY
Start: 2022-12-02

## 2022-12-14 RX ADMIN — PERFLUTREN 1.5 ML: 6.52 INJECTION, SUSPENSION INTRAVENOUS at 09:15

## 2022-12-14 NOTE — SIGNIFICANT NOTE
12/14/22 1029   [id number] Score for Atrial Fibrillation Stroke Risk   Age in Years 65-74   Sex Female   CHF History Y   Hypertension History N   Stroke/TIA/Thromboembolism History N   Vascular Disease History N   Diabetes History N   [id number] Score 3

## 2022-12-14 NOTE — PROGRESS NOTES
Please call patient.  CMP looked good.  Oxygen level normal.  Strahl panel excellent.  Continue medications.  CBC normal thanks

## 2022-12-14 NOTE — TELEPHONE ENCOUNTER
Echocardiogram was stable.  EF normal, RV systolic function decreased and she is recently had possible RSV.  She denies shortness of breath.  Prosthetic mitral valve functioning normally.  Continue to monitor.  Blood work stable.  She will keep her follow-up appointment with Dr. Vargas in December 2023.

## 2022-12-14 NOTE — PROGRESS NOTES
Anticoagulation Clinic Progress Note    Anticoagulation Summary  As of 2022    INR goal:  2.5-3.5   TTR:  78.9 % (4.2 y)   INR used for dosin.7 (2022)   Warfarin maintenance plan:  5 mg every Sun, Thu; 7.5 mg all other days; Starting 2022   Weekly warfarin total:  47.5 mg   Plan last modified:  Renee Guy RPH (3/12/2021)   Next INR check:  2022   Priority:  Maintenance   Target end date:  Indefinite    Indications    Status post mitral valve replacement [Z95.2]  Warfarin anticoagulation [Z79.01]             Anticoagulation Episode Summary     INR check location:      Preferred lab:      Send INR reminders to:  [facility] [place]    Comments:        Anticoagulation Care Providers     Provider Role Specialty Phone number    Lindsey Vargas MD Referring Cardiology [phone]          Clinic Interview:  Patient Findings     Positives:  Change in health, Change in medications, Change in   diet/appetite    Negatives:  Signs/symptoms of thrombosis, Signs/symptoms of bleeding,   Laboratory test error suspected, Change in alcohol use, Change in   activity, Upcoming invasive procedure, Emergency department visit,   Upcoming dental procedure, Missed doses, Extra doses, Hospital admission,   Bruising, Other complaints    Comments:  Reports GI upset (nausea) as a result of doxycycline course,   so her provider advised her to take it once daily beginning ~22. Due   to nausea, she was only eating mashed potatoes and crackers for a couple   days; however, she indicates her appetite/food intake is returning to   normal. Reports she will finish course of antibiotic today.      Clinical Outcomes     Negatives:  Major bleeding event, Thromboembolic event,   Anticoagulation-related hospital admission, Anticoagulation-related ED   visit, Anticoagulation-related fatality    Comments:  Reports GI upset (nausea) as a result of doxycycline course,   so her provider advised her to take  it once daily beginning ~12/8/22. Due   to nausea, she was only eating mashed potatoes and crackers for a couple   days; however, she indicates her appetite/food intake is returning to   normal. Reports she will finish course of antibiotic today.        INR History:  Anticoagulation Monitoring 11/17/2022 12/5/2022 12/14/2022   INR 3.4 1.9 4.7   INR Date 11/17/2022 12/5/2022 12/14/2022   INR Goal 2.5-3.5 2.5-3.5 2.5-3.5   Trend Same Same Same   Last Week Total 47.5 mg 40 mg 47.5 mg   Next Week Total 47.5 mg 50 mg 40 mg   Sun 5 mg 5 mg 5 mg   Mon 7.5 mg 10 mg (12/5); Otherwise 7.5 mg 7.5 mg   Tue 7.5 mg 7.5 mg 7.5 mg   Wed 7.5 mg 7.5 mg Hold (12/14); Otherwise 7.5 mg   Thu 5 mg 5 mg 5 mg   Fri 7.5 mg 7.5 mg 7.5 mg   Sat 7.5 mg 7.5 mg 7.5 mg   Visit Report - - -   Some recent data might be hidden       Plan:  1. INR is Supratherapeutic today- see above in Anticoagulation Summary.  Will instruct Yana Lehman to Change their warfarin regimen- see above in Anticoagulation Summary.  2. Follow up in 1 week  3. Patient declines warfarin refills.  4. Verbal and written information provided. To seek immediate medical attention if s/sx of bleeding develop or fall occurs. Patient expresses understanding and has no further questions at this time.    Grady Salazar, PharmD

## 2022-12-14 NOTE — PROGRESS NOTES
Mitral valve stable.  EF normal.  LA volume severely increased to moderately reduced right ventricular systolic function noted.  Patient denies shortness of breath.  She recently had COVID and then RSV.  I think we just continue to monitor.  Patient informed.

## 2022-12-14 NOTE — PROGRESS NOTES
Date of Office Visit: 2022  Encounter Provider: MONICA Wislon  Place of Service: [facility] CARDIOLOGY  Patient Name: Yana Lehman  :1955  Primary Cardiologist: Dr. Lindsey Vargas    Chief Complaint   Patient presents with   • Atrial Fibrillation   • Mitral Insufficiency   • Annual Exam   :     HPI: Yana Lehman is a 67 y.o. female who presents today for follow-up on valvular heart disease.  I have reviewed her medical records.    In , she was diagnosed with atrial fibrillation and underwent cardioversion. Echocardiogram also showed severe mitral valve stenosis and moderate mitral insufficiency.  She underwent mitral valve replacement with a 31 mm [employer] mechanical valve remains on warfarin.  Cardiac catheterization at that time showed normal coronary arteries and moderate severe pulmonary hypertension.    In , she developed recurrent atrial fibrillation.  EF was decreased to 21-25%.  Stress test normal.  In May 2019, EF improved to 46-50% and she had a normal functioning mitral valve per echo.    She presents today for follow-up visit and had a echocardiogram completed today (results pending).  She remains on warfarin with INRs followed at the [facility] anticoagulation clinic.  Last INR subtherapeutic at 1.9.  She said she has had COVID earlier this fall, her sister passed away in October and she was the main caregiver, and then she possibly had RSV around .  She said she is just now starting to feel better and has a residual cough.  She had 1 episode of dizziness.  She denies chest pain, shortness of air, PND, orthopnea, edema, palpitations, syncope, or bleeding.  She is due for routine blood work today.      Past Medical History:   Diagnosis Date   • Acute bronchitis    • Acute kidney injury (HCC)     after surgery   • Acute systolic congestive heart failure (HCC) 3/5/2018   • Cachexia (HCC)    • Cardiomyopathy (HCC)     unspecified  type   • Depression    • H/O shortness of breath    • Mitral valve stenosis     severe; s/p mitral valve replacement    • Persistent atrial fibrillation (HCC)     on Toprol and warfarin   • Pneumothorax    • Pulmonary hypertension (HCC)    • Rheumatic fever    • Sinus tachycardia    • Tricuspid valve insufficiency 3/15/2018   • Warfarin anticoagulation 03/15/2018    followed by [facility]       Past Surgical History:   Procedure Laterality Date   • CARDIAC CATHETERIZATION      procedure outcome: successful   • FOOT SURGERY     • MITRAL VALVE REPLACEMENT  2015    Subhash Márquez   • MITRAL VALVE REPLACEMENT     • TONSILLECTOMY         Social History     Socioeconomic History   • Marital status: Single   Tobacco Use   • Smoking status: Former     Types: Cigarettes     Quit date: 2018     Years since quittin.8   • Smokeless tobacco: Never   Substance and Sexual Activity   • Alcohol use: No     Comment: caffeine use: half and half 3 cups daily   • Drug use: No       Family History   Problem Relation Age of Onset   • Heart failure Mother         congestive   • Cancer Father    • Atrial fibrillation Sister    • Hypertension Sister    • Atrial fibrillation Brother    • Heart disease Brother         cardiac pacemaker   • Hypertension Brother        The following portion of the patient's history were reviewed and updated as appropriate: past medical history, past surgical history, past social history, past family history, allergies, current medications, and problem list.    Review of Systems   Constitutional: Negative.   Cardiovascular: Negative.    Respiratory: Positive for cough.    Hematologic/Lymphatic: Negative.    Neurological: Negative.        No Known Allergies      Current Outpatient Medications:   •  albuterol sulfate  (90 Base) MCG/ACT inhaler, Inhale 2 puffs., Disp: , Rfl:   •  ascorbic acid (VITAMIN C) 100 MG tablet, Take  by mouth., Disp: , Rfl:   •  Biotin 77362 MCG tablet, Take 1 tablet  "by mouth Daily., Disp: , Rfl:   •  Cholecalciferol (VITAMIN D-3) 125 MCG (5000 UT) tablet, Take 1 tablet by mouth Daily., Disp: , Rfl:   •  digoxin (LANOXIN) 125 MCG tablet, Take 1 tablet by mouth Daily., Disp: 90 tablet, Rfl: 3  •  losartan (COZAAR) 100 MG tablet, Take 1 tablet by mouth Every Night., Disp: 90 tablet, Rfl: 3  •  metoprolol succinate XL (TOPROL-XL) 25 MG 24 hr tablet, Take 1 tablet by mouth 2 (Two) Times a Day., Disp: 180 tablet, Rfl: 3  •  VITAMIN E PO, Take  by mouth., Disp: , Rfl:   •  warfarin (COUMADIN) 5 MG tablet, Take one tablet (5 mg) by mouth on Sun and Thurs and take one and one-half tablets (7.5 mg) by mouth all other days or as directed., Disp: 125 tablet, Rfl: 1  •  Zinc Sulfate 66 MG tablet, Take  by mouth., Disp: , Rfl:   •  amoxicillin (AMOXIL) 500 MG capsule, Take 4 capsules by mouth See Admin Instructions. 4 capsules 1 hour prior to procedure, Disp: 12 capsule, Rfl: 0  •  simvastatin (ZOCOR) 10 MG tablet, Take 10 mg by mouth Daily., Disp: , Rfl:   No current facility-administered medications for this visit.         Objective:     Vitals:    12/14/22 0938   BP: 130/70   BP Location: Left arm   Patient Position: Sitting   Cuff Size: Adult   Pulse: 73   SpO2: 99%   Weight: 48.8 kg (107 lb 9.6 oz)   Height: 157.5 cm (62\")     Body mass index is 19.68 kg/m².    PHYSICAL EXAM:    Vitals Reviewed.   General Appearance: No acute distress, well developed and well nourished.  Thin.  Eyes: Conjunctiva and lids: No erythema, swelling, or discharge. Sclera non-icteric. Glasses.   HENT: Atraumatic, normocephalic. External eyes, ears, and nose normal. No hearing loss noted. Mucous membranes normal. Lips not cyanotic. Neck supple with no tenderness. Wearing mask.   Respiratory: No signs of respiratory distress. Respiration rhythm and depth normal.   Clear to auscultation. No rales, crackles, rhonchi, or wheezing auscultated.   Cardiovascular:  Jugular Venous Pressure: Normal  Heart Rate and " Rhythm: Irregular, irregular.  Heart Sounds: Mechanical mitral valve click.  Murmurs: Grade 2/6 apical murmur present.  Lower Extremities: No edema noted.  Gastrointestinal:  Abdomen soft, non-distended, non-tender.    Musculoskeletal: Normal movement of extremities.  Skin and Nails: General appearance normal. No pallor, cyanosis, diaphoresis. Skin temperature normal. No clubbing of fingernails.   Psychiatric: Patient alert and oriented to person, place, and time. Speech and behavior appropriate. Normal mood and affect.       ECG 12 Lead    Date/Time: 12/14/2022 9:40 AM  Performed by: Juanita Akins APRN  Authorized by: Juanita Akins APRN   Comparison: compared with previous ECG from 12/8/2021  Similar to previous ECG  Rhythm: atrial fibrillation  Rate: normal  BPM: 73  Conduction: conduction normal  ST Segments: ST segments normal  T Waves: T waves normal  QRS axis: normal    Clinical impression: abnormal EKG  Comments: RSR V1 and V2 prime              Assessment:       Diagnosis Plan   1. Permanent atrial fibrillation (HCC)  CBC (No Diff)    Comprehensive Metabolic Panel    Digoxin Level    Lipid Panel      2. Dilated cardiomyopathy (HCC)  Lipid Panel      3. Nonrheumatic mitral valve regurgitation        4. Non-rheumatic mitral valve stenosis        5. Status post mitral valve replacement        6. Pulmonary hypertension (HCC)        7. Mixed hyperlipidemia  Lipid Panel             Plan:       1.  Permanent Atrial Fibrillation: Heart rate well controlled on metoprolol and digoxin.  Repeat digoxin level, CBC, and CMP.  Continue warfarin for stroke prevention and INRs are followed at the [facility] anticoagulation clinic.  Denies bleeding.    2.  Nonischemic Cardiomyopathy: LVEF 46-50% per echocardiogram April 2019.  NYHA class I.  Echocardiogram from today pending.    3-5.  Status post mechanical mitral valve replacement in 2015.  Echocardiogram completed today and the results are pending.  Continue SBE  prophylaxis with amoxicillin.    6.  Mild pulmonary hypertension noted on echocardiogram in 2021.    7.  Hyperlipidemia: On simvastatin.  Recheck lipid panel today.    8.  I recommend a 1 year follow-up visit with Dr. Vargas.    As always, it has been a pleasure to participate in your patient's care. Thank you.         Sincerely,         MONICA Sanchez  [facility]      · Dictated utilizing Dragon Dictation  · COVID-19 Precautions - Patient was compliant in wearing a mask. When I saw the patient, I used appropriate personal protective equipment (PPE) including mask and eye shield (standard procedure).  Additionally, I used gown and gloves if indicated.  Hand hygiene was completed before and after seeing the patient.  · I spent 30 minutes reviewing her medical records/testing/previous office notes/labs, face-to-face interaction with patient, physical examination, formulating the plan of care, and discussion of plan of care with patient.

## 2022-12-15 ENCOUNTER — TELEPHONE (OUTPATIENT)
Dept: CARDIOLOGY | Facility: CLINIC | Age: 67
End: 2022-12-15

## 2022-12-15 NOTE — TELEPHONE ENCOUNTER
Left voicemail for Yana Lehman requesting callback.    Triage:  When reviewing Juanita's result note, patient had lipid panel and digoxin drawn.             Thank you,  Fidelia Oro RN  Triage Nurse [facility]

## 2022-12-15 NOTE — TELEPHONE ENCOUNTER
Notified patient of results and recommendations to continue regimen, patient verbalizes understanding.    Li Lucia RN  [facility] Triage  12/15/22 11:29 EST

## 2022-12-15 NOTE — TELEPHONE ENCOUNTER
----- Message from MONICA Novak sent at 12/14/2022  4:19 PM EST -----  Please call patient.  CMP looked good.  Oxygen level normal.  Strahl panel excellent.  Continue medications.  CBC normal thanks

## 2022-12-22 ENCOUNTER — ANTICOAGULATION VISIT (OUTPATIENT)
Dept: PHARMACY | Facility: HOSPITAL | Age: 67
End: 2022-12-22

## 2022-12-22 DIAGNOSIS — Z95.2 STATUS POST MITRAL VALVE REPLACEMENT: Primary | ICD-10-CM

## 2022-12-22 DIAGNOSIS — Z79.01 WARFARIN ANTICOAGULATION: ICD-10-CM

## 2022-12-22 LAB
INR PPP: 4 (ref 0.91–1.09)
PROTHROMBIN TIME: 48 SECONDS (ref 10–13.8)

## 2022-12-22 PROCEDURE — 36416 COLLJ CAPILLARY BLOOD SPEC: CPT

## 2022-12-22 PROCEDURE — 85610 PROTHROMBIN TIME: CPT

## 2022-12-22 PROCEDURE — G0463 HOSPITAL OUTPT CLINIC VISIT: HCPCS

## 2022-12-22 RX ORDER — WARFARIN SODIUM 5 MG/1
TABLET ORAL
Qty: 125 TABLET | Refills: 1 | Status: SHIPPED | OUTPATIENT
Start: 2022-12-22 | End: 2023-03-17 | Stop reason: SDUPTHER

## 2022-12-22 NOTE — PROGRESS NOTES
Anticoagulation Clinic Progress Note    Anticoagulation Summary  As of 2022    INR goal:  2.5-3.5   TTR:  78.5 % (4.2 y)   INR used for dosin.0 (2022)   Warfarin maintenance plan:  5 mg every Sun, Thu; 7.5 mg all other days; Starting 2022   Weekly warfarin total:  47.5 mg   Plan last modified:  Renee Guy RPH (3/12/2021)   Next INR check:  2023   Priority:  Maintenance   Target end date:  Indefinite    Indications    Status post mitral valve replacement [Z95.2]  Warfarin anticoagulation [Z79.01]             Anticoagulation Episode Summary     INR check location:      Preferred lab:      Send INR reminders to:  [facility]    Comments:        Anticoagulation Care Providers     Provider Role Specialty Phone number    Lindsey Vargas MD Referring Cardiology [phone]          Clinic Interview:  Patient Findings     Negatives:  Signs/symptoms of thrombosis, Signs/symptoms of bleeding,   Laboratory test error suspected, Change in health, Change in alcohol use,   Change in activity, Upcoming invasive procedure, Emergency department   visit, Upcoming dental procedure, Missed doses, Extra doses, Change in   medications, Change in diet/appetite, Hospital admission, Bruising, Other   complaints      Clinical Outcomes     Negatives:  Major bleeding event, Thromboembolic event,   Anticoagulation-related hospital admission, Anticoagulation-related ED   visit, Anticoagulation-related fatality        INR History:  Anticoagulation Monitoring 2022   INR 1.9 4.7 4.0   INR Date 2022   INR Goal 2.5-3.5 2.5-3.5 2.5-3.5   Trend Same Same Same   Last Week Total 40 mg 47.5 mg 47.5 mg   Next Week Total 50 mg 40 mg 42.5 mg   Sun 5 mg 5 mg 5 mg   Mon 10 mg (); Otherwise 7.5 mg 7.5 mg 7.5 mg   Tue 7.5 mg 7.5 mg 7.5 mg   Wed 7.5 mg Hold (); Otherwise 7.5 mg 7.5 mg   Thu 5 mg 5 mg Hold (); Otherwise 5 mg   Fri 7.5 mg 7.5  mg 7.5 mg   Sat 7.5 mg 7.5 mg 7.5 mg   Visit Report - - -   Some recent data might be hidden       Plan:  1. INR is Supratherapeutic today- see above in Anticoagulation Summary.  Will instruct Yana Lehman to Continue their warfarin regimen- see above in Anticoagulation Summary.  2. Follow up in 2 weeks  3. Patient desires warfarin refills.  4. Verbal and written information provided. Patient expresses understanding and has no further questions at this time.    Kay Mcclure, Pharmacy Intern

## 2022-12-22 NOTE — PROGRESS NOTES
I have supervised and reviewed the notes, assessments, and/or procedures performed by our Pharmacy Intern. The documented assessment and plan were developed cooperatively, and the plan was implemented in my presence. I concur with the documentation of this patient encounter.    Claire Noble, PharmD

## 2023-01-06 ENCOUNTER — ANTICOAGULATION VISIT (OUTPATIENT)
Dept: PHARMACY | Facility: HOSPITAL | Age: 68
End: 2023-01-06
Payer: MEDICARE

## 2023-01-06 DIAGNOSIS — Z95.2 STATUS POST MITRAL VALVE REPLACEMENT: Primary | ICD-10-CM

## 2023-01-06 DIAGNOSIS — Z79.01 WARFARIN ANTICOAGULATION: ICD-10-CM

## 2023-01-06 LAB
INR PPP: 3.8 (ref 0.91–1.09)
PROTHROMBIN TIME: 45.9 SECONDS (ref 10–13.8)

## 2023-01-06 PROCEDURE — 36416 COLLJ CAPILLARY BLOOD SPEC: CPT

## 2023-01-06 PROCEDURE — 85610 PROTHROMBIN TIME: CPT

## 2023-01-06 PROCEDURE — G0463 HOSPITAL OUTPT CLINIC VISIT: HCPCS

## 2023-01-06 NOTE — PROGRESS NOTES
Anticoagulation Clinic Progress Note    Anticoagulation Summary  As of 1/6/2023    INR goal:  2.5-3.5   TTR:  77.7 % (4.3 y)   INR used for dosing:  3.8 (1/6/2023)   Warfarin maintenance plan:  5 mg every Sun, Thu; 7.5 mg all other days; Starting 1/6/2023   Weekly warfarin total:  47.5 mg   Plan last modified:  Renee Guy RPH (3/12/2021)   Next INR check:  1/20/2023   Priority:  Maintenance   Target end date:  Indefinite    Indications    Status post mitral valve replacement [Z95.2]  Warfarin anticoagulation [Z79.01]             Anticoagulation Episode Summary     INR check location:      Preferred lab:      Send INR reminders to:  [facility] [place]    Comments:        Anticoagulation Care Providers     Provider Role Specialty Phone number    Lindsey Vargas MD Referring Cardiology [phone]          Clinic Interview:  Patient Findings     Positives:  Change in diet/appetite    Negatives:  Signs/symptoms of thrombosis, Signs/symptoms of bleeding,   Laboratory test error suspected, Change in health, Change in alcohol use,   Change in activity, Upcoming invasive procedure, Emergency department   visit, Upcoming dental procedure, Missed doses, Extra doses, Change in   medications, Hospital admission, Bruising, Other complaints    Comments:  Reports she has had much less vit k in diet than usual due to   the recent holidays. She plans to increase her vit k intake back to   normal.       Clinical Outcomes     Negatives:  Major bleeding event, Thromboembolic event,   Anticoagulation-related hospital admission, Anticoagulation-related ED   visit, Anticoagulation-related fatality    Comments:  Reports she has had much less vit k in diet than usual due to   the recent holidays. She plans to increase her vit k intake back to   normal.         INR History:  Anticoagulation Monitoring 12/14/2022 12/22/2022 1/6/2023   INR 4.7 4.0 3.8   INR Date 12/14/2022 12/22/2022 1/6/2023   INR Goal 2.5-3.5 2.5-3.5  2.5-3.5   Trend Same Same Same   Last Week Total 47.5 mg 47.5 mg 47.5 mg   Next Week Total 40 mg 42.5 mg 45 mg   Sun 5 mg 5 mg 5 mg   Mon 7.5 mg 7.5 mg 7.5 mg   Tue 7.5 mg 7.5 mg 7.5 mg   Wed Hold (12/14); Otherwise 7.5 mg 7.5 mg 7.5 mg   Thu 5 mg Hold (12/22); Otherwise 5 mg 5 mg   Fri 7.5 mg 7.5 mg 5 mg (1/6); Otherwise 7.5 mg   Sat 7.5 mg 7.5 mg 7.5 mg   Visit Report - - -   Some recent data might be hidden       Plan:  1. INR is Supratherapeutic today- see above in Anticoagulation Summary.  Will instruct Yana Lehman to Change their warfarin regimen- see above in Anticoagulation Summary.  2. Follow up in 2 weeks  3. Patient declines warfarin refills.  4. Verbal and written information provided. Patient expresses understanding and has no further questions at this time.    Grady Salazar, PharmD

## 2023-01-20 ENCOUNTER — ANTICOAGULATION VISIT (OUTPATIENT)
Dept: PHARMACY | Facility: HOSPITAL | Age: 68
End: 2023-01-20
Payer: MEDICARE

## 2023-01-20 DIAGNOSIS — Z95.2 STATUS POST MITRAL VALVE REPLACEMENT: Primary | ICD-10-CM

## 2023-01-20 DIAGNOSIS — Z79.01 WARFARIN ANTICOAGULATION: ICD-10-CM

## 2023-01-20 LAB
INR PPP: 3.7 (ref 0.91–1.09)
PROTHROMBIN TIME: 43.9 SECONDS (ref 10–13.8)

## 2023-01-20 PROCEDURE — 85610 PROTHROMBIN TIME: CPT

## 2023-01-20 PROCEDURE — 36416 COLLJ CAPILLARY BLOOD SPEC: CPT

## 2023-01-20 PROCEDURE — G0463 HOSPITAL OUTPT CLINIC VISIT: HCPCS

## 2023-01-20 NOTE — PROGRESS NOTES
Anticoagulation Clinic Progress Note    Anticoagulation Summary  As of 1/20/2023    INR goal:  2.5-3.5   TTR:  77.0 % (4.3 y)   INR used for dosing:  3.7 (1/20/2023)   Warfarin maintenance plan:  5 mg every Sun, Tue, Thu; 7.5 mg all other days; Starting 1/20/2023   Weekly warfarin total:  45 mg   Plan last modified:  Nickie Landin RPH (1/20/2023)   Next INR check:  2/3/2023   Priority:  Maintenance   Target end date:  Indefinite    Indications    Status post mitral valve replacement [Z95.2]  Warfarin anticoagulation [Z79.01]             Anticoagulation Episode Summary     INR check location:      Preferred lab:      Send INR reminders to:   [facility]    Comments:        Anticoagulation Care Providers     Provider Role Specialty Phone number    Lindsey Vargas MD Referring Cardiology [phone]          Clinic Interview:  Patient Findings     Positives:  Change in health    Negatives:  Signs/symptoms of thrombosis, Signs/symptoms of bleeding,   Laboratory test error suspected, Change in alcohol use, Change in   activity, Upcoming invasive procedure, Emergency department visit,   Upcoming dental procedure, Missed doses, Extra doses, Change in   medications, Change in diet/appetite, Hospital admission, Bruising, Other   complaints    Comments:  Stomach problems over the past week       Clinical Outcomes     Negatives:  Major bleeding event, Thromboembolic event,   Anticoagulation-related hospital admission, Anticoagulation-related ED   visit, Anticoagulation-related fatality    Comments:  Stomach problems over the past week         INR History:  Anticoagulation Monitoring 12/22/2022 1/6/2023 1/20/2023   INR 4.0 3.8 3.7   INR Date 12/22/2022 1/6/2023 1/20/2023   INR Goal 2.5-3.5 2.5-3.5 2.5-3.5   Trend Same Same Down   Last Week Total 47.5 mg 47.5 mg 47.5 mg   Next Week Total 42.5 mg 45 mg 42.5 mg   Sun 5 mg 5 mg 5 mg   Mon 7.5 mg 7.5 mg 7.5 mg   Tue 7.5 mg 7.5 mg 5 mg   Wed 7.5 mg 7.5 mg  7.5 mg   Thu Hold (12/22); Otherwise 5 mg 5 mg 5 mg   Fri 7.5 mg 5 mg (1/6); Otherwise 7.5 mg 5 mg (1/20); Otherwise 7.5 mg   Sat 7.5 mg 7.5 mg 7.5 mg   Visit Report - - -   Some recent data might be hidden       Plan:  1. INR is Supratherapeutic today- see above in Anticoagulation Summary.  Will instruct Yana Lehman to Change their warfarin regimen- see above in Anticoagulation Summary.  2. Follow up in 2 weeks  3. Patient declines warfarin refills.  4. Verbal and written information provided. Patient expresses understanding and has no further questions at this time.    Nickie Landin [facility]

## 2023-02-03 ENCOUNTER — ANTICOAGULATION VISIT (OUTPATIENT)
Dept: PHARMACY | Facility: HOSPITAL | Age: 68
End: 2023-02-03
Payer: MEDICARE

## 2023-02-03 DIAGNOSIS — Z95.2 STATUS POST MITRAL VALVE REPLACEMENT: Primary | ICD-10-CM

## 2023-02-03 DIAGNOSIS — Z79.01 WARFARIN ANTICOAGULATION: ICD-10-CM

## 2023-02-03 LAB
INR PPP: 3.1 (ref 0.91–1.09)
PROTHROMBIN TIME: 37.6 SECONDS (ref 10–13.8)

## 2023-02-03 PROCEDURE — 85610 PROTHROMBIN TIME: CPT

## 2023-02-03 PROCEDURE — 36416 COLLJ CAPILLARY BLOOD SPEC: CPT

## 2023-02-03 NOTE — PROGRESS NOTES
Anticoagulation Clinic Progress Note    Anticoagulation Summary  As of 2/3/2023    INR goal:  2.5-3.5   TTR:  77.0 % (4.3 y)   INR used for dosing:  3.1 (2/3/2023)   Warfarin maintenance plan:  5 mg every Sun, Tue, Thu; 7.5 mg all other days; Starting 2/3/2023   Weekly warfarin total:  45 mg   No change documented:  Emely Greene   Plan last modified:  Nickie Landin RPH (1/20/2023)   Next INR check:  2/17/2023   Priority:  Maintenance   Target end date:  Indefinite    Indications    Status post mitral valve replacement [Z95.2]  Warfarin anticoagulation [Z79.01]             Anticoagulation Episode Summary     INR check location:      Preferred lab:      Send INR reminders to:  [facility]    Comments:        Anticoagulation Care Providers     Provider Role Specialty Phone number    Lindsey Vargas MD Referring Cardiology [phone]          Clinic Interview:  Patient Findings     Negatives:  Signs/symptoms of thrombosis, Signs/symptoms of bleeding,   Laboratory test error suspected, Change in health, Change in alcohol use,   Change in activity, Upcoming invasive procedure, Emergency department   visit, Upcoming dental procedure, Missed doses, Extra doses, Change in   medications, Change in diet/appetite, Hospital admission, Bruising, Other   complaints      Clinical Outcomes     Negatives:  Major bleeding event, Thromboembolic event,   Anticoagulation-related hospital admission, Anticoagulation-related ED   visit, Anticoagulation-related fatality        INR History:  Anticoagulation Monitoring 1/6/2023 1/20/2023 2/3/2023   INR 3.8 3.7 3.1   INR Date 1/6/2023 1/20/2023 2/3/2023   INR Goal 2.5-3.5 2.5-3.5 2.5-3.5   Trend Same Down Same   Last Week Total 47.5 mg 47.5 mg 45 mg   Next Week Total 45 mg 42.5 mg 45 mg   Sun 5 mg 5 mg 5 mg   Mon 7.5 mg 7.5 mg 7.5 mg   Tue 7.5 mg 5 mg 5 mg   Wed 7.5 mg 7.5 mg 7.5 mg   Thu 5 mg 5 mg 5 mg   Fri 5 mg (1/6); Otherwise 7.5 mg 5 mg (1/20);  Otherwise 7.5 mg 7.5 mg   Sat 7.5 mg 7.5 mg 7.5 mg   Visit Report - - -   Some recent data might be hidden       Plan:  1. INR is therapeutic today- see above in Anticoagulation Summary.   Will instruct Yana Lehman to continue their warfarin regimen- see above in Anticoagulation Summary.  2. Follow up in 2 weeks.  3. Patient declines warfarin refills.  4. Verbal and written information provided. Patient expresses understanding and has no further questions at this time.    Emely Greene

## 2023-02-17 ENCOUNTER — ANTICOAGULATION VISIT (OUTPATIENT)
Dept: PHARMACY | Facility: HOSPITAL | Age: 68
End: 2023-02-17
Payer: MEDICARE

## 2023-02-17 DIAGNOSIS — Z95.2 STATUS POST MITRAL VALVE REPLACEMENT: Primary | ICD-10-CM

## 2023-02-17 DIAGNOSIS — Z79.01 WARFARIN ANTICOAGULATION: ICD-10-CM

## 2023-02-17 LAB
INR PPP: 3.8 (ref 0.91–1.09)
PROTHROMBIN TIME: 45 SECONDS (ref 10–13.8)

## 2023-02-17 PROCEDURE — 36416 COLLJ CAPILLARY BLOOD SPEC: CPT

## 2023-02-17 PROCEDURE — 85610 PROTHROMBIN TIME: CPT

## 2023-02-17 PROCEDURE — G0463 HOSPITAL OUTPT CLINIC VISIT: HCPCS

## 2023-02-17 NOTE — PROGRESS NOTES
Anticoagulation Clinic Progress Note    Anticoagulation Summary  As of 2/17/2023    INR goal:  2.5-3.5   TTR:  76.8 % (4.4 y)   INR used for dosing:  3.8 (2/17/2023)   Warfarin maintenance plan:  5 mg every Sun, Tue, Thu; 7.5 mg all other days; Starting 2/17/2023   Weekly warfarin total:  45 mg   Plan last modified:  Nickie Landin RPH (1/20/2023)   Next INR check:  3/3/2023   Priority:  Maintenance   Target end date:  Indefinite    Indications    Status post mitral valve replacement [Z95.2]  Warfarin anticoagulation [Z79.01]             Anticoagulation Episode Summary     INR check location:      Preferred lab:      Send INR reminders to:   [facility]    Comments:        Anticoagulation Care Providers     Provider Role Specialty Phone number    Lindsey Vargas MD Referring Cardiology [phone]          Clinic Interview:  Patient Findings     Negatives:  Signs/symptoms of thrombosis, Signs/symptoms of bleeding,   Laboratory test error suspected, Change in health, Change in alcohol use,   Change in activity, Upcoming invasive procedure, Emergency department   visit, Upcoming dental procedure, Missed doses, Extra doses, Change in   medications, Change in diet/appetite, Hospital admission, Bruising, Other   complaints    Comments:  Still having some stomach issues since taking the doxycycline,   had some cranberry/grape juice yesterday.       Clinical Outcomes     Negatives:  Major bleeding event, Thromboembolic event,   Anticoagulation-related hospital admission, Anticoagulation-related ED   visit, Anticoagulation-related fatality    Comments:  Still having some stomach issues since taking the doxycycline,   had some cranberry/grape juice yesterday.         INR History:  Anticoagulation Monitoring 1/20/2023 2/3/2023 2/17/2023   INR 3.7 3.1 3.8   INR Date 1/20/2023 2/3/2023 2/17/2023   INR Goal 2.5-3.5 2.5-3.5 2.5-3.5   Trend Down Same Same   Last Week Total 47.5 mg 45 mg 45 mg   Next Week  Total 42.5 mg 45 mg 42.5 mg   Sun 5 mg 5 mg 5 mg   Mon 7.5 mg 7.5 mg 7.5 mg   Tue 5 mg 5 mg 5 mg   Wed 7.5 mg 7.5 mg 7.5 mg   Thu 5 mg 5 mg 5 mg   Fri 5 mg (1/20); Otherwise 7.5 mg 7.5 mg 5 mg (2/17); Otherwise 7.5 mg   Sat 7.5 mg 7.5 mg 7.5 mg   Visit Report - - -   Some recent data might be hidden       Plan:  1. INR is Supratherapeutic today- see above in Anticoagulation Summary.  Will instruct Yana Lehman to Change their warfarin regimen- see above in Anticoagulation Summary.  2. Follow up in 2 weeks  3. Patient declines warfarin refills.  4. Verbal and written information provided. Patient expresses understanding and has no further questions at this time.    Claire Noble, PharmD

## 2023-03-03 ENCOUNTER — ANTICOAGULATION VISIT (OUTPATIENT)
Dept: PHARMACY | Facility: HOSPITAL | Age: 68
End: 2023-03-03
Payer: MEDICARE

## 2023-03-03 DIAGNOSIS — Z79.01 WARFARIN ANTICOAGULATION: ICD-10-CM

## 2023-03-03 DIAGNOSIS — Z95.2 STATUS POST MITRAL VALVE REPLACEMENT: Primary | ICD-10-CM

## 2023-03-03 LAB
INR PPP: 3.6 (ref 0.91–1.09)
PROTHROMBIN TIME: 42.6 SECONDS (ref 10–13.8)

## 2023-03-03 PROCEDURE — 85610 PROTHROMBIN TIME: CPT

## 2023-03-03 PROCEDURE — 36416 COLLJ CAPILLARY BLOOD SPEC: CPT

## 2023-03-03 PROCEDURE — G0463 HOSPITAL OUTPT CLINIC VISIT: HCPCS

## 2023-03-03 NOTE — PROGRESS NOTES
Anticoagulation Clinic Progress Note    Anticoagulation Summary  As of 3/3/2023    INR goal:  2.5-3.5   TTR:  76.1 % (4.4 y)   INR used for dosing:  3.6 (3/3/2023)   Warfarin maintenance plan:  7.5 mg every Mon, Wed, Fri; 5 mg all other days; Starting 3/3/2023   Weekly warfarin total:  42.5 mg   Plan last modified:  Haley Echols, PharmD (3/3/2023)   Next INR check:  3/17/2023   Priority:  Maintenance   Target end date:  Indefinite    Indications    Status post mitral valve replacement [Z95.2]  Warfarin anticoagulation [Z79.01]             Anticoagulation Episode Summary     INR check location:      Preferred lab:      Send INR reminders to:  [facility]    Comments:        Anticoagulation Care Providers     Provider Role Specialty Phone number    Lindsey Vargas MD Referring Cardiology [phone]          Clinic Interview:  Patient Findings     Negatives:  Signs/symptoms of thrombosis, Signs/symptoms of bleeding,   Laboratory test error suspected, Change in health, Change in alcohol use,   Change in activity, Upcoming invasive procedure, Emergency department   visit, Upcoming dental procedure, Missed doses, Extra doses, Change in   medications, Change in diet/appetite, Hospital admission, Bruising, Other   complaints      Clinical Outcomes     Negatives:  Major bleeding event, Thromboembolic event,   Anticoagulation-related hospital admission, Anticoagulation-related ED   visit, Anticoagulation-related fatality        INR History:  Anticoagulation Monitoring 2/3/2023 2/17/2023 3/3/2023   INR 3.1 3.8 3.6   INR Date 2/3/2023 2/17/2023 3/3/2023   INR Goal 2.5-3.5 2.5-3.5 2.5-3.5   Trend Same Same Down   Last Week Total 45 mg 45 mg 45 mg   Next Week Total 45 mg 42.5 mg 42.5 mg   Sun 5 mg 5 mg 5 mg   Mon 7.5 mg 7.5 mg 7.5 mg   Tue 5 mg 5 mg 5 mg   Wed 7.5 mg 7.5 mg 7.5 mg   Thu 5 mg 5 mg 5 mg   Fri 7.5 mg 5 mg (2/17); Otherwise 7.5 mg 7.5 mg   Sat 7.5 mg 7.5 mg 5 mg   Visit Report - - -    Some recent data might be hidden       Plan:  1. INR is Supratherapeutic today- see above in Anticoagulation Summary.  Will instruct Yana Lehman to Change their warfarin regimen- see above in Anticoagulation Summary.  2. Follow up in 2 weeks  3. Patient declines warfarin refills.  4. Verbal and written information provided. Patient expresses understanding and has no further questions at this time.    Haley Echols, PharmD

## 2023-03-17 ENCOUNTER — ANTICOAGULATION VISIT (OUTPATIENT)
Dept: PHARMACY | Facility: HOSPITAL | Age: 68
End: 2023-03-17
Payer: MEDICARE

## 2023-03-17 DIAGNOSIS — Z79.01 WARFARIN ANTICOAGULATION: ICD-10-CM

## 2023-03-17 DIAGNOSIS — Z95.2 STATUS POST MITRAL VALVE REPLACEMENT: Primary | ICD-10-CM

## 2023-03-17 LAB
INR PPP: 3.4 (ref 0.91–1.09)
PROTHROMBIN TIME: 41.3 SECONDS (ref 10–13.8)

## 2023-03-17 PROCEDURE — 36416 COLLJ CAPILLARY BLOOD SPEC: CPT

## 2023-03-17 PROCEDURE — 85610 PROTHROMBIN TIME: CPT

## 2023-03-17 RX ORDER — WARFARIN SODIUM 5 MG/1
TABLET ORAL
Qty: 125 TABLET | Refills: 1 | Status: SHIPPED | OUTPATIENT
Start: 2023-03-17

## 2023-03-17 NOTE — PROGRESS NOTES
I have supervised and reviewed the notes, assessments, and/or procedures performed by our Pharmacy Resident. I concur with the documentation of this patient encounter.    Grady Salazar, PharmD

## 2023-03-17 NOTE — PROGRESS NOTES
Anticoagulation Clinic Progress Note    Anticoagulation Summary  As of 3/17/2023    INR goal:  2.5-3.5   TTR:  75.9 % (4.5 y)   INR used for dosing:  3.4 (3/17/2023)   Warfarin maintenance plan:  7.5 mg every Mon, Wed, Fri; 5 mg all other days; Starting 3/17/2023   Weekly warfarin total:  42.5 mg   No change documented:  Haley Echols, PharmD   Plan last modified:  Haley Echols, PharmD (3/3/2023)   Next INR check:  4/14/2023   Priority:  Maintenance   Target end date:  Indefinite    Indications    Status post mitral valve replacement [Z95.2]  Warfarin anticoagulation [Z79.01]             Anticoagulation Episode Summary     INR check location:      Preferred lab:      Send INR reminders to:  [facility]    Comments:        Anticoagulation Care Providers     Provider Role Specialty Phone number    Lindsey Vargas MD Referring Cardiology [phone]          Clinic Interview:  Patient Findings     Negatives:  Signs/symptoms of thrombosis, Signs/symptoms of bleeding,   Laboratory test error suspected, Change in health, Change in alcohol use,   Change in activity, Upcoming invasive procedure, Emergency department   visit, Upcoming dental procedure, Missed doses, Extra doses, Change in   medications, Change in diet/appetite, Hospital admission, Bruising, Other   complaints      Clinical Outcomes     Negatives:  Major bleeding event, Thromboembolic event,   Anticoagulation-related hospital admission, Anticoagulation-related ED   visit, Anticoagulation-related fatality        INR History:  Anticoagulation Monitoring 2/17/2023 3/3/2023 3/17/2023   INR 3.8 3.6 3.4   INR Date 2/17/2023 3/3/2023 3/17/2023   INR Goal 2.5-3.5 2.5-3.5 2.5-3.5   Trend Same Down Same   Last Week Total 45 mg 45 mg 42.5 mg   Next Week Total 42.5 mg 42.5 mg 42.5 mg   Sun 5 mg 5 mg 5 mg   Mon 7.5 mg 7.5 mg 7.5 mg   Tue 5 mg 5 mg 5 mg   Wed 7.5 mg 7.5 mg 7.5 mg   Thu 5 mg 5 mg 5 mg   Fri 5 mg (2/17); Otherwise 7.5 mg 7.5  mg 7.5 mg   Sat 7.5 mg 5 mg 5 mg   Visit Report - - -   Some recent data might be hidden       Plan:  1. INR is Therapeutic today- see above in Anticoagulation Summary.  Will instruct Yana Lehman to Continue their warfarin regimen- see above in Anticoagulation Summary.  2. Follow up in 4 weeks  3. Patient declines warfarin refills.  4. Verbal and written information provided. Patient expresses understanding and has no further questions at this time.    Haley Echols, PharmD

## 2023-04-14 ENCOUNTER — TELEPHONE (OUTPATIENT)
Dept: CARDIOLOGY | Facility: CLINIC | Age: 68
End: 2023-04-14
Payer: MEDICARE

## 2023-04-14 ENCOUNTER — ANTICOAGULATION VISIT (OUTPATIENT)
Dept: PHARMACY | Facility: HOSPITAL | Age: 68
End: 2023-04-14
Payer: MEDICARE

## 2023-04-14 DIAGNOSIS — Z95.2 STATUS POST MITRAL VALVE REPLACEMENT: Primary | ICD-10-CM

## 2023-04-14 DIAGNOSIS — Z79.01 WARFARIN ANTICOAGULATION: ICD-10-CM

## 2023-04-14 LAB
INR PPP: 3.6 (ref 0.91–1.09)
PROTHROMBIN TIME: 43.4 SECONDS (ref 10–13.8)

## 2023-04-14 PROCEDURE — 36416 COLLJ CAPILLARY BLOOD SPEC: CPT

## 2023-04-14 PROCEDURE — 85610 PROTHROMBIN TIME: CPT

## 2023-04-14 NOTE — TELEPHONE ENCOUNTER
I received a perioperative cardiac risk assessment form for patient having upcoming colonoscopy/EGD scheduled on 4/20/2023.  They are requesting to hold the warfarin 5 days before surgery.  She is status post mechanical valve replacement.    Please call patient to see if she is having this completed for screening purposes or if she is having an acute GI problem.  Thank you.

## 2023-04-14 NOTE — PROGRESS NOTES
Anticoagulation Clinic Progress Note    Anticoagulation Summary  As of 4/14/2023    INR goal:  2.5-3.5   TTR:  75.4 % (4.5 y)   INR used for dosing:  3.6 (4/14/2023)   Warfarin maintenance plan:  7.5 mg every Mon, Wed, Fri; 5 mg all other days; Starting 4/14/2023   Weekly warfarin total:  42.5 mg   No change documented:  Emely Greene   Plan last modified:  Haley Echols, PharmD (3/3/2023)   Next INR check:  5/12/2023   Priority:  Maintenance   Target end date:  Indefinite    Indications    Status post mitral valve replacement [Z95.2]  Warfarin anticoagulation [Z79.01]             Anticoagulation Episode Summary     INR check location:      Preferred lab:      Send INR reminders to:  [facility]    Comments:        Anticoagulation Care Providers     Provider Role Specialty Phone number    Lindsey Vargas MD Referring Cardiology [phone]          Clinic Interview:  Patient Findings     Negatives:  Signs/symptoms of thrombosis, Signs/symptoms of bleeding,   Laboratory test error suspected, Change in health, Change in alcohol use,   Change in activity, Upcoming invasive procedure, Emergency department   visit, Upcoming dental procedure, Missed doses, Extra doses, Change in   medications, Change in diet/appetite, Hospital admission, Bruising, Other   complaints      Clinical Outcomes     Negatives:  Major bleeding event, Thromboembolic event,   Anticoagulation-related hospital admission, Anticoagulation-related ED   visit, Anticoagulation-related fatality        INR History:      1/6/2023     9:00 AM 1/20/2023     9:00 AM 2/3/2023     9:15 AM 2/17/2023     9:45 AM 3/3/2023     9:45 AM 3/17/2023     9:45 AM 4/14/2023     9:30 AM   Anticoagulation Monitoring   INR 3.8 3.7 3.1 3.8 3.6 3.4 3.6   INR Date 1/6/2023 1/20/2023 2/3/2023 2/17/2023 3/3/2023 3/17/2023 4/14/2023   INR Goal 2.5-3.5 2.5-3.5 2.5-3.5 2.5-3.5 2.5-3.5 2.5-3.5 2.5-3.5   Trend Same Down Same Same Down Same Same    Last Week Total 47.5 mg 47.5 mg 45 mg 45 mg 45 mg 42.5 mg 42.5 mg   Next Week Total 45 mg 42.5 mg 45 mg 42.5 mg 42.5 mg 42.5 mg 42.5 mg   Sun 5 mg 5 mg 5 mg 5 mg 5 mg 5 mg 5 mg   Mon 7.5 mg 7.5 mg 7.5 mg 7.5 mg 7.5 mg 7.5 mg 7.5 mg   Tue 7.5 mg 5 mg 5 mg 5 mg 5 mg 5 mg 5 mg   Wed 7.5 mg 7.5 mg 7.5 mg 7.5 mg 7.5 mg 7.5 mg 7.5 mg   Thu 5 mg 5 mg 5 mg 5 mg 5 mg 5 mg 5 mg   Fri 5 mg (1/6); Otherwise 7.5 mg 5 mg (1/20); Otherwise 7.5 mg 7.5 mg 5 mg (2/17); Otherwise 7.5 mg 7.5 mg 7.5 mg 7.5 mg   Sat 7.5 mg 7.5 mg 7.5 mg 7.5 mg 5 mg 5 mg 5 mg       Plan:  1. INR is out of range- see above in Anticoagulation Summary.   Will instruct Yana Lehman to Continue  their warfarin regimen- see above in Anticoagulation Summary.  2. Follow up in 4 weeks.   3. Patient declines warfarin refills.  4. Verbal and written information provided. Patient expresses understanding and has no further questions at this time.    Emely Greene

## 2023-04-17 RX ORDER — ENOXAPARIN SODIUM 100 MG/ML
INJECTION SUBCUTANEOUS
Qty: 12 ML | Refills: 0 | Status: SHIPPED | OUTPATIENT
Start: 2023-04-17

## 2023-04-17 NOTE — TELEPHONE ENCOUNTER
This patient is followed in the AC clinic; she's on warfarin for history of MVR. She is having a colonoscopy on 4/20/23; can you arrange for her to have LMW bridging for this.    Thanks!  MONICA Barber

## 2023-04-17 NOTE — PATIENT INSTRUCTIONS
BRIDGING INSTRUCTIONS FOR PROCEDURE ON 5/25/23:   (To be reviewed at 5/12/23 visit)      5/19/23: last dose of warfarin  5/20/23: no warfarin, no enoxaparin  5/21/23: no warfarin, no enoxaparin  5/22/23: no warfarin, START enoxaparin 50 mg AM and PM   5/23/23: no warfarin, enoxaparin 50 mg AM and PM   5/24/23: no warfarin, enoxaparin 50 mg AM ONLY **(must be 24 hours prior to procedure)  5/25/23: PROCEDURE (if allowed to take warfarin, take usual dose)  5/26/23: Restart warfarin 10 mg, restart enoxaparin 50 mg AM and PM   5/27/23: warfarin 10 mg, enoxaparin 50 mg AM and PM   5/28/23: warfarin 5 mg, enoxaparin 50 mg AM and PM   5/29/23: warfarin 7.5mg, enoxaparin 50 mg AM and PM   5/30/23: CHECK INR (take morning enoxaparin, but hold warfarin until visit).

## 2023-04-17 NOTE — TELEPHONE ENCOUNTER
I spoke with patient via phone.  She had a positive Cologuard screening and has not been recommended to undergo colonoscopy.  She is not having any GI issues or active bleeding.  She has a mechanical mitral valve and is on warfarin.  They want her to hold her warfarin for 5 days.  She is followed in the [facility] anticoagulation clinic.    Please advise.  Thank you

## 2023-04-20 ENCOUNTER — TELEPHONE (OUTPATIENT)
Dept: CARDIOLOGY | Facility: CLINIC | Age: 68
End: 2023-04-20
Payer: MEDICARE

## 2023-04-20 NOTE — TELEPHONE ENCOUNTER
Ms. Lehman is acceptable risk to proceed with colonoscopy/EGD.  She has a mechanical heart valve and will need to be bridged with Lovenox.  The [facility] anticoagulation clinic will take care of the recommendations on Lovenox therapy.

## 2023-04-20 NOTE — TELEPHONE ENCOUNTER
Clearance letter faxed to [facility] and confirmation page received.        AR

## 2023-05-12 ENCOUNTER — ANTICOAGULATION VISIT (OUTPATIENT)
Dept: PHARMACY | Facility: HOSPITAL | Age: 68
End: 2023-05-12
Payer: MEDICARE

## 2023-05-12 DIAGNOSIS — Z79.01 WARFARIN ANTICOAGULATION: ICD-10-CM

## 2023-05-12 DIAGNOSIS — Z95.2 STATUS POST MITRAL VALVE REPLACEMENT: Primary | ICD-10-CM

## 2023-05-12 LAB
INR PPP: 4.1 (ref 0.91–1.09)
PROTHROMBIN TIME: 49.6 SECONDS (ref 10–13.8)

## 2023-05-12 PROCEDURE — G0463 HOSPITAL OUTPT CLINIC VISIT: HCPCS

## 2023-05-12 PROCEDURE — 85610 PROTHROMBIN TIME: CPT

## 2023-05-12 PROCEDURE — 36416 COLLJ CAPILLARY BLOOD SPEC: CPT

## 2023-05-12 NOTE — PATIENT INSTRUCTIONS
5/19/23: last dose of warfarin  5/20/23: no warfarin, no enoxaparin  5/21/23: no warfarin, no enoxaparin  5/22/23: no warfarin, START enoxaparin 50 mg AM and PM   5/23/23: no warfarin, enoxaparin 50 mg AM and PM   5/24/23: no warfarin, enoxaparin 50 mg AM ONLY **(must be 24 hours prior to procedure)  5/25/23: PROCEDURE (if allowed to take warfarin, take usual dose)  5/26/23: Restart warfarin 10 mg, restart enoxaparin 50 mg AM and PM   5/27/23: warfarin 10 mg, enoxaparin 50 mg AM and PM   5/28/23: warfarin 5 mg, enoxaparin 50 mg AM and PM   5/29/23: warfarin 7.5mg, enoxaparin 50 mg AM and PM   5/30/23:  warfarin 5 mg, enoxaparin 50 mg AM and PM  5/31: CHECK INR (take morning enoxaparin, but hold warfarin until visit).

## 2023-05-12 NOTE — PROGRESS NOTES
Anticoagulation Clinic Progress Note    Anticoagulation Summary  As of 2023    INR goal:  2.5-3.5   TTR:  74.2 % (4.6 y)   INR used for dosin.1 (2023)   Warfarin maintenance plan:  7.5 mg every Mon, Wed, Fri; 5 mg all other days; Starting 2023   Weekly warfarin total:  42.5 mg   Plan last modified:  Haley Echols, PharmD (3/3/2023)   Next INR check:  2023   Priority:  Maintenance   Target end date:  Indefinite    Indications    Status post mitral valve replacement [Z95.2]  Warfarin anticoagulation [Z79.01]             Anticoagulation Episode Summary     INR check location:      Preferred lab:      Send INR reminders to:   [facility]    Comments:        Anticoagulation Care Providers     Provider Role Specialty Phone number    Lindsey Vargas MD Referring Cardiology [phone]          Clinic Interview:  Patient Findings     Positives:  Upcoming invasive procedure    Negatives:  Signs/symptoms of thrombosis, Signs/symptoms of bleeding,   Laboratory test error suspected, Change in health, Change in alcohol use,   Change in activity, Emergency department visit, Upcoming dental procedure,   Missed doses, Extra doses, Change in medications, Change in diet/appetite,   Hospital admission, Bruising, Other complaints    Comments:  Colonoscopy on ; holding and bridging.        Clinical Outcomes     Negatives:  Major bleeding event, Thromboembolic event,   Anticoagulation-related hospital admission, Anticoagulation-related ED   visit, Anticoagulation-related fatality    Comments:  Colonoscopy on ; holding and bridging.          INR History:      2023     9:00 AM 2/3/2023     9:15 AM 2023     9:45 AM 3/3/2023     9:45 AM 3/17/2023     9:45 AM 2023     9:30 AM 2023     8:45 AM   Anticoagulation Monitoring   INR 3.7 3.1 3.8 3.6 3.4 3.6 4.1   INR Date 2023 2/3/2023 2023 3/3/2023 3/17/2023 2023 2023   INR Goal 2.5-3.5 2.5-3.5  2.5-3.5 2.5-3.5 2.5-3.5 2.5-3.5 2.5-3.5   Trend Down Same Same Down Same Same Same   Last Week Total 47.5 mg 45 mg 45 mg 45 mg 42.5 mg 42.5 mg 42.5 mg   Next Week Total 42.5 mg 45 mg 42.5 mg 42.5 mg 42.5 mg 42.5 mg 32.5 mg   Sun 5 mg 5 mg 5 mg 5 mg 5 mg 5 mg Hold (5/21); Otherwise 5 mg   Mon 7.5 mg 7.5 mg 7.5 mg 7.5 mg 7.5 mg 7.5 mg Hold (5/22); Otherwise 7.5 mg   Tue 5 mg 5 mg 5 mg 5 mg 5 mg 5 mg Hold (5/23); Otherwise 5 mg   Wed 7.5 mg 7.5 mg 7.5 mg 7.5 mg 7.5 mg 7.5 mg 5 mg (5/17), Hold (5/24)   Thu 5 mg 5 mg 5 mg 5 mg 5 mg 5 mg Hold (5/25); Otherwise 5 mg   Fri 5 mg (1/20); Otherwise 7.5 mg 7.5 mg 5 mg (2/17); Otherwise 7.5 mg 7.5 mg 7.5 mg 7.5 mg Hold (5/12), 10 mg (5/26); Otherwise 7.5 mg   Sat 7.5 mg 7.5 mg 7.5 mg 5 mg 5 mg 5 mg Hold (5/20), 10 mg (5/27); Otherwise 5 mg       Plan:  1. INR is Supratherapeutic today- see above in Anticoagulation Summary.  Will instruct Yana Lehman to Change their warfarin regimen- see above in Anticoagulation Summary.  Hold today, then reduce to 7.5 mg Monday and Friday and take 5 mg on all other days. Begin holding warfarin for procedure on 5/20; start enoxaparin on 5/22.    2. Follow up in 2 weeks  3. Patient declines warfarin refills.  4. Verbal and written information provided. Patient expresses understanding and has no further questions at this time.    Claire Noble, PharmD

## 2023-05-25 ENCOUNTER — ANTICOAGULATION VISIT (OUTPATIENT)
Dept: PHARMACY | Facility: HOSPITAL | Age: 68
End: 2023-05-25
Payer: MEDICARE

## 2023-05-25 DIAGNOSIS — Z79.01 WARFARIN ANTICOAGULATION: ICD-10-CM

## 2023-05-25 DIAGNOSIS — Z95.2 STATUS POST MITRAL VALVE REPLACEMENT: Primary | ICD-10-CM

## 2023-05-25 NOTE — PROGRESS NOTES
Anticoagulation Clinic Progress Note    Anticoagulation Summary  As of 5/25/2023    INR goal:  2.5-3.5   TTR:  74.2 % (4.6 y)   INR used for dosing:  No new INR was available at the time of this encounter.   Warfarin maintenance plan:  7.5 mg every Mon, Wed, Fri; 5 mg all other days; Starting 5/25/2023   Weekly warfarin total:  42.5 mg   No change documented:  Claire Noble, PharmD   Plan last modified:  Haley Echols, PharmD (3/3/2023)   Next INR check:  5/31/2023   Priority:  Maintenance   Target end date:  Indefinite    Indications    Status post mitral valve replacement [Z95.2]  Warfarin anticoagulation [Z79.01]             Anticoagulation Episode Summary     INR check location:      Preferred lab:      Send INR reminders to:  [facility]    Comments:        Anticoagulation Care Providers     Provider Role Specialty Phone number    Lindsey Vargas MD Referring Cardiology [phone]          Clinic Interview:  Patient Findings     Positives:  Other complaints    Negatives:  Signs/symptoms of thrombosis, Signs/symptoms of bleeding,   Laboratory test error suspected, Change in health, Change in alcohol use,   Change in activity, Upcoming invasive procedure, Emergency department   visit, Upcoming dental procedure, Missed doses, Extra doses, Change in   medications, Change in diet/appetite, Hospital admission, Bruising    Comments:  Patient called to report that the surgeon who did her C/S had   to remove a polyp and cleared her to restart warfarin today but instructed   her to d/c the lovenox injections.  Since patient has mechanical MVR, will   aggressively boost the warfarin to achieve therapeutic INR more quickly   since not bridging.      Clinical Outcomes     Negatives:  Major bleeding event, Thromboembolic event,   Anticoagulation-related hospital admission, Anticoagulation-related ED   visit, Anticoagulation-related fatality    Comments:  Patient called to report that the  surgeon who did her C/S had   to remove a polyp and cleared her to restart warfarin today but instructed   her to d/c the lovenox injections.  Since patient has mechanical MVR, will   aggressively boost the warfarin to achieve therapeutic INR more quickly   since not bridging.        INR History:      2/3/2023     9:15 AM 2/17/2023     9:45 AM 3/3/2023     9:45 AM 3/17/2023     9:45 AM 4/14/2023     9:30 AM 5/12/2023     8:45 AM 5/25/2023     3:38 PM   Anticoagulation Monitoring   INR 3.1 3.8 3.6 3.4 3.6 4.1    INR Date 2/3/2023 2/17/2023 3/3/2023 3/17/2023 4/14/2023 5/12/2023    INR Goal 2.5-3.5 2.5-3.5 2.5-3.5 2.5-3.5 2.5-3.5 2.5-3.5 2.5-3.5   Trend Same Same Down Same Same Same Same   Last Week Total 45 mg 45 mg 45 mg 42.5 mg 42.5 mg 42.5 mg 12.5 mg   Next Week Total 45 mg 42.5 mg 42.5 mg 42.5 mg 42.5 mg 32.5 mg 52.5 mg   Sun 5 mg 5 mg 5 mg 5 mg 5 mg Hold (5/21), 7.5 mg (5/28); Otherwise 5 mg 7.5 mg (5/28)   Mon 7.5 mg 7.5 mg 7.5 mg 7.5 mg 7.5 mg Hold (5/22); Otherwise 7.5 mg 7.5 mg   Tue 5 mg 5 mg 5 mg 5 mg 5 mg Hold (5/23); Otherwise 5 mg 5 mg   Wed 7.5 mg 7.5 mg 7.5 mg 7.5 mg 7.5 mg 5 mg (5/17), Hold (5/24) -   Thu 5 mg 5 mg 5 mg 5 mg 5 mg 5 mg 5 mg   Fri 7.5 mg 5 mg (2/17); Otherwise 7.5 mg 7.5 mg 7.5 mg 7.5 mg Hold (5/12), 10 mg (5/26); Otherwise 7.5 mg 10 mg (5/26)   Sat 7.5 mg 7.5 mg 5 mg 5 mg 5 mg Hold (5/20), 10 mg (5/27); Otherwise 5 mg 10 mg (5/27)       Plan:  1. INR is NO INR today- see above in Anticoagulation Summary.   Will instruct Yana Lehman to Change their warfarin regimen- see above in Anticoagulation Summary.  2. Follow up in 6 day  3.  They have been instructed to call if any changes in medications, doses, concerns, etc. Patient expresses understanding and has no further questions at this time.    Claire Noble, PharmD

## 2023-05-31 ENCOUNTER — ANTICOAGULATION VISIT (OUTPATIENT)
Dept: PHARMACY | Facility: HOSPITAL | Age: 68
End: 2023-05-31

## 2023-05-31 DIAGNOSIS — Z79.01 WARFARIN ANTICOAGULATION: ICD-10-CM

## 2023-05-31 DIAGNOSIS — Z95.2 STATUS POST MITRAL VALVE REPLACEMENT: Primary | ICD-10-CM

## 2023-05-31 LAB
INR PPP: 2.6 (ref 0.91–1.09)
PROTHROMBIN TIME: 31 SECONDS (ref 10–13.8)

## 2023-05-31 PROCEDURE — 36416 COLLJ CAPILLARY BLOOD SPEC: CPT

## 2023-05-31 PROCEDURE — 85610 PROTHROMBIN TIME: CPT

## 2023-05-31 NOTE — PROGRESS NOTES
Anticoagulation Clinic Progress Note    Anticoagulation Summary  As of 2023    INR goal:  2.5-3.5   TTR:  74.0 % (4.7 y)   INR used for dosin.6 (2023)   Warfarin maintenance plan:  7.5 mg every Mon, Wed, Fri; 5 mg all other days; Starting 2023   Weekly warfarin total:  42.5 mg   No change documented:  Emely Greene   Plan last modified:  Haley Echols, PharmD (3/3/2023)   Next INR check:  2023   Priority:  Maintenance   Target end date:  Indefinite    Indications    Status post mitral valve replacement [Z95.2]  Warfarin anticoagulation [Z79.01]             Anticoagulation Episode Summary     INR check location:      Preferred lab:      Send INR reminders to:  [facility]    Comments:        Anticoagulation Care Providers     Provider Role Specialty Phone number    Lindsey Vargas MD Referring Cardiology [phone]          Clinic Interview:  Patient Findings     Negatives:  Signs/symptoms of thrombosis, Signs/symptoms of bleeding,   Laboratory test error suspected, Change in health, Change in alcohol use,   Change in activity, Upcoming invasive procedure, Emergency department   visit, Upcoming dental procedure, Missed doses, Extra doses, Change in   medications, Change in diet/appetite, Hospital admission, Bruising, Other   complaints      Clinical Outcomes     Negatives:  Major bleeding event, Thromboembolic event,   Anticoagulation-related hospital admission, Anticoagulation-related ED   visit, Anticoagulation-related fatality        INR History:      2023     9:45 AM 3/3/2023     9:45 AM 3/17/2023     9:45 AM 2023     9:30 AM 2023     8:45 AM 2023     3:38 PM 2023     9:00 AM   Anticoagulation Monitoring   INR 3.8 3.6 3.4 3.6 4.1  2.6   INR Date 2023 3/3/2023 3/17/2023 2023 2023  2023   INR Goal 2.5-3.5 2.5-3.5 2.5-3.5 2.5-3.5 2.5-3.5 2.5-3.5 2.5-3.5   Trend Same Down Same Same Same Same Same   Last Week  Total 45 mg 45 mg 42.5 mg 42.5 mg 42.5 mg 12.5 mg 45 mg   Next Week Total 42.5 mg 42.5 mg 42.5 mg 42.5 mg 32.5 mg 52.5 mg 42.5 mg   Sun 5 mg 5 mg 5 mg 5 mg Hold (5/21), 7.5 mg (5/28); Otherwise 5 mg 7.5 mg (5/28) 5 mg   Mon 7.5 mg 7.5 mg 7.5 mg 7.5 mg Hold (5/22); Otherwise 7.5 mg 7.5 mg 7.5 mg   Tue 5 mg 5 mg 5 mg 5 mg Hold (5/23); Otherwise 5 mg 5 mg 5 mg   Wed 7.5 mg 7.5 mg 7.5 mg 7.5 mg 5 mg (5/17), Hold (5/24) - 7.5 mg   Thu 5 mg 5 mg 5 mg 5 mg 5 mg 5 mg 5 mg   Fri 5 mg (2/17); Otherwise 7.5 mg 7.5 mg 7.5 mg 7.5 mg Hold (5/12), 10 mg (5/26); Otherwise 7.5 mg 10 mg (5/26) 7.5 mg   Sat 7.5 mg 5 mg 5 mg 5 mg Hold (5/20), 10 mg (5/27); Otherwise 5 mg 10 mg (5/27) 5 mg       Plan:  1. INR is therapeutic today- see above in Anticoagulation Summary.   Will instruct Yana Lehman to continue their warfarin regimen- see above in Anticoagulation Summary.  2. Follow up in 4 weeks.  3. Patient declines warfarin refills.  4. Verbal and written information provided. Patient expresses understanding and has no further questions at this time.    Emely Greene

## 2023-07-28 ENCOUNTER — ANTICOAGULATION VISIT (OUTPATIENT)
Dept: PHARMACY | Facility: HOSPITAL | Age: 68
End: 2023-07-28
Payer: MEDICARE

## 2023-07-28 DIAGNOSIS — Z95.2 STATUS POST MITRAL VALVE REPLACEMENT: Primary | ICD-10-CM

## 2023-07-28 DIAGNOSIS — Z79.01 WARFARIN ANTICOAGULATION: ICD-10-CM

## 2023-07-28 LAB
INR PPP: 3.2 (ref 0.91–1.09)
PROTHROMBIN TIME: 38.2 SECONDS (ref 10–13.8)

## 2023-07-28 PROCEDURE — 36416 COLLJ CAPILLARY BLOOD SPEC: CPT

## 2023-07-28 PROCEDURE — 85610 PROTHROMBIN TIME: CPT

## 2023-07-28 NOTE — PROGRESS NOTES
Anticoagulation Clinic Progress Note    Anticoagulation Summary  As of 7/28/2023      INR goal:  2.5-3.5   TTR:  74.9 % (4.8 y)   INR used for dosing:  3.2 (7/28/2023)   Warfarin maintenance plan:  7.5 mg every Mon, Wed, Fri; 5 mg all other days   Weekly warfarin total:  42.5 mg   No change documented:  Yani Lora, Pharmacy Intern   Plan last modified:  Haley Echols, PharmD (3/3/2023)   Next INR check:  8/25/2023   Priority:  Maintenance   Target end date:  Indefinite    Indications    Status post mitral valve replacement [Z95.2]  Warfarin anticoagulation [Z79.01]                 Anticoagulation Episode Summary       INR check location:      Preferred lab:      Send INR reminders to:  [facility]    Comments:            Anticoagulation Care Providers       Provider Role Specialty Phone number    Lindsey Vargas MD Referring Cardiology [phone]            Clinic Interview:  Patient Findings     Negatives:  Signs/symptoms of thrombosis, Signs/symptoms of bleeding,   Laboratory test error suspected, Change in health, Change in alcohol use,   Change in activity, Upcoming invasive procedure, Emergency department   visit, Upcoming dental procedure, Missed doses, Extra doses, Change in   medications, Change in diet/appetite, Hospital admission, Bruising, Other   complaints      Clinical Outcomes     Negatives:  Major bleeding event, Thromboembolic event,   Anticoagulation-related hospital admission, Anticoagulation-related ED   visit, Anticoagulation-related fatality        INR History:      3/17/2023     9:45 AM 4/14/2023     9:30 AM 5/12/2023     8:45 AM 5/25/2023     3:38 PM 5/31/2023     9:00 AM 6/30/2023     9:00 AM 7/28/2023     9:00 AM   Anticoagulation Monitoring   INR 3.4 3.6 4.1  2.6 2.9 3.2   INR Date 3/17/2023 4/14/2023 5/12/2023  5/31/2023 6/30/2023 7/28/2023   INR Goal 2.5-3.5 2.5-3.5 2.5-3.5 2.5-3.5 2.5-3.5 2.5-3.5 2.5-3.5   Trend Same Same Same Same Same Same Same    Last Week Total 42.5 mg 42.5 mg 42.5 mg 12.5 mg 45 mg 42.5 mg 42.5 mg   Next Week Total 42.5 mg 42.5 mg 32.5 mg 52.5 mg 42.5 mg 42.5 mg 42.5 mg   Sun 5 mg 5 mg Hold (5/21), 7.5 mg (5/28); Otherwise 5 mg 7.5 mg (5/28) 5 mg 5 mg 5 mg   Mon 7.5 mg 7.5 mg Hold (5/22); Otherwise 7.5 mg 7.5 mg 7.5 mg 7.5 mg 7.5 mg   Tue 5 mg 5 mg Hold (5/23); Otherwise 5 mg 5 mg 5 mg 5 mg 5 mg   Wed 7.5 mg 7.5 mg 5 mg (5/17), Hold (5/24) - 7.5 mg 7.5 mg 7.5 mg   Thu 5 mg 5 mg 5 mg 5 mg 5 mg 5 mg 5 mg   Fri 7.5 mg 7.5 mg Hold (5/12), 10 mg (5/26); Otherwise 7.5 mg 10 mg (5/26) 7.5 mg 7.5 mg 7.5 mg   Sat 5 mg 5 mg Hold (5/20), 10 mg (5/27); Otherwise 5 mg 10 mg (5/27) 5 mg 5 mg 5 mg       Plan:  1. INR is therapeutic today- see above in Anticoagulation Summary.   Will instruct Yana Lehman to continue their warfarin regimen- see above in Anticoagulation Summary.  2. Follow up in 4 weeks.  3. Patient declines warfarin refills.  4. Verbal and written information provided. Patient expresses understanding and has no further questions at this time.    Yani Lora, Pharmacy Intern

## 2023-08-09 ENCOUNTER — HOSPITAL ENCOUNTER (EMERGENCY)
Facility: HOSPITAL | Age: 68
Discharge: HOME | End: 2023-08-09
Attending: EMERGENCY MEDICINE
Payer: MEDICARE

## 2023-08-09 VITALS
WEIGHT: 106 LBS | RESPIRATION RATE: 16 BRPM | OXYGEN SATURATION: 96 % | HEIGHT: 62 IN | SYSTOLIC BLOOD PRESSURE: 121 MMHG | DIASTOLIC BLOOD PRESSURE: 60 MMHG | HEART RATE: 72 BPM | BODY MASS INDEX: 19.51 KG/M2 | TEMPERATURE: 97.9 F

## 2023-08-09 DIAGNOSIS — T63.481A INSECT STINGS, ACCIDENTAL OR UNINTENTIONAL, INITIAL ENCOUNTER: Primary | ICD-10-CM

## 2023-08-09 DIAGNOSIS — M79.644 FINGER PAIN, RIGHT: ICD-10-CM

## 2023-08-09 PROCEDURE — 99283 EMERGENCY DEPT VISIT LOW MDM: CPT

## 2023-08-09 RX ORDER — HYDROCODONE BITARTRATE AND ACETAMINOPHEN 5; 325 MG/1; MG/1
1 TABLET ORAL ONCE
Status: COMPLETED | OUTPATIENT
Start: 2023-08-09 | End: 2023-08-09

## 2023-08-09 RX ORDER — DIPHENHYDRAMINE HCL 25 MG
25 CAPSULE ORAL EVERY 6 HOURS PRN
Qty: 30 CAPSULE | Refills: 0 | Status: SHIPPED | OUTPATIENT
Start: 2023-08-09

## 2023-08-09 RX ADMIN — HYDROCODONE BITARTRATE AND ACETAMINOPHEN 1 TABLET: 5; 325 TABLET ORAL at 21:29

## 2023-08-10 NOTE — ED PROVIDER NOTES
" EMERGENCY DEPARTMENT ENCOUNTER    Room Number:  29/29  Date of encounter:  8/9/2023  PCP: Shannna Fine APRN  Patient Care Team:  Shannan Fine APRN as PCP - General (Nurse Practitioner)  Renee Guy LYSSA as Pharmacist  Lindsey Vargas MD as Consulting Physician (Cardiology)   Independent Historians: Patient    HPI:  Chief Complaint: Insect sting  A complete HPI/ROS/PMH/PSH/SH/FH are unobtainable due to: N/A    Chronic or social conditions impacting patient care (social determinants of health): None    Context: Yana Lehman is a 68 y.o. right-hand-dominant female with past medical history of HTN, HLD, atrial fibrillation, history of prosthetic mitral valve replacement, anticoagulated on Coumadin who arrives to the ED with complaint of insect sting to the right index finger.  Patient states that earlier today she was stung by an unknown insect on the palmar aspect of her right index finger.  Patient states that it is \"throbbing\".  Patient used a topical pain patch as well as Tylenol and ice without relief.  Patient complains of generalized swelling and redness.  Denies any other rash, no trouble breathing, no shortness of breath, no swelling of mouth, lips, or tongue.    Review of prior external notes (non-ED): Most recent anticoagulation visit on 7/28/2023    Review of prior external test results outside of this encounter: Last INR was 3.2 on 7/28/2023.    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    Non-rheumatic mitral valve stenosis 03/05/2018    Depression 03/05/2018    Warfarin anticoagulation 03/15/2018    Status post mitral valve replacement 03/15/2018    Pulmonary hypertension 03/15/2018    Tricuspid valve insufficiency 03/15/2018    Dilated cardiomyopathy 04/11/2018    Mitral valve insufficiency 04/18/2019     Resolved Ambulatory Problems     Diagnosis Date Noted    Atrial fibrillation with rapid ventricular response " 2018    Acute systolic congestive heart failure 2018    Persistent atrial fibrillation (HCC) 2018    Healthy adult 2021     Past Medical History:   Diagnosis Date    Acute bronchitis     Acute kidney injury     Cachexia     Cardiomyopathy     H/O shortness of breath     Mitral valve stenosis     Pneumothorax     Rheumatic fever     Sinus tachycardia        The patient has started, but not completed, their COVID-19 vaccination series.    PAST SURGICAL HISTORY  Past Surgical History:   Procedure Laterality Date    CARDIAC CATHETERIZATION      procedure outcome: successful    FOOT SURGERY      MITRAL VALVE REPLACEMENT  2015    Subhash Márquez    MITRAL VALVE REPLACEMENT      TONSILLECTOMY           FAMILY HISTORY  Family History   Problem Relation Age of Onset    Heart failure Mother         congestive    Cancer Father     Atrial fibrillation Sister     Hypertension Sister     Atrial fibrillation Brother     Heart disease Brother         cardiac pacemaker    Hypertension Brother          SOCIAL HISTORY  Social History     Socioeconomic History    Marital status: Single   Tobacco Use    Smoking status: Former     Types: Cigarettes     Quit date: 2018     Years since quittin.4    Smokeless tobacco: Never   Substance and Sexual Activity    Alcohol use: No     Comment: caffeine use: half and half 3 cups daily    Drug use: No         ALLERGIES  Patient has no known allergies.        REVIEW OF SYSTEMS  Review of Systems     All systems reviewed and negative except for those discussed in HPI.       PHYSICAL EXAM    I have reviewed the triage vital signs and nursing notes.    ED Triage Vitals [23]   Temp Heart Rate Resp BP SpO2   97.9 øF (36.6 øC) 80 16 -- 98 %      Temp src Heart Rate Source Patient Position BP Location FiO2 (%)   Tympanic Monitor -- -- --       Physical Exam    GENERAL: alert and oriented x 4, not distressed  HENT: normocephalic, atraumatic, no angioedema  EYES:  no scleral icterus, PERRL, EOMI  CV: irregularly irregular rhythm, regular rate, click present  RESPIRATORY: normal effort, CTAB, no wheezes or stridor  MUSCULOSKELETAL: no deformity  NEURO: alert, moves all extremities, no focal neuro deficits, follows commands  SKIN: warm, dry, no rash, mild diffuse swelling and erythema to the RIF centered at the palmar aspect of the PIP joint  Psych: Appropriate mood and affect      Nursing notes and vital signs reviewed      LAB RESULTS  No results found for this or any previous visit (from the past 24 hour(s)).    Ordered the above labs and independently reviewed and interpreted the results by me.        RADIOLOGY  No Radiology Exams Resulted Within Past 24 Hours    I ordered the above noted radiological studies.  These were independently interpreted and reviewed by me.  See dictation for official radiology interpretation.      PROCEDURES    Procedures      MEDICATIONS GIVEN IN ER    Medications   HYDROcodone-acetaminophen (NORCO) 5-325 MG per tablet 1 tablet (1 tablet Oral Given 8/9/23 2129)         PROGRESS, DATA ANALYSIS, CONSULTS, AND MEDICAL DECISION MAKING    All labs have been independently reviewed by me.  All radiology studies have been reviewed by me and discussed with radiologist dictating the report.   EKG's independently viewed and interpreted by me.  Discussion below represents my analysis of pertinent findings related to patient's condition, differential diagnosis, treatment plan and final disposition.    DDx:  Includes, but is not limited to allergic reaction, insect sting           MDM: Patient is having a mild local reaction to insect sting on the right index finger, there is no evidence for secondary infection, cellulitis, or abscess, or a systemic allergic reaction.  Will treat with symptomatic treatment and discharged home.    PPE:  The patient wore a mask and I wore a mask and all appropriate PPE throughout the entire patient encounter.      AS OF 21:52  EDT VITALS:    BP - 128/72  HR - 83  TEMP - 97.9 øF (36.6 øC) (Tympanic)  O2 SATS - 98%      DIAGNOSIS  Final diagnoses:   Insect stings, accidental or unintentional, initial encounter   Finger pain, right         DISPOSITION  DISCHARGE    Patient discharged in stable condition.    Reviewed implications of results, diagnosis, meds, responsibility to follow up, warning signs and symptoms of possible worsening, potential complications and reasons to return to ER.    Patient/Family voiced understanding of above instructions.    Discussed plan for discharge, as there is no emergent indication for admission. Patient referred to primary care provider for BP management due to today's BP. Pt/family is agreeable and understands need for follow up and repeat testing.  Pt is aware that discharge does not mean that nothing is wrong but it indicates no emergency is present that requires admission and they must continue care with follow-up as given below or physician of their choice.     FOLLOW-UP  Shannan Fine, APRN  [address]  [place] [id number] [phone]    Schedule an appointment as soon as possible for a visit            Medication List        New Prescriptions      diphenhydrAMINE 25 mg capsule  Commonly known as: BENADRYL  Take 1 capsule by mouth Every 6 (Six) Hours As Needed for Itching.     triamcinolone 0.1 % ointment  Commonly known as: KENALOG  Apply 1 application  topically to the appropriate area as directed 2 (Two) Times a Day.               Where to Get Your Medications        These medications were sent to [facility] [id number] - [place], [place] - [address] [facility] [address] ([address]  - [phone] [facility] [phone]   [address] [place] [address]      Phone: [phone]   diphenhydrAMINE 25 mg capsule  triamcinolone 0.1 % ointment           Note Disclaimer: At [facility], we believe that sharing information builds trust and better  relationships. You are receiving this note because you recently visited [facility]. It is possible you will see health information before a provider has talked with you about it. This kind of information can be easy to misunderstand. To help you fully understand what it means for your health, we urge you to discuss this note with your provider.       Preet Jones PA  08/09/23 0190

## 2023-08-10 NOTE — DISCHARGE INSTRUCTIONS
Home, rest, medicine as directed, Tylenol as needed for pain, home medicine as prescribed, follow up with PCP for recheck. Return to care with further concerns.

## 2023-08-29 ENCOUNTER — ANTICOAGULATION VISIT (OUTPATIENT)
Dept: PHARMACY | Facility: HOSPITAL | Age: 68
End: 2023-08-29
Payer: MEDICARE

## 2023-08-29 DIAGNOSIS — Z79.01 WARFARIN ANTICOAGULATION: ICD-10-CM

## 2023-08-29 DIAGNOSIS — Z95.2 STATUS POST MITRAL VALVE REPLACEMENT: Primary | ICD-10-CM

## 2023-08-29 LAB
INR PPP: 2 (ref 0.91–1.09)
PROTHROMBIN TIME: 24.3 SECONDS (ref 10–13.8)

## 2023-08-29 PROCEDURE — 85610 PROTHROMBIN TIME: CPT

## 2023-08-29 PROCEDURE — G0463 HOSPITAL OUTPT CLINIC VISIT: HCPCS

## 2023-08-29 PROCEDURE — 36416 COLLJ CAPILLARY BLOOD SPEC: CPT

## 2023-08-29 NOTE — PROGRESS NOTES
Anticoagulation Clinic Progress Note    Anticoagulation Summary  As of 2023      INR goal:  2.5-3.5   TTR:  74.6 % (4.9 y)   INR used for dosin.0 (2023)   Warfarin maintenance plan:  7.5 mg every Mon, Wed, Fri; 5 mg all other days   Weekly warfarin total:  42.5 mg   Plan last modified:  Haley Echols, PharmD (3/3/2023)   Next INR check:  9/15/2023   Priority:  Maintenance   Target end date:  Indefinite    Indications    Status post mitral valve replacement [Z95.2]  Warfarin anticoagulation [Z79.01]                 Anticoagulation Episode Summary       INR check location:      Preferred lab:      Send INR reminders to:   [facility]    Comments:            Anticoagulation Care Providers       Provider Role Specialty Phone number    Lindsey Vargas MD Referring Cardiology [phone]            Clinic Interview:  Patient Findings     Positives:  Emergency department visit, Change in diet/appetite    Negatives:  Signs/symptoms of thrombosis, Signs/symptoms of bleeding,   Laboratory test error suspected, Change in health, Change in alcohol use,   Change in activity, Upcoming invasive procedure, Upcoming dental   procedure, Missed doses, Extra doses, Change in medications, Hospital   admission, Bruising, Other complaints    Comments:  Endorses more salads with spinach this week; does not plan to   make that routine. ED visit on  for insect bite on her hand.  Given   topical steroid cream.      Clinical Outcomes     Negatives:  Major bleeding event, Thromboembolic event,   Anticoagulation-related hospital admission, Anticoagulation-related ED   visit, Anticoagulation-related fatality    Comments:  Endorses more salads with spinach this week; does not plan to   make that routine. ED visit on  for insect bite on her hand.  Given   topical steroid cream.        INR History:      2023     9:30 AM 2023     8:45 AM 2023     3:38 PM 2023     9:00 AM 2023      9:00 AM 7/28/2023     9:00 AM 8/29/2023     9:00 AM   Anticoagulation Monitoring   INR 3.6 4.1  2.6 2.9 3.2 2.0   INR Date 4/14/2023 5/12/2023 5/31/2023 6/30/2023 7/28/2023 8/29/2023   INR Goal 2.5-3.5 2.5-3.5 2.5-3.5 2.5-3.5 2.5-3.5 2.5-3.5 2.5-3.5   Trend Same Same Same Same Same Same Same   Last Week Total 42.5 mg 42.5 mg 12.5 mg 45 mg 42.5 mg 42.5 mg 42.5 mg   Next Week Total 42.5 mg 32.5 mg 52.5 mg 42.5 mg 42.5 mg 42.5 mg 45 mg   Sun 5 mg Hold (5/21), 7.5 mg (5/28); Otherwise 5 mg 7.5 mg (5/28) 5 mg 5 mg 5 mg 5 mg   Mon 7.5 mg Hold (5/22); Otherwise 7.5 mg 7.5 mg 7.5 mg 7.5 mg 7.5 mg 7.5 mg   Tue 5 mg Hold (5/23); Otherwise 5 mg 5 mg 5 mg 5 mg 5 mg 7.5 mg (8/29); Otherwise 5 mg   Wed 7.5 mg 5 mg (5/17), Hold (5/24) - 7.5 mg 7.5 mg 7.5 mg 7.5 mg   Thu 5 mg 5 mg 5 mg 5 mg 5 mg 5 mg 5 mg   Fri 7.5 mg Hold (5/12), 10 mg (5/26); Otherwise 7.5 mg 10 mg (5/26) 7.5 mg 7.5 mg 7.5 mg 7.5 mg   Sat 5 mg Hold (5/20), 10 mg (5/27); Otherwise 5 mg 10 mg (5/27) 5 mg 5 mg 5 mg 5 mg       Plan:  1. INR is Subtherapeutic today- see above in Anticoagulation Summary.  Will instruct Yana Lehman to Change their warfarin regimen- see above in Anticoagulation Summary.  2. Follow up in 2 weeks  3. Patient declines warfarin refills.  4. Verbal and written information provided. Patient expresses understanding and has no further questions at this time.    Claire Noble, PharmD

## 2023-09-14 ENCOUNTER — ANTICOAGULATION VISIT (OUTPATIENT)
Dept: PHARMACY | Facility: HOSPITAL | Age: 68
End: 2023-09-14
Payer: MEDICARE

## 2023-09-14 DIAGNOSIS — Z95.2 STATUS POST MITRAL VALVE REPLACEMENT: Primary | ICD-10-CM

## 2023-09-14 DIAGNOSIS — Z79.01 WARFARIN ANTICOAGULATION: ICD-10-CM

## 2023-09-14 LAB
INR PPP: 1.8 (ref 0.91–1.09)
PROTHROMBIN TIME: 21.7 SECONDS (ref 10–13.8)

## 2023-09-14 PROCEDURE — 85610 PROTHROMBIN TIME: CPT

## 2023-09-14 PROCEDURE — G0463 HOSPITAL OUTPT CLINIC VISIT: HCPCS

## 2023-09-14 PROCEDURE — 36416 COLLJ CAPILLARY BLOOD SPEC: CPT

## 2023-09-14 NOTE — PROGRESS NOTES
Anticoagulation Clinic Progress Note    Anticoagulation Summary  As of 2023      INR goal:  2.5-3.5   TTR:  74.0 % (4.9 y)   INR used for dosin.8 (2023)   Warfarin maintenance plan:  5 mg every Sun, Tue, Thu; 7.5 mg all other days   Weekly warfarin total:  45 mg   Plan last modified:  Nickie Landin RPH (2023)   Next INR check:  2023   Priority:  Maintenance   Target end date:  Indefinite    Indications    Status post mitral valve replacement [Z95.2]  Warfarin anticoagulation [Z79.01]                 Anticoagulation Episode Summary       INR check location:      Preferred lab:      Send INR reminders to:   [facility] [place]    Comments:            Anticoagulation Care Providers       Provider Role Specialty Phone number    Lindsey Vargas MD Referring Cardiology [phone]            Clinic Interview:  Patient Findings     Positives:  Missed doses    Negatives:  Signs/symptoms of thrombosis, Signs/symptoms of bleeding,   Laboratory test error suspected, Change in health, Change in alcohol use,   Change in activity, Upcoming invasive procedure, Emergency department   visit, Upcoming dental procedure, Extra doses, Change in medications,   Change in diet/appetite, Hospital admission, Bruising, Other complaints    Comments:  Patient reported missing a dose yesterday.       Clinical Outcomes     Negatives:  Major bleeding event, Thromboembolic event,   Anticoagulation-related hospital admission, Anticoagulation-related ED   visit, Anticoagulation-related fatality    Comments:  Patient reported missing a dose yesterday.         INR History:      2023     8:45 AM 2023     3:38 PM 2023     9:00 AM 2023     9:00 AM 2023     9:00 AM 2023     9:00 AM 2023     9:00 AM   Anticoagulation Monitoring   INR 4.1  2.6 2.9 3.2 2.0 1.8   INR Date 2023   INR Goal 2.5-3.5 2.5-3.5 2.5-3.5 2.5-3.5 2.5-3.5  2.5-3.5 2.5-3.5   Trend Same Same Same Same Same Same Up   Last Week Total 42.5 mg 12.5 mg 45 mg 42.5 mg 42.5 mg 42.5 mg 35 mg   Next Week Total 32.5 mg 52.5 mg 42.5 mg 42.5 mg 42.5 mg 45 mg 47.5 mg   Sun Hold (5/21), 7.5 mg (5/28); Otherwise 5 mg 7.5 mg (5/28) 5 mg 5 mg 5 mg 5 mg 5 mg   Mon Hold (5/22); Otherwise 7.5 mg 7.5 mg 7.5 mg 7.5 mg 7.5 mg 7.5 mg 7.5 mg   Tue Hold (5/23); Otherwise 5 mg 5 mg 5 mg 5 mg 5 mg 7.5 mg (8/29); Otherwise 5 mg 5 mg   Wed 5 mg (5/17), Hold (5/24) - 7.5 mg 7.5 mg 7.5 mg 7.5 mg 7.5 mg   Thu 5 mg 5 mg 5 mg 5 mg 5 mg 5 mg 7.5 mg (9/14); Otherwise 5 mg   Fri Hold (5/12), 10 mg (5/26); Otherwise 7.5 mg 10 mg (5/26) 7.5 mg 7.5 mg 7.5 mg 7.5 mg 7.5 mg   Sat Hold (5/20), 10 mg (5/27); Otherwise 5 mg 10 mg (5/27) 5 mg 5 mg 5 mg 5 mg 7.5 mg       Plan:  1. INR is Subtherapeutic today- see above in Anticoagulation Summary.  Will instruct Yana Lehman to increase their warfarin regimen- see above in Anticoagulation Summary.  Patient was recommended to boost today with 7.5mg.  Patient's weekly dose was increased to 5mg on Sunday, Tuesday, Thursday and 7.5mg all other days.   2. Follow up in 2 weeks  3. Patient declines warfarin refills.  4. Verbal and written information provided. Patient expresses understanding and has no further questions at this time.    Dakota Lucia, Pharmacy Intern

## 2023-09-14 NOTE — PROGRESS NOTES
I have supervised and reviewed the notes, assessments, and/or procedures performed by our Pharmacy Intern. The documented assessment and plan were developed cooperatively, and the plan was implemented in my presence. I concur with the documentation of this patient encounter.    Nickie Landin [facility]

## 2023-09-25 RX ORDER — WARFARIN SODIUM 5 MG/1
TABLET ORAL
Qty: 120 TABLET | Refills: 1 | Status: SHIPPED | OUTPATIENT
Start: 2023-09-25

## 2023-09-28 ENCOUNTER — ANTICOAGULATION VISIT (OUTPATIENT)
Dept: PHARMACY | Facility: HOSPITAL | Age: 68
End: 2023-09-28
Payer: MEDICARE

## 2023-09-28 DIAGNOSIS — Z79.01 WARFARIN ANTICOAGULATION: ICD-10-CM

## 2023-09-28 DIAGNOSIS — Z95.2 STATUS POST MITRAL VALVE REPLACEMENT: Primary | ICD-10-CM

## 2023-09-28 LAB
INR PPP: 2.3 (ref 0.91–1.09)
PROTHROMBIN TIME: 28.1 SECONDS (ref 10–13.8)

## 2023-09-28 PROCEDURE — 36416 COLLJ CAPILLARY BLOOD SPEC: CPT

## 2023-09-28 PROCEDURE — 85610 PROTHROMBIN TIME: CPT

## 2023-09-28 PROCEDURE — G0463 HOSPITAL OUTPT CLINIC VISIT: HCPCS

## 2023-09-28 NOTE — PROGRESS NOTES
I have supervised and reviewed the notes, assessments, and/or procedures performed by our Pharmacy Intern. The documented assessment and plan were developed cooperatively, and the plan was implemented in my presence. I concur with the documentation of this patient encounter.    Nickie Landin [facility]

## 2023-09-28 NOTE — PROGRESS NOTES
Anticoagulation Clinic Progress Note    Anticoagulation Summary  As of 2023      INR goal:  2.5-3.5   TTR:  73.4 % (5 y)   INR used for dosin.3 (2023)   Warfarin maintenance plan:  5 mg every Tue, Thu; 7.5 mg all other days   Weekly warfarin total:  47.5 mg   Plan last modified:  Nickie Landin RPH (2023)   Next INR check:  10/12/2023   Priority:  Maintenance   Target end date:  Indefinite    Indications    Status post mitral valve replacement [Z95.2]  Warfarin anticoagulation [Z79.01]                 Anticoagulation Episode Summary       INR check location:      Preferred lab:      Send INR reminders to:   [facility]    Comments:            Anticoagulation Care Providers       Provider Role Specialty Phone number    Lindsey Vargas MD Referring Cardiology [phone]            Clinic Interview:  Patient Findings     Positives:  Change in medications    Negatives:  Signs/symptoms of thrombosis, Signs/symptoms of bleeding,   Laboratory test error suspected, Change in health, Change in alcohol use,   Change in activity, Upcoming invasive procedure, Emergency department   visit, Upcoming dental procedure, Missed doses, Extra doses, Change in   diet/appetite, Hospital admission, Bruising, Other complaints    Comments:  Patient has a prescription for Amoxicillin and clavulanate   potassium tablets for a bacterial eye infection. Will take 1 tablet by   mouth twice a day for ten days. Plans on starting regimen today.       Clinical Outcomes     Negatives:  Major bleeding event, Thromboembolic event,   Anticoagulation-related hospital admission, Anticoagulation-related ED   visit, Anticoagulation-related fatality    Comments:  Patient has a prescription for Amoxicillin and clavulanate   potassium tablets for a bacterial eye infection. Will take 1 tablet by   mouth twice a day for ten days. Plans on starting regimen today.         INR History:      2023     3:38 PM  5/31/2023     9:00 AM 6/30/2023     9:00 AM 7/28/2023     9:00 AM 8/29/2023     9:00 AM 9/14/2023     9:00 AM 9/28/2023     9:00 AM   Anticoagulation Monitoring   INR  2.6 2.9 3.2 2.0 1.8 2.3   INR Date  5/31/2023 6/30/2023 7/28/2023 8/29/2023 9/14/2023 9/28/2023   INR Goal 2.5-3.5 2.5-3.5 2.5-3.5 2.5-3.5 2.5-3.5 2.5-3.5 2.5-3.5   Trend Same Same Same Same Same Up Up   Last Week Total 12.5 mg 45 mg 42.5 mg 42.5 mg 42.5 mg 35 mg 45 mg   Next Week Total 52.5 mg 42.5 mg 42.5 mg 42.5 mg 45 mg 47.5 mg 47.5 mg   Sun 7.5 mg (5/28) 5 mg 5 mg 5 mg 5 mg 5 mg 7.5 mg   Mon 7.5 mg 7.5 mg 7.5 mg 7.5 mg 7.5 mg 7.5 mg 7.5 mg   Tue 5 mg 5 mg 5 mg 5 mg 7.5 mg (8/29); Otherwise 5 mg 5 mg 5 mg   Wed - 7.5 mg 7.5 mg 7.5 mg 7.5 mg 7.5 mg 7.5 mg   Thu 5 mg 5 mg 5 mg 5 mg 5 mg 7.5 mg (9/14); Otherwise 5 mg 5 mg   Fri 10 mg (5/26) 7.5 mg 7.5 mg 7.5 mg 7.5 mg 7.5 mg 7.5 mg   Sat 10 mg (5/27) 5 mg 5 mg 5 mg 5 mg 7.5 mg 7.5 mg       Plan:  1. INR is Subtherapeutic today- see above in Anticoagulation Summary.  Will instruct Yana Lehman to Change their warfarin regimen- see above in Anticoagulation Summary.  Instructed patient to change warfarin dose schedule to 5mg on Tuesday + Thursday. 7.5mg all other days.  2. Follow up in 2 weeks  3. Patient declines warfarin refills.  4. Verbal and written information provided. Patient expresses understanding and has no further questions at this time.    Blanca Willis, Pharmacy Intern

## 2023-10-12 ENCOUNTER — ANTICOAGULATION VISIT (OUTPATIENT)
Dept: PHARMACY | Facility: HOSPITAL | Age: 68
End: 2023-10-12
Payer: MEDICARE

## 2023-10-12 DIAGNOSIS — Z79.01 WARFARIN ANTICOAGULATION: ICD-10-CM

## 2023-10-12 DIAGNOSIS — Z95.2 STATUS POST MITRAL VALVE REPLACEMENT: Primary | ICD-10-CM

## 2023-10-12 LAB
INR PPP: 2.6 (ref 0.91–1.09)
PROTHROMBIN TIME: 31 SECONDS (ref 10–13.8)

## 2023-10-12 PROCEDURE — 36416 COLLJ CAPILLARY BLOOD SPEC: CPT

## 2023-10-12 PROCEDURE — 85610 PROTHROMBIN TIME: CPT

## 2023-10-12 NOTE — PROGRESS NOTES
Anticoagulation Clinic Progress Note    Anticoagulation Summary  As of 10/12/2023      INR goal:  2.5-3.5   TTR:  73.1% (5 y)   INR used for dosin.6 (10/12/2023)   Warfarin maintenance plan:  5 mg every Tue, Thu; 7.5 mg all other days   Weekly warfarin total:  47.5 mg   No change documented:  Grady Salazar, PharmD   Plan last modified:  Nickie Landin RPH (2023)   Next INR check:  10/26/2023   Priority:  Maintenance   Target end date:  Indefinite    Indications    Status post mitral valve replacement [Z95.2]  Warfarin anticoagulation [Z79.01]                 Anticoagulation Episode Summary       INR check location:      Preferred lab:      Send INR reminders to:   [facility]    Comments:            Anticoagulation Care Providers       Provider Role Specialty Phone number    Lindsey Vargas MD Referring Cardiology [phone]            Clinic Interview:  Patient Findings     Positives:  Change in medications    Negatives:  Signs/symptoms of thrombosis, Signs/symptoms of bleeding,   Laboratory test error suspected, Change in health, Change in alcohol use,   Change in activity, Upcoming invasive procedure, Emergency department   visit, Upcoming dental procedure, Missed doses, Extra doses, Change in   diet/appetite, Hospital admission, Bruising, Other complaints    Comments:  Reports she completed a 10-day course of   amoxicillin/clavulanate on 10/7/23 (for stye treatment).       Clinical Outcomes     Negatives:  Major bleeding event, Thromboembolic event,   Anticoagulation-related hospital admission, Anticoagulation-related ED   visit, Anticoagulation-related fatality    Comments:  Reports she completed a 10-day course of   amoxicillin/clavulanate on 10/7/23 (for stye treatment).         INR History:      2023     9:00 AM 2023     9:00 AM 2023     9:00 AM 2023     9:00 AM 2023     9:00 AM 2023     9:00 AM 10/12/2023     8:30 AM   Anticoagulation  Monitoring   INR 2.6 2.9 3.2 2.0 1.8 2.3 2.6   INR Date 5/31/2023 6/30/2023 7/28/2023 8/29/2023 9/14/2023 9/28/2023 10/12/2023   INR Goal 2.5-3.5 2.5-3.5 2.5-3.5 2.5-3.5 2.5-3.5 2.5-3.5 2.5-3.5   Trend Same Same Same Same Up Up Same   Last Week Total 45 mg 42.5 mg 42.5 mg 42.5 mg 35 mg 45 mg 47.5 mg   Next Week Total 42.5 mg 42.5 mg 42.5 mg 45 mg 47.5 mg 47.5 mg 47.5 mg   Sun 5 mg 5 mg 5 mg 5 mg 5 mg 7.5 mg 7.5 mg   Mon 7.5 mg 7.5 mg 7.5 mg 7.5 mg 7.5 mg 7.5 mg 7.5 mg   Tue 5 mg 5 mg 5 mg 7.5 mg (8/29); Otherwise 5 mg 5 mg 5 mg 5 mg   Wed 7.5 mg 7.5 mg 7.5 mg 7.5 mg 7.5 mg 7.5 mg 7.5 mg   Thu 5 mg 5 mg 5 mg 5 mg 7.5 mg (9/14); Otherwise 5 mg 5 mg 5 mg   Fri 7.5 mg 7.5 mg 7.5 mg 7.5 mg 7.5 mg 7.5 mg 7.5 mg   Sat 5 mg 5 mg 5 mg 5 mg 7.5 mg 7.5 mg 7.5 mg       Plan:  1. INR is Therapeutic today- see above in Anticoagulation Summary.  Will instruct Yana Lehman to Continue their warfarin regimen- see above in Anticoagulation Summary.  2. Follow up in 2 weeks  3. Patient declines warfarin refills.  4. Verbal and written information provided. Patient expresses understanding and has no further questions at this time.    Grady Salazar, PharmD

## 2023-10-26 ENCOUNTER — ANTICOAGULATION VISIT (OUTPATIENT)
Dept: PHARMACY | Facility: HOSPITAL | Age: 68
End: 2023-10-26
Payer: MEDICARE

## 2023-10-26 DIAGNOSIS — Z79.01 WARFARIN ANTICOAGULATION: ICD-10-CM

## 2023-10-26 DIAGNOSIS — Z95.2 STATUS POST MITRAL VALVE REPLACEMENT: Primary | ICD-10-CM

## 2023-10-26 LAB
INR PPP: 3.2 (ref 0.91–1.09)
PROTHROMBIN TIME: 37.8 SECONDS (ref 10–13.8)

## 2023-10-26 PROCEDURE — 85610 PROTHROMBIN TIME: CPT

## 2023-10-26 PROCEDURE — 36416 COLLJ CAPILLARY BLOOD SPEC: CPT

## 2023-10-26 NOTE — PROGRESS NOTES
Anticoagulation Clinic Progress Note    Anticoagulation Summary  As of 10/26/2023      INR goal:  2.5-3.5   TTR:  73.3% (5.1 y)   INR used for dosing:  3.2 (10/26/2023)   Warfarin maintenance plan:  5 mg every Tue, Thu; 7.5 mg all other days   Weekly warfarin total:  47.5 mg   No change documented:  Kirsten Marte, Pharmacy Technician   Plan last modified:  Nickie Landin RP (9/28/2023)   Next INR check:  11/21/2023   Priority:  Maintenance   Target end date:  Indefinite    Indications    Status post mitral valve replacement [Z95.2]  Warfarin anticoagulation [Z79.01]                 Anticoagulation Episode Summary       INR check location:      Preferred lab:      Send INR reminders to:  [facility]    Comments:            Anticoagulation Care Providers       Provider Role Specialty Phone number    Lindsey Vargas MD Referring Cardiology [phone]            Clinic Interview:  Patient Findings     Negatives:  Signs/symptoms of thrombosis, Signs/symptoms of bleeding,   Laboratory test error suspected, Change in health, Change in alcohol use,   Change in activity, Upcoming invasive procedure, Emergency department   visit, Upcoming dental procedure, Missed doses, Extra doses, Change in   medications, Change in diet/appetite, Hospital admission, Bruising, Other   complaints      Clinical Outcomes     Negatives:  Major bleeding event, Thromboembolic event,   Anticoagulation-related hospital admission, Anticoagulation-related ED   visit, Anticoagulation-related fatality        INR History:      6/30/2023     9:00 AM 7/28/2023     9:00 AM 8/29/2023     9:00 AM 9/14/2023     9:00 AM 9/28/2023     9:00 AM 10/12/2023     8:30 AM 10/26/2023     8:45 AM   Anticoagulation Monitoring   INR 2.9 3.2 2.0 1.8 2.3 2.6 3.2   INR Date 6/30/2023 7/28/2023 8/29/2023 9/14/2023 9/28/2023 10/12/2023 10/26/2023   INR Goal 2.5-3.5 2.5-3.5 2.5-3.5 2.5-3.5 2.5-3.5 2.5-3.5 2.5-3.5   Trend Same Same Same Up Up Same  Same   Last Week Total 42.5 mg 42.5 mg 42.5 mg 35 mg 45 mg 47.5 mg 47.5 mg   Next Week Total 42.5 mg 42.5 mg 45 mg 47.5 mg 47.5 mg 47.5 mg 47.5 mg   Sun 5 mg 5 mg 5 mg 5 mg 7.5 mg 7.5 mg 7.5 mg   Mon 7.5 mg 7.5 mg 7.5 mg 7.5 mg 7.5 mg 7.5 mg 7.5 mg   Tue 5 mg 5 mg 7.5 mg (8/29); Otherwise 5 mg 5 mg 5 mg 5 mg 5 mg   Wed 7.5 mg 7.5 mg 7.5 mg 7.5 mg 7.5 mg 7.5 mg 7.5 mg   Thu 5 mg 5 mg 5 mg 7.5 mg (9/14); Otherwise 5 mg 5 mg 5 mg 5 mg   Fri 7.5 mg 7.5 mg 7.5 mg 7.5 mg 7.5 mg 7.5 mg 7.5 mg   Sat 5 mg 5 mg 5 mg 7.5 mg 7.5 mg 7.5 mg 7.5 mg       Plan:  1. INR is therapeutic today- see above in Anticoagulation Summary.   Will instruct Yana Lehman to continue their warfarin regimen- see above in Anticoagulation Summary.  2. Follow up in 4 weeks.  3. Patient declines warfarin refills.  4. Verbal and written information provided. Patient expresses understanding and has no further questions at this time.    Kirsten Marte, Pharmacy Technician

## 2023-11-02 ENCOUNTER — HOSPITAL ENCOUNTER (EMERGENCY)
Facility: HOSPITAL | Age: 68
Discharge: HOME | End: 2023-11-02
Attending: EMERGENCY MEDICINE
Payer: MEDICARE

## 2023-11-02 ENCOUNTER — APPOINTMENT (OUTPATIENT)
Dept: CT IMAGING | Facility: HOSPITAL | Age: 68
End: 2023-11-02
Payer: MEDICARE

## 2023-11-02 VITALS
RESPIRATION RATE: 18 BRPM | HEART RATE: 73 BPM | OXYGEN SATURATION: 98 % | TEMPERATURE: 99 F | DIASTOLIC BLOOD PRESSURE: 69 MMHG | SYSTOLIC BLOOD PRESSURE: 122 MMHG

## 2023-11-02 DIAGNOSIS — R31.9 URINARY TRACT INFECTION WITH HEMATURIA, SITE UNSPECIFIED: ICD-10-CM

## 2023-11-02 DIAGNOSIS — N39.0 URINARY TRACT INFECTION WITH HEMATURIA, SITE UNSPECIFIED: ICD-10-CM

## 2023-11-02 DIAGNOSIS — N30.01 ACUTE CYSTITIS WITH HEMATURIA: Primary | ICD-10-CM

## 2023-11-02 DIAGNOSIS — Z79.01 CHRONIC ANTICOAGULATION: ICD-10-CM

## 2023-11-02 LAB
ALBUMIN SERPL-MCNC: 4.6 G/DL (ref 3.5–5.2)
ALBUMIN/GLOB SERPL: 1.9 G/DL
ALP SERPL-CCNC: 78 U/L (ref 39–117)
ALT SERPL W P-5'-P-CCNC: 17 U/L (ref 1–33)
ANION GAP SERPL CALCULATED.3IONS-SCNC: 8.6 MMOL/L (ref 5–15)
AST SERPL-CCNC: 31 U/L (ref 1–32)
BACTERIA UR QL AUTO: ABNORMAL /HPF
BASOPHILS # BLD AUTO: 0.04 10*3/MM3 (ref 0–0.2)
BASOPHILS NFR BLD AUTO: 0.3 % (ref 0–1.5)
BILIRUB SERPL-MCNC: 0.7 MG/DL (ref 0–1.2)
BILIRUB UR QL STRIP: NEGATIVE
BUN SERPL-MCNC: 11 MG/DL (ref 8–23)
BUN/CREAT SERPL: 12.9 (ref 7–25)
CALCIUM SPEC-SCNC: 9.8 MG/DL (ref 8.6–10.5)
CHLORIDE SERPL-SCNC: 105 MMOL/L (ref 98–107)
CLARITY UR: ABNORMAL
CO2 SERPL-SCNC: 25.4 MMOL/L (ref 22–29)
COLOR UR: ABNORMAL
CREAT SERPL-MCNC: 0.85 MG/DL (ref 0.57–1)
DEPRECATED RDW RBC AUTO: 46.2 FL (ref 37–54)
EGFRCR SERPLBLD CKD-EPI 2021: 74.3 ML/MIN/1.73
EOSINOPHIL # BLD AUTO: 0.02 10*3/MM3 (ref 0–0.4)
EOSINOPHIL NFR BLD AUTO: 0.1 % (ref 0.3–6.2)
ERYTHROCYTE [DISTWIDTH] IN BLOOD BY AUTOMATED COUNT: 13.2 % (ref 12.3–15.4)
GLOBULIN UR ELPH-MCNC: 2.4 GM/DL
GLUCOSE SERPL-MCNC: 124 MG/DL (ref 65–99)
GLUCOSE UR STRIP-MCNC: NEGATIVE MG/DL
HCT VFR BLD AUTO: 42.6 % (ref 34–46.6)
HGB BLD-MCNC: 13.9 G/DL (ref 12–15.9)
HGB UR QL STRIP.AUTO: ABNORMAL
HYALINE CASTS UR QL AUTO: ABNORMAL /LPF
IMM GRANULOCYTES # BLD AUTO: 0.06 10*3/MM3 (ref 0–0.05)
IMM GRANULOCYTES NFR BLD AUTO: 0.4 % (ref 0–0.5)
INR PPP: 2.74 (ref 0.9–1.1)
KETONES UR QL STRIP: NEGATIVE
LEUKOCYTE ESTERASE UR QL STRIP.AUTO: ABNORMAL
LYMPHOCYTES # BLD AUTO: 1.21 10*3/MM3 (ref 0.7–3.1)
LYMPHOCYTES NFR BLD AUTO: 7.9 % (ref 19.6–45.3)
MCH RBC QN AUTO: 30.8 PG (ref 26.6–33)
MCHC RBC AUTO-ENTMCNC: 32.6 G/DL (ref 31.5–35.7)
MCV RBC AUTO: 94.2 FL (ref 79–97)
MONOCYTES # BLD AUTO: 0.96 10*3/MM3 (ref 0.1–0.9)
MONOCYTES NFR BLD AUTO: 6.2 % (ref 5–12)
NEUTROPHILS NFR BLD AUTO: 13.11 10*3/MM3 (ref 1.7–7)
NEUTROPHILS NFR BLD AUTO: 85.1 % (ref 42.7–76)
NITRITE UR QL STRIP: POSITIVE
NRBC BLD AUTO-RTO: 0 /100 WBC (ref 0–0.2)
PH UR STRIP.AUTO: 6 [PH] (ref 5–8)
PLATELET # BLD AUTO: 211 10*3/MM3 (ref 140–450)
PMV BLD AUTO: 10.7 FL (ref 6–12)
POTASSIUM SERPL-SCNC: 4.2 MMOL/L (ref 3.5–5.2)
PROT SERPL-MCNC: 7 G/DL (ref 6–8.5)
PROT UR QL STRIP: ABNORMAL
PROTHROMBIN TIME: 29.6 SECONDS (ref 11.7–14.2)
RBC # BLD AUTO: 4.52 10*6/MM3 (ref 3.77–5.28)
RBC # UR STRIP: ABNORMAL /HPF
REF LAB TEST METHOD: ABNORMAL
SODIUM SERPL-SCNC: 139 MMOL/L (ref 136–145)
SP GR UR STRIP: 1.02 (ref 1–1.03)
SQUAMOUS #/AREA URNS HPF: ABNORMAL /HPF
UROBILINOGEN UR QL STRIP: ABNORMAL
WBC # UR STRIP: ABNORMAL /HPF
WBC NRBC COR # BLD: 15.4 10*3/MM3 (ref 3.4–10.8)

## 2023-11-02 PROCEDURE — 85025 COMPLETE CBC W/AUTO DIFF WBC: CPT | Performed by: EMERGENCY MEDICINE

## 2023-11-02 PROCEDURE — 81001 URINALYSIS AUTO W/SCOPE: CPT | Performed by: EMERGENCY MEDICINE

## 2023-11-02 PROCEDURE — 74176 CT ABD & PELVIS W/O CONTRAST: CPT

## 2023-11-02 PROCEDURE — 80053 COMPREHEN METABOLIC PANEL: CPT | Performed by: EMERGENCY MEDICINE

## 2023-11-02 PROCEDURE — 99284 EMERGENCY DEPT VISIT MOD MDM: CPT

## 2023-11-02 PROCEDURE — 85610 PROTHROMBIN TIME: CPT | Performed by: EMERGENCY MEDICINE

## 2023-11-02 RX ORDER — CEPHALEXIN 500 MG/1
500 CAPSULE ORAL ONCE
Status: COMPLETED | OUTPATIENT
Start: 2023-11-02 | End: 2023-11-02

## 2023-11-02 RX ORDER — SODIUM CHLORIDE 0.9 % (FLUSH) 0.9 %
10 SYRINGE (ML) INJECTION AS NEEDED
Status: DISCONTINUED | OUTPATIENT
Start: 2023-11-02 | End: 2023-11-02 | Stop reason: HOSPADM

## 2023-11-02 RX ORDER — CEPHALEXIN 500 MG/1
500 CAPSULE ORAL 2 TIMES DAILY
Qty: 10 CAPSULE | Refills: 0 | Status: SHIPPED | OUTPATIENT
Start: 2023-11-02 | End: 2023-11-07

## 2023-11-02 RX ADMIN — CEPHALEXIN 500 MG: 500 CAPSULE ORAL at 16:27

## 2023-11-02 NOTE — ED NOTES
Patient to ER via car from home for blood in urine this morning  Reports she is on a blood thinners

## 2023-11-02 NOTE — ED PROVIDER NOTES
EMERGENCY DEPARTMENT ENCOUNTER    Room Number:  28/28  PCP: Shannan Fine APRN  Patient Care Team:  Shannan Fine APRN as PCP - General (Nurse Practitioner)  Renee Guy RPH as Pharmacist  Lindsey Vargas MD as Consulting Physician (Cardiology)   Independent Historians: Patient    HPI:  Chief Complaint: Blood in the urine    A complete HPI/ROS/PMH/PSH/SH/FH are unobtainable due to: Nothing    Chronic or social conditions impacting patient care (Social Determinants of Health): None  (Financial Resource Strain / Food Insecurity / Transportation Needs / Physical Activity / Stress / Social Connections / Intimate Partner Violence / Housing Stability)    Context: Yana Lehman is a 68 y.o. female who presents to the ED c/o acute blood in the urine.  The patient reports that last evening she developed some pain in her right lower flank.  She reports that today she had blood in her urine.  She states she is on Coumadin for mechanical valve.  She reports she has had some dysuria.  She reports her last INR check was last week and it was 3.  She denies any history of kidney stones.  She denies any prior history of hematuria.  She has not had any treatment for her symptoms.  Nothing makes it worse or better.    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    Non-rheumatic mitral valve stenosis 03/05/2018    Depression 03/05/2018    Warfarin anticoagulation 03/15/2018    Status post mitral valve replacement 03/15/2018    Pulmonary hypertension 03/15/2018    Tricuspid valve insufficiency 03/15/2018    Dilated cardiomyopathy 04/11/2018    Mitral valve insufficiency 04/18/2019     Resolved Ambulatory Problems     Diagnosis Date Noted    Atrial  fibrillation with rapid ventricular response 2018    Acute systolic congestive heart failure 2018    Persistent atrial fibrillation 2018    Healthy adult 2021     Past Medical History:   Diagnosis Date    Acute bronchitis     Acute kidney injury     Cachexia     Cardiomyopathy     H/O shortness of breath     Mitral valve stenosis     Pneumothorax     Rheumatic fever     Sinus tachycardia          PAST SURGICAL HISTORY  Past Surgical History:   Procedure Laterality Date    CARDIAC CATHETERIZATION      procedure outcome: successful    FOOT SURGERY      MITRAL VALVE REPLACEMENT  2015    Subhash Márquez    MITRAL VALVE REPLACEMENT      TONSILLECTOMY           FAMILY HISTORY  Family History   Problem Relation Age of Onset    Heart failure Mother         congestive    Cancer Father     Atrial fibrillation Sister     Hypertension Sister     Atrial fibrillation Brother     Heart disease Brother         cardiac pacemaker    Hypertension Brother          SOCIAL HISTORY  Social History     Socioeconomic History    Marital status: Single   Tobacco Use    Smoking status: Former     Types: Cigarettes     Quit date: 2018     Years since quittin.7    Smokeless tobacco: Never   Substance and Sexual Activity    Alcohol use: No     Comment: caffeine use: half and half 3 cups daily    Drug use: No         ALLERGIES  Erythromycin        REVIEW OF SYSTEMS  Review of Systems  Included in HPI  All systems reviewed and negative except for those discussed in HPI.      PHYSICAL EXAM    I have reviewed the triage vital signs and nursing notes.    ED Triage Vitals [23 1254]   Temp Heart Rate Resp BP SpO2   99 °F (37.2 °C) 73 18 -- 98 %      Temp src Heart Rate Source Patient Position BP Location FiO2 (%)   -- -- -- -- --       Physical Exam  GENERAL: Awake, alert, no acute distress  SKIN: Warm, dry  HENT: Normocephalic, atraumatic  EYES: no scleral icterus  CV: regular rhythm, regular rate  RESPIRATORY:  normal effort, lungs clear  ABDOMEN: soft, nontender, nondistended  MUSCULOSKELETAL: no deformity.  No CVA tenderness.  NEURO: alert, moves all extremities, follows commands                                                               LAB RESULTS  Recent Results (from the past 24 hour(s))   Urinalysis With Culture If Indicated - Urine, Clean Catch    Collection Time: 11/02/23  1:13 PM    Specimen: Urine, Clean Catch   Result Value Ref Range    Color, UA Other (A) Yellow, Straw    Appearance, UA Turbid (A) Clear    pH, UA 6.0 5.0 - 8.0    Specific Gravity, UA 1.018 1.005 - 1.030    Glucose, UA Negative Negative    Ketones, UA Negative Negative    Bilirubin, UA Negative Negative    Blood, UA Moderate (2+) (A) Negative    Protein,  mg/dL (2+) (A) Negative    Leuk Esterase, UA Large (3+) (A) Negative    Nitrite, UA Positive (A) Negative    Urobilinogen, UA 1.0 E.U./dL 0.2 - 1.0 E.U./dL   Urinalysis, Microscopic Only - Urine, Clean Catch    Collection Time: 11/02/23  1:13 PM    Specimen: Urine, Clean Catch   Result Value Ref Range    RBC, UA Too Numerous to Count (A) None Seen, 0-2 /HPF    WBC, UA Too Numerous to Count (A) None Seen, 0-2 /HPF    Bacteria, UA None Seen None Seen /HPF    Squamous Epithelial Cells, UA 0-2 None Seen, 0-2 /HPF    Hyaline Casts, UA None Seen None Seen /LPF    Methodology Manual Light Microscopy    Comprehensive Metabolic Panel    Collection Time: 11/02/23  1:18 PM    Specimen: Blood   Result Value Ref Range    Glucose 124 (H) 65 - 99 mg/dL    BUN 11 8 - 23 mg/dL    Creatinine 0.85 0.57 - 1.00 mg/dL    Sodium 139 136 - 145 mmol/L    Potassium 4.2 3.5 - 5.2 mmol/L    Chloride 105 98 - 107 mmol/L    CO2 25.4 22.0 - 29.0 mmol/L    Calcium 9.8 8.6 - 10.5 mg/dL    Total Protein 7.0 6.0 - 8.5 g/dL    Albumin 4.6 3.5 - 5.2 g/dL    ALT (SGPT) 17 1 - 33 U/L    AST (SGOT) 31 1 - 32 U/L    Alkaline Phosphatase 78 39 - 117 U/L    Total Bilirubin 0.7 0.0 - 1.2 mg/dL    Globulin 2.4 gm/dL    A/G Ratio  1.9 g/dL    BUN/Creatinine Ratio 12.9 7.0 - 25.0    Anion Gap 8.6 5.0 - 15.0 mmol/L    eGFR 74.3 >60.0 mL/min/1.73   Protime-INR    Collection Time: 11/02/23  1:18 PM    Specimen: Blood   Result Value Ref Range    Protime 29.6 (H) 11.7 - 14.2 Seconds    INR 2.74 (H) 0.90 - 1.10   CBC Auto Differential    Collection Time: 11/02/23  1:18 PM    Specimen: Blood   Result Value Ref Range    WBC 15.40 (H) 3.40 - 10.80 10*3/mm3    RBC 4.52 3.77 - 5.28 10*6/mm3    Hemoglobin 13.9 12.0 - 15.9 g/dL    Hematocrit 42.6 34.0 - 46.6 %    MCV 94.2 79.0 - 97.0 fL    MCH 30.8 26.6 - 33.0 pg    MCHC 32.6 31.5 - 35.7 g/dL    RDW 13.2 12.3 - 15.4 %    RDW-SD 46.2 37.0 - 54.0 fl    MPV 10.7 6.0 - 12.0 fL    Platelets 211 140 - 450 10*3/mm3    Neutrophil % 85.1 (H) 42.7 - 76.0 %    Lymphocyte % 7.9 (L) 19.6 - 45.3 %    Monocyte % 6.2 5.0 - 12.0 %    Eosinophil % 0.1 (L) 0.3 - 6.2 %    Basophil % 0.3 0.0 - 1.5 %    Immature Grans % 0.4 0.0 - 0.5 %    Neutrophils, Absolute 13.11 (H) 1.70 - 7.00 10*3/mm3    Lymphocytes, Absolute 1.21 0.70 - 3.10 10*3/mm3    Monocytes, Absolute 0.96 (H) 0.10 - 0.90 10*3/mm3    Eosinophils, Absolute 0.02 0.00 - 0.40 10*3/mm3    Basophils, Absolute 0.04 0.00 - 0.20 10*3/mm3    Immature Grans, Absolute 0.06 (H) 0.00 - 0.05 10*3/mm3    nRBC 0.0 0.0 - 0.2 /100 WBC       Ordered the above labs and independently reviewed the results.        RADIOLOGY  CT Abdomen Pelvis Without Contrast    Result Date: 11/2/2023  Examination: CT of the abdomen and pelvis without contrast  Technique: CT of the abdomen and pelvis without contrast per protocol. Radiation dose reduction techniques were utilized, including automated exposure control and exposure modulation based on body size.   History: Acute hematuria and right flank pain  Comparison: None available  Findings: Limited evaluation of the inferior thorax demonstrates right pleural calcification and scarring, without consolidation, pleural effusion, or pneumothorax. The  heart is mildly enlarged, without pericardial effusion. There is a prosthetic mitral valve.  Old granulomatous disease is seen in the liver and spleen. No stones are seen in either kidney, either ureter, or in the urinary bladder, and there is no hydronephrosis demonstrated on either side.  The adrenal glands, gallbladder, kidneys, pancreas, stomach, small bowel, appendix, uterus, urinary bladder, and abdominal vasculature are normal. There are colonic diverticula, without convincing evidence of acute diverticulitis. No intraperitoneal fluid collection or free gas are seen. No enlarged lymph nodes are demonstrated.  Bone windows demonstrate degenerative changes, without suspicious osseous lesion seen.      1. No renal or ureteral calculus seen  2. No acute or suspicious intraperitoneal process seen. Incidental findings as above.  This report was finalized on 11/2/2023 2:22 PM by Dr. Edgar Short M.D on Workstation: [id number]       I ordered the above noted radiological studies. Reviewed by me and discussed with radiologist.  See dictation for official radiology interpretation.      PROCEDURES    Procedures      MEDICATIONS GIVEN IN ER    Medications   sodium chloride 0.9 % flush 10 mL (has no administration in time range)   cephalexin (KEFLEX) capsule 500 mg (has no administration in time range)         ORDERS PLACED DURING THIS VISIT:  Orders Placed This Encounter   Procedures    Urine Culture - Urine,    CT Abdomen Pelvis Without Contrast    Comprehensive Metabolic Panel    Protime-INR    Urinalysis With Culture If Indicated - Urine, Clean Catch    CBC Auto Differential    Urinalysis, Microscopic Only - Urine, Clean Catch    Monitor Blood Pressure    Pulse Oximetry, Continuous    Urology (on-call MD unless specified)    Insert Peripheral IV    CBC & Differential         PROGRESS, DATA ANALYSIS, CONSULTS, AND MEDICAL DECISION MAKING    All labs have been independently interpreted by me.  All radiology studies  have been reviewed by me and discussed with radiologist dictating the report.   EKG's independently viewed and interpreted by me.  Discussion below represents my analysis of pertinent findings related to patient's condition, differential diagnosis, treatment plan and final disposition.    Differential diagnosis includes but is not limited to hematuria, UTI, Coumadin toxicity, kidney stone.    ED Course as of 11/02/23 1616   Thu Nov 02, 2023   1342 INR(!): 2.74 [TR]   1351 RBC, UA(!): Too Numerous to Count [TR]   1351 WBC, UA(!): Too Numerous to Count [TR]   1351 Bacteria, UA: None Seen [TR]   1401 CT Abdomen Pelvis Without Contrast  My independent interpretation of the CT of the abdomen pelvis is no ureteral stones [TR]   1504 I have a call out to urology to discuss. [TR]   1514 I reviewed the work-up and findings to this point with the patient at the bedside.  Answered all questions.  Pending urology input. [TR]   1613 Discussed with Dr. Sequeira with urology.  He will arrange follow-up in the office.  He recommends antibiotic treatment. [TR]   1616 I updated the patient on plan of care.  She agrees.  Plan discharge home with oral antibiotics. [TR]      ED Course User Index  [TR] Randell Braswell MD                AS OF 16:16 EDT VITALS:    BP - 122/69  HR - 73  TEMP - 99 °F (37.2 °C)  O2 SATS - 98%        DIAGNOSIS  Final diagnoses:   Acute cystitis with hematuria   Urinary tract infection with hematuria, site unspecified   Chronic anticoagulation         DISPOSITION  ED Disposition       ED Disposition   Discharge    Condition   Stable    Comment   --                  Note Disclaimer: At [facility], we believe that sharing information builds trust and better relationships. You are receiving this note because you recently visited [facility]. It is possible you will see health information before a provider has talked with you about it. This kind of information can be easy to misunderstand. To help you fully  understand what it means for your health, we urge you to discuss this note with your provider.         Randell Braswell MD  11/02/23 4045

## 2023-11-02 NOTE — DISCHARGE INSTRUCTIONS
Antibiotics as prescribed until they are gone.  Make an appointment with the urology office for further evaluation.  Have your INR checked frequently while you are on antibiotics.  Antibiotics can elevate your INR.  Return here immediately if you are unable to urinate, have clots in your urine, develop a fever, or have severe pain.  Follow-up with your primary care doctor for further evaluation.

## 2023-11-08 ENCOUNTER — ANTICOAGULATION VISIT (OUTPATIENT)
Dept: PHARMACY | Facility: HOSPITAL | Age: 68
End: 2023-11-08
Payer: MEDICARE

## 2023-11-08 DIAGNOSIS — Z95.2 STATUS POST MITRAL VALVE REPLACEMENT: Primary | ICD-10-CM

## 2023-11-08 DIAGNOSIS — Z79.01 WARFARIN ANTICOAGULATION: ICD-10-CM

## 2023-11-08 LAB
INR PPP: 3.1 (ref 0.91–1.09)
PROTHROMBIN TIME: 36.9 SECONDS (ref 10–13.8)

## 2023-11-08 PROCEDURE — 36416 COLLJ CAPILLARY BLOOD SPEC: CPT

## 2023-11-08 PROCEDURE — 85610 PROTHROMBIN TIME: CPT

## 2023-11-08 NOTE — PROGRESS NOTES
Anticoagulation Clinic Progress Note    Anticoagulation Summary  As of 11/8/2023      INR goal:  2.5-3.5   TTR:  73.5% (5.1 y)   INR used for dosing:  3.1 (11/8/2023)   Warfarin maintenance plan:  5 mg every Tue, Thu; 7.5 mg all other days   Weekly warfarin total:  47.5 mg   No change documented:  Emely Greene   Plan last modified:  Nickie Landin RPH (9/28/2023)   Next INR check:  12/6/2023   Priority:  Maintenance   Target end date:  Indefinite    Indications    Status post mitral valve replacement [Z95.2]  Warfarin anticoagulation [Z79.01]                 Anticoagulation Episode Summary       INR check location:      Preferred lab:      Send INR reminders to:  [facility]    Comments:            Anticoagulation Care Providers       Provider Role Specialty Phone number    Lindsey Vargas MD Referring Cardiology [phone]            Clinic Interview:  Patient Findings     Negatives:  Signs/symptoms of thrombosis, Signs/symptoms of bleeding,   Laboratory test error suspected, Change in health, Change in alcohol use,   Change in activity, Upcoming invasive procedure, Emergency department   visit, Upcoming dental procedure, Missed doses, Extra doses, Change in   medications, Change in diet/appetite, Hospital admission, Bruising, Other   complaints      Clinical Outcomes     Negatives:  Major bleeding event, Thromboembolic event,   Anticoagulation-related hospital admission, Anticoagulation-related ED   visit, Anticoagulation-related fatality        INR History:      Latest Ref Rng & Units 8/29/2023     9:00 AM 9/14/2023     9:00 AM 9/28/2023     9:00 AM 10/12/2023     8:30 AM 10/26/2023     8:45 AM 11/2/2023     1:18 PM 11/8/2023     9:15 AM   Anticoagulation Monitoring   INR  2.0 1.8 2.3 2.6 3.2  3.1   INR Date  8/29/2023 9/14/2023 9/28/2023 10/12/2023 10/26/2023  11/8/2023   INR Goal  2.5-3.5 2.5-3.5 2.5-3.5 2.5-3.5 2.5-3.5  2.5-3.5   Trend  Same Up Up Same Same  Same   Last  Week Total  42.5 mg 35 mg 45 mg 47.5 mg 47.5 mg  47.5 mg   Next Week Total  45 mg 47.5 mg 47.5 mg 47.5 mg 47.5 mg  47.5 mg   Sun  5 mg 5 mg 7.5 mg 7.5 mg 7.5 mg  7.5 mg   Mon  7.5 mg 7.5 mg 7.5 mg 7.5 mg 7.5 mg  7.5 mg   Tue  7.5 mg (8/29); Otherwise 5 mg 5 mg 5 mg 5 mg 5 mg  5 mg   Wed  7.5 mg 7.5 mg 7.5 mg 7.5 mg 7.5 mg  7.5 mg   Thu  5 mg 7.5 mg (9/14); Otherwise 5 mg 5 mg 5 mg 5 mg  5 mg   Fri  7.5 mg 7.5 mg 7.5 mg 7.5 mg 7.5 mg  7.5 mg   Sat  5 mg 7.5 mg 7.5 mg 7.5 mg 7.5 mg  7.5 mg   Historical INR 0.90 - 1.10      2.74         Plan:  1. INR is therapeutic today- see above in Anticoagulation Summary.   Will instruct Yana Lehman to continue their warfarin regimen- see above in Anticoagulation Summary.  2. Follow up in 4 weeks.  3. Patient declines warfarin refills.  4. Verbal and written information provided. Patient expresses understanding and has no further questions at this time.    Emely Greene

## 2023-12-06 ENCOUNTER — ANTICOAGULATION VISIT (OUTPATIENT)
Dept: PHARMACY | Facility: HOSPITAL | Age: 68
End: 2023-12-06
Payer: MEDICARE

## 2023-12-06 DIAGNOSIS — Z79.01 WARFARIN ANTICOAGULATION: ICD-10-CM

## 2023-12-06 DIAGNOSIS — Z95.2 STATUS POST MITRAL VALVE REPLACEMENT: Primary | ICD-10-CM

## 2023-12-06 LAB
INR PPP: 3.4 (ref 0.91–1.09)
PROTHROMBIN TIME: 40.9 SECONDS (ref 10–13.8)

## 2023-12-06 PROCEDURE — 36416 COLLJ CAPILLARY BLOOD SPEC: CPT

## 2023-12-06 PROCEDURE — 85610 PROTHROMBIN TIME: CPT

## 2023-12-06 NOTE — PROGRESS NOTES
Anticoagulation Clinic Progress Note    Anticoagulation Summary  As of 12/6/2023      INR goal:  2.5-3.5   TTR:  73.9% (5.2 y)   INR used for dosing:  3.4 (12/6/2023)   Warfarin maintenance plan:  5 mg every Tue, Thu; 7.5 mg all other days   Weekly warfarin total:  47.5 mg   No change documented:  Emely Greene   Plan last modified:  Nickie Landin RPH (9/28/2023)   Next INR check:  1/3/2024   Priority:  Maintenance   Target end date:  Indefinite    Indications    Status post mitral valve replacement [Z95.2]  Warfarin anticoagulation [Z79.01]                 Anticoagulation Episode Summary       INR check location:      Preferred lab:      Send INR reminders to:  [facility]    Comments:            Anticoagulation Care Providers       Provider Role Specialty Phone number    Lindsey Vargas MD Referring Cardiology [phone]            Clinic Interview:  Patient Findings     Positives:  Upcoming invasive procedure    Negatives:  Signs/symptoms of thrombosis, Signs/symptoms of bleeding,   Laboratory test error suspected, Change in health, Change in alcohol use,   Change in activity, Emergency department visit, Upcoming dental procedure,   Missed doses, Extra doses, Change in medications, Change in diet/appetite,   Hospital admission, Bruising, Other complaints      Clinical Outcomes     Negatives:  Major bleeding event, Thromboembolic event,   Anticoagulation-related hospital admission, Anticoagulation-related ED   visit, Anticoagulation-related fatality        INR History:      Latest Ref Rng & Units 9/14/2023     9:00 AM 9/28/2023     9:00 AM 10/12/2023     8:30 AM 10/26/2023     8:45 AM 11/2/2023     1:18 PM 11/8/2023     9:15 AM 12/6/2023     9:15 AM   Anticoagulation Monitoring   INR  1.8 2.3 2.6 3.2  3.1 3.4   INR Date  9/14/2023 9/28/2023 10/12/2023 10/26/2023  11/8/2023 12/6/2023   INR Goal  2.5-3.5 2.5-3.5 2.5-3.5 2.5-3.5  2.5-3.5 2.5-3.5   Trend  Up Up Same Same  Same  Same   Last Week Total  35 mg 45 mg 47.5 mg 47.5 mg  47.5 mg 47.5 mg   Next Week Total  47.5 mg 47.5 mg 47.5 mg 47.5 mg  47.5 mg 47.5 mg   Sun  5 mg 7.5 mg 7.5 mg 7.5 mg  7.5 mg 7.5 mg   Mon  7.5 mg 7.5 mg 7.5 mg 7.5 mg  7.5 mg 7.5 mg   Tue  5 mg 5 mg 5 mg 5 mg  5 mg 5 mg   Wed  7.5 mg 7.5 mg 7.5 mg 7.5 mg  7.5 mg 7.5 mg   Thu  7.5 mg (9/14); Otherwise 5 mg 5 mg 5 mg 5 mg  5 mg 5 mg   Fri  7.5 mg 7.5 mg 7.5 mg 7.5 mg  7.5 mg 7.5 mg   Sat  7.5 mg 7.5 mg 7.5 mg 7.5 mg  7.5 mg 7.5 mg   Historical INR 0.90 - 1.10     2.74          Plan:  1. INR is therapeutic today- see above in Anticoagulation Summary.   Will instruct Yana Lehman to continue their warfarin regimen- see above in Anticoagulation Summary.  2. Follow up in 4 weeks.  3. Patient declines warfarin refills.  4. Verbal and written information provided. Patient expresses understanding and has no further questions at this time.    Emely Greene

## 2023-12-14 ENCOUNTER — OFFICE VISIT (OUTPATIENT)
Dept: CARDIOLOGY | Facility: CLINIC | Age: 68
End: 2023-12-14
Payer: MEDICARE

## 2023-12-14 VITALS
SYSTOLIC BLOOD PRESSURE: 126 MMHG | DIASTOLIC BLOOD PRESSURE: 74 MMHG | BODY MASS INDEX: 19.88 KG/M2 | HEIGHT: 62 IN | HEART RATE: 81 BPM | WEIGHT: 108 LBS

## 2023-12-14 DIAGNOSIS — I48.21 PERMANENT ATRIAL FIBRILLATION: ICD-10-CM

## 2023-12-14 DIAGNOSIS — I42.0 DILATED CARDIOMYOPATHY: Primary | ICD-10-CM

## 2023-12-14 DIAGNOSIS — I34.0 NONRHEUMATIC MITRAL VALVE REGURGITATION: ICD-10-CM

## 2023-12-14 DIAGNOSIS — I34.2 NON-RHEUMATIC MITRAL VALVE STENOSIS: ICD-10-CM

## 2023-12-14 DIAGNOSIS — Z95.2 STATUS POST MITRAL VALVE REPLACEMENT: ICD-10-CM

## 2023-12-14 PROCEDURE — 1160F RVW MEDS BY RX/DR IN RCRD: CPT | Performed by: INTERNAL MEDICINE

## 2023-12-14 PROCEDURE — 1159F MED LIST DOCD IN RCRD: CPT | Performed by: INTERNAL MEDICINE

## 2023-12-14 PROCEDURE — 99214 OFFICE O/P EST MOD 30 MIN: CPT | Performed by: INTERNAL MEDICINE

## 2023-12-14 PROCEDURE — 93000 ELECTROCARDIOGRAM COMPLETE: CPT | Performed by: INTERNAL MEDICINE

## 2023-12-14 NOTE — PROGRESS NOTES
Date of Office Visit: 2021  Encounter Provider: Lindsey Vargas MD  Place of Service: [facility] CARDIOLOGY  Patient Name: Yana Lehman  :1955      Patient ID:  Yana Lehman is a 68 y.o. female is here for  followup for mitral stenosis, status post mechanical mitral valve        History of Present Illness    She came in through the emergency department on  06/10/2015 with atrial fibrillation and rapid ventricular response.  At that time, she was  tachycardic and hypotensive.  Her cardiac output was very poor.  Her hands and feet were  cool.  Her skin was overall kind of dusky and almost jaundice in appearance.  She was from  a cardiovascular standpoint very unstable.  A stat echocardiogram was ordered and she was  started on fluid boluses.  Her echocardiogram showed an ejection fraction of 50% to 55%  with severe left atrial dilation, severe right ventricular dilation, severe reduction of  right ventricular systolic function, severe right atrial dilation, trace to mild aortic  insufficiency, severely thickened mitral leaflets with grade 3 mobility of the leaflets  and severe mitral stenosis.  The mean gradient across the valve was 14 mmHg.  Her right  ventricular systolic pressure was 94 mmHg and there was moderate tricuspid insufficiency.   An emergent KITTY was done which showed severe left atrial dilation, severely dilated right  ventricle with severe reduction of right ventricular systolic function, severe mitral  stenosis, mild to moderate mitral insufficiency, and moderate tricuspid insufficiency.   She had a cardiac catheterization done on 06/10/2015 showing normal coronary arteries,  moderate to severe pulmonary hypertension, mildly depressed cardiac output, and severely  elevated peripheral vascular resistance.  Her PA pressure was a mean of 55 mmHg.  The RV  pressure was 63/3.  The right atrial pressure was 25.  Dr. Márquez saw her immediately and  took  her to the operating room where she had emergency mitral valve replacement with a 31  mm Medtronic mechanical valve.  Preservation of the papillary muscle with two [place].       She presented to [facility] on 3/4/18 and was noted to have been noncompliant for several years due to depression in social situations. She stopped many of her medications, but did remain on her warfarin.  She presented with fatigue and palpitations.  She had a 2-D echocardiogram completed which revealed calculated EF of 32% and estimated EF of 21-25%, left ventricular cavity mild to moderately dilated, wall motion abnormalities, left atrium and right ventricle cavity moderately dilated, mechanical mitral valve prosthesis present and grossly normal, moderate tricuspid valve regurgitation, and mild pulmonary hypertension with RVSP of 41 mmHg.  She was noted to have a chads 2 VASC score of 2 and was recommended to start carvedilol, digoxin, furosemide, potassium, and warfarin.  Stress nuclear Study done 9/5/2018 showed no evidence of ischemia, mild septal hypokinesis but normal ejection fraction.     She had an echocardiogram done 5/20/2019 showing RVSP 38, ejection fraction 46 - 50%, normal-appearing 31 mm Medtronic bileaflet mechanical valve, mean gradient 4, peak gradient 12.  Echo done 12/14/2022 showed ejection fraction 59% with severe left atrial enlargement, mild to moderate right atrial enlargement, mechanical mitral valve normal, moderately reduced right ventricular systolic function.     Labs 11/2/2023 show unremarkable CMP normal CBC.  Last lipids were done 3/27/2023 show triglycerides 98, total cholesterol 155, HDL 85, LDL 80, VLDL 20, normal CMP, normal CBC, normal TSH, hemoglobin A1c 5.7%.    She has no chest pain or pressure.  She has no difficulty breathing.  She does not feel her heart racing or skipping.  She has no orthopnea or PND.  She has no dizziness, syncope or falls.  She has no she is taking her  medications as directed without difficulty.    Past Medical History:   Diagnosis Date    Acute bronchitis     Acute kidney injury     after surgery    Acute systolic congestive heart failure 3/5/2018    Cachexia     Cardiomyopathy     unspecified type    Depression     H/O shortness of breath     Mitral valve stenosis     severe; s/p mitral valve replacement     Persistent atrial fibrillation     on Toprol and warfarin    Pneumothorax     Pulmonary hypertension     Rheumatic fever     Sinus tachycardia     Tricuspid valve insufficiency 3/15/2018    Warfarin anticoagulation 03/15/2018    followed by [facility]         Past Surgical History:   Procedure Laterality Date    CARDIAC CATHETERIZATION      procedure outcome: successful    FOOT SURGERY      MITRAL VALVE REPLACEMENT  06/2015    Subhash Márquez    MITRAL VALVE REPLACEMENT      TONSILLECTOMY         Current Outpatient Medications on File Prior to Visit   Medication Sig Dispense Refill    ascorbic acid (VITAMIN C) 100 MG tablet Take  by mouth.      Biotin 33029 MCG tablet Take 1 tablet by mouth Daily.      Cholecalciferol (VITAMIN D-3) 125 MCG (5000 UT) tablet Take 1 tablet by mouth Daily.      digoxin (LANOXIN) 125 MCG tablet Take 1 tablet by mouth Daily. 90 tablet 3    losartan (COZAAR) 100 MG tablet Take 1 tablet by mouth Every Night. 90 tablet 3    metoprolol succinate XL (TOPROL-XL) 25 MG 24 hr tablet Take 1 tablet by mouth 2 (Two) Times a Day. 180 tablet 3    simvastatin (ZOCOR) 10 MG tablet Take 1 tablet by mouth Daily.      VITAMIN E PO Take  by mouth.      warfarin (COUMADIN) 5 MG tablet Take one tablet by mouth on sun, tues, thurs and take one and one-half by mouth all other days or as directed 120 tablet 1    Zinc Sulfate 66 MG tablet Take  by mouth.      albuterol sulfate  (90 Base) MCG/ACT inhaler Inhale 2 puffs. (Patient not taking: Reported on 12/14/2023)      amoxicillin (AMOXIL) 500 MG capsule Take 4 capsules by mouth See Admin  "Instructions. 4 capsules 1 hour prior to procedure (Patient not taking: Reported on 2023) 12 capsule 0    [DISCONTINUED] diphenhydrAMINE (BENADRYL) 25 mg capsule Take 1 capsule by mouth Every 6 (Six) Hours As Needed for Itching. (Patient not taking: Reported on 2023) 30 capsule 0    [DISCONTINUED] Enoxaparin Sodium (LOVENOX) 60 MG/0.6ML solution prefilled syringe syringe Inject 50 mg (0.5ml) under the skin every 12 hours as directed (patient coming to clinic for injection training) (Patient not taking: Reported on 2023) 12 mL 0    [DISCONTINUED] triamcinolone (KENALOG) 0.1 % ointment Apply 1 application  topically to the appropriate area as directed 2 (Two) Times a Day. (Patient not taking: Reported on 2023) 30 g 0     No current facility-administered medications on file prior to visit.       Social History     Socioeconomic History    Marital status: Single   Tobacco Use    Smoking status: Former     Types: Cigarettes     Quit date: 2018     Years since quittin.8     Passive exposure: Past    Smokeless tobacco: Never   Vaping Use    Vaping Use: Never used   Substance and Sexual Activity    Alcohol use: No     Comment: caffeine use: half and half 3 cups daily    Drug use: No    Sexual activity: Defer           ROS    Procedures    ECG 12 Lead    Date/Time: 2023 9:39 AM  Performed by: Lindsey Vargas MD    Authorized by: Lindsey Vargas MD  Comparison: compared with previous ECG   Similar to previous ECG  Rhythm: atrial fibrillation  Other findings: poor R wave progression    Clinical impression: abnormal EKG              Objective:      Vitals:    23 0919   BP: 126/74   Pulse: 81   Weight: 49 kg (108 lb)   Height: 157.5 cm (62\")     Body mass index is 19.75 kg/m².    Vitals reviewed.   Constitutional:       General: Not in acute distress.     Appearance: Well-developed. Not diaphoretic.   Eyes:      General: No scleral icterus.     Conjunctiva/sclera: " Conjunctivae normal.   HENT:      Head: Normocephalic and atraumatic.   Neck:      Thyroid: No thyromegaly.      Vascular: No carotid bruit or JVD.      Lymphadenopathy: No cervical adenopathy.   Pulmonary:      Effort: Pulmonary effort is normal. No respiratory distress.      Breath sounds: Normal breath sounds. No wheezing. No rhonchi. No rales.   Chest:      Chest wall: Not tender to palpatation.   Cardiovascular:      Normal rate. Irregularly irregular rhythm.      Murmurs: There is no murmur.      No gallop.       Comments: mech click  Pulses:     Intact distal pulses.   Edema:     Peripheral edema absent.   Abdominal:      General: Bowel sounds are normal. There is no distension or abdominal bruit.      Palpations: Abdomen is soft. There is no abdominal mass.      Tenderness: There is no abdominal tenderness.   Musculoskeletal:         General: No deformity.      Extremities: No clubbing present.     Cervical back: Neck supple. Skin:     General: Skin is warm and dry. There is no cyanosis.      Coloration: Skin is not pale.      Findings: No rash.   Neurological:      Mental Status: Alert and oriented to person, place, and time.      Cranial Nerves: No cranial nerve deficit.   Psychiatric:         Judgment: Judgment normal.         Lab Review:       Assessment:      Diagnosis Plan   1. Dilated cardiomyopathy        2. Status post mitral valve replacement        3. Non-rheumatic mitral valve stenosis        4. Nonrheumatic mitral valve regurgitation        5. Permanent atrial fibrillation          H/o LV Cardiomyopathy, resolved.  She does still have right ventricular dilation with decreased right ventricular function but no volume overload.   Atrial fibrillation - on warfarin, heart rate better on metoprolol and digoxin.   S/p MV replacement, 31 mm Medtronic mechanical, for mitral stenosis.  Functioning well.   Pulmonary HTN, improved  Depression  Hypertension, goal <120/80.   Blood pressure well-controlled,  maintain losartan and metoprolol.  Hematuria, seeing urology  Positive Cologuard normal colonoscopy done 6/2023.          Plan:       See Haley back in 1 year-repeat echocardiogram in 2025.  No medication changes.  Use endocarditis prophylaxis with procedures.  She is a low cardiovascular risk to undergo her urologic procedure and may proceed without further testing.  She wants to be bridged with Lovenox.

## 2023-12-22 ENCOUNTER — TELEPHONE (OUTPATIENT)
Dept: CARDIOLOGY | Facility: CLINIC | Age: 68
End: 2023-12-22
Payer: MEDICARE

## 2023-12-22 ENCOUNTER — ANTICOAGULATION VISIT (OUTPATIENT)
Dept: PHARMACY | Facility: HOSPITAL | Age: 68
End: 2023-12-22
Payer: MEDICARE

## 2023-12-22 DIAGNOSIS — Z79.01 WARFARIN ANTICOAGULATION: ICD-10-CM

## 2023-12-22 DIAGNOSIS — Z95.2 STATUS POST MITRAL VALVE REPLACEMENT: Primary | ICD-10-CM

## 2023-12-22 LAB
INR PPP: 1.5 (ref 0.91–1.09)
PROTHROMBIN TIME: 17.5 SECONDS (ref 10–13.8)

## 2023-12-22 PROCEDURE — 85610 PROTHROMBIN TIME: CPT

## 2023-12-22 PROCEDURE — G0463 HOSPITAL OUTPT CLINIC VISIT: HCPCS

## 2023-12-22 PROCEDURE — 36416 COLLJ CAPILLARY BLOOD SPEC: CPT

## 2023-12-22 NOTE — PROGRESS NOTES
Anticoagulation Clinic Progress Note    Anticoagulation Summary  As of 2023      INR goal:  2.5-3.5   TTR:  73.6% (5.2 y)   INR used for dosin.5 (2023)   Warfarin maintenance plan:  5 mg every e, Thu; 7.5 mg all other days   Weekly warfarin total:  47.5 mg   Plan last modified:  Nickie Landin RPH (2023)   Next INR check:  2023   Priority:  Maintenance   Target end date:  Indefinite    Indications    Status post mitral valve replacement [Z95.2]  Warfarin anticoagulation [Z79.01]                 Anticoagulation Episode Summary       INR check location:      Preferred lab:      Send INR reminders to:   [facility] [place]    Comments:            Anticoagulation Care Providers       Provider Role Specialty Phone number    Lindsey Vargas MD Referring Cardiology [phone]            Clinic Interview:  Patient Findings     Positives:  Change in health, Change in medications, Other complaints    Negatives:  Signs/symptoms of thrombosis, Signs/symptoms of bleeding,   Laboratory test error suspected, Change in alcohol use, Change in   activity, Upcoming invasive procedure, Emergency department visit,   Upcoming dental procedure, Missed doses, Extra doses, Change in   diet/appetite, Hospital admission, Bruising    Comments:  Reports UTI. She was originally prescribed cefdinir on   12/15/23;  however, this was changed to levofloxacin on 23.   Unfortunately, she developed what she reports as bilateral tendon pain   stretching from lower calves to ankles, and she discontinued after 2   doses. She has since been prescribed Bactrim (TMP/SMX), and she plans to   pick this prescription up today.       Clinical Outcomes     Negatives:  Major bleeding event, Thromboembolic event,   Anticoagulation-related hospital admission, Anticoagulation-related ED   visit, Anticoagulation-related fatality    Comments:  Reports UTI. She was originally prescribed cefdinir on   12/15/23;   however, this was changed to levofloxacin on 12/19/23.   Unfortunately, she developed what she reports as bilateral tendon pain   stretching from lower calves to ankles, and she discontinued after 2   doses. She has since been prescribed Bactrim (TMP/SMX), and she plans to   pick this prescription up today.         INR History:      Latest Ref Rng & Units 9/28/2023     9:00 AM 10/12/2023     8:30 AM 10/26/2023     8:45 AM 11/2/2023     1:18 PM 11/8/2023     9:15 AM 12/6/2023     9:15 AM 12/22/2023     9:15 AM   Anticoagulation Monitoring   INR  2.3 2.6 3.2  3.1 3.4 1.5   INR Date  9/28/2023 10/12/2023 10/26/2023  11/8/2023 12/6/2023 12/22/2023   INR Goal  2.5-3.5 2.5-3.5 2.5-3.5  2.5-3.5 2.5-3.5 2.5-3.5   Trend  Up Same Same  Same Same Same   Last Week Total  45 mg 47.5 mg 47.5 mg  47.5 mg 47.5 mg 40 mg   Next Week Total  47.5 mg 47.5 mg 47.5 mg  47.5 mg 47.5 mg 55 mg   Sun  7.5 mg 7.5 mg 7.5 mg  7.5 mg 7.5 mg 7.5 mg   Mon  7.5 mg 7.5 mg 7.5 mg  7.5 mg 7.5 mg 7.5 mg   Tue  5 mg 5 mg 5 mg  5 mg 5 mg -   Wed  7.5 mg 7.5 mg 7.5 mg  7.5 mg 7.5 mg -   Thu  5 mg 5 mg 5 mg  5 mg 5 mg -   Fri  7.5 mg 7.5 mg 7.5 mg  7.5 mg 7.5 mg 12.5 mg (12/22)   Sat  7.5 mg 7.5 mg 7.5 mg  7.5 mg 7.5 mg 10 mg (12/23)   Historical INR 0.90 - 1.10    2.74           Plan:  1. INR is Subtherapeutic today- see above in Anticoagulation Summary.  Will instruct Yana Lehman to Change their warfarin regimen (12.5 mg today, 10 mg tomorrow, then resume 5 mg Tues/Thurs, 7.5 mg all other days) - see above in Anticoagulation Summary.  2. Follow up in 4 days.  3. Patient declines warfarin refills.  4. Verbal and written information provided. Patient expresses understanding and has no further questions at this time.    Grady Salazar, PharmD

## 2023-12-22 NOTE — TELEPHONE ENCOUNTER
Caller: Yana Lehman    Relationship: Self    Best call back number: [phone]     Which medication are you concerned about: DIGOXIN, WARFARIN AND BACTRIM     Who prescribed you this medication: DR. CHAVARRIA     When did you start taking this medication: QUITE SOME TIME( PT COULD NOT SPECIFY)    What are your concerns: PT CALLED [facility] STATING THAT SHE HAD WENT TO SEE HER UROLOGIST AND WAS PLACE ON MEDICATION CALLED BACTRIM,DUE TO AN INFECTION IN THE PT URINE. PT WAS TOLD BY INR CLINIC THAT SHE WOULD NEED TO TAKE A DIFFERENT MEDICATION.THE PHARMACIST TOLD THE PT TO DOUBLE UP HER DOSE OF THE WARFARIN DUE TO HER MISSING A DOSE.  PT IS UNCOMFORTABLE WITH THE AMOUNT OF MEDICATION SHE WAS GIVEN. PT STATED THAT SHE WOULD LIKE CLARIFICATION TO WHICH MEDICATION TO TAKE.       How long have you had these concerns: A FEW DAYS

## 2023-12-26 ENCOUNTER — ANTICOAGULATION VISIT (OUTPATIENT)
Dept: PHARMACY | Facility: HOSPITAL | Age: 68
End: 2023-12-26
Payer: MEDICARE

## 2023-12-26 DIAGNOSIS — Z79.01 WARFARIN ANTICOAGULATION: ICD-10-CM

## 2023-12-26 DIAGNOSIS — Z95.2 STATUS POST MITRAL VALVE REPLACEMENT: Primary | ICD-10-CM

## 2023-12-26 LAB
INR PPP: 3.5 (ref 0.91–1.09)
PROTHROMBIN TIME: 41.8 SECONDS (ref 10–13.8)

## 2023-12-26 PROCEDURE — 85610 PROTHROMBIN TIME: CPT

## 2023-12-26 PROCEDURE — 36416 COLLJ CAPILLARY BLOOD SPEC: CPT

## 2023-12-26 NOTE — TELEPHONE ENCOUNTER
Bactrim may cause make her warfarin and digoxin stronger.   I think it's ok for her today, but I would only take digoxin every other day while taking bactrim.   Also, I would check her INR after her first or second dose of bactrim to make sure INR doesn't become too high.     Thanks!  MONICA Barber

## 2023-12-26 NOTE — PROGRESS NOTES
Anticoagulation Clinic Progress Note    Anticoagulation Summary  As of 12/26/2023      INR goal:  2.5-3.5   TTR:  73.6% (5.2 y)   INR used for dosing:  3.5 (12/26/2023)   Warfarin maintenance plan:  5 mg every Tue, Thu; 7.5 mg all other days   Weekly warfarin total:  47.5 mg   No change documented:  Kirsten Marte, Pharmacy Technician   Plan last modified:  Nickie Landin RP (9/28/2023)   Next INR check:  1/9/2024   Priority:  Maintenance   Target end date:  Indefinite    Indications    Status post mitral valve replacement [Z95.2]  Warfarin anticoagulation [Z79.01]                 Anticoagulation Episode Summary       INR check location:      Preferred lab:      Send INR reminders to:  [facility] [place]    Comments:            Anticoagulation Care Providers       Provider Role Specialty Phone number    Lindsey Vargas MD Referring Cardiology [phone]            Clinic Interview:  Patient Findings     Positives:  Change in medications    Negatives:  Signs/symptoms of thrombosis, Signs/symptoms of bleeding,   Laboratory test error suspected, Change in health, Change in alcohol use,   Change in activity, Upcoming invasive procedure, Emergency department   visit, Upcoming dental procedure, Missed doses, Extra doses, Change in   diet/appetite, Hospital admission, Bruising, Other complaints    Comments:  Reports she is not taking any of the antibiotics that have   been prescribed for UTI because the pharmacy told her they will interact   with several of her other medications. She is contacting the urologist to   have another urine sample tested to see if she still has an infection and   if taking an antibiotic is still necessary at this point. She will contact   us if she does start a new medication.      Clinical Outcomes     Negatives:  Major bleeding event, Thromboembolic event,   Anticoagulation-related hospital admission, Anticoagulation-related ED   visit, Anticoagulation-related fatality     Comments:  Reports she is not taking any of the antibiotics that have   been prescribed for UTI because the pharmacy told her they will interact   with several of her other medications. She is contacting the urologist to   have another urine sample tested to see if she still has an infection and   if taking an antibiotic is still necessary at this point. She will contact   us if she does start a new medication.        INR History:      Latest Ref Rng & Units 10/12/2023     8:30 AM 10/26/2023     8:45 AM 11/2/2023     1:18 PM 11/8/2023     9:15 AM 12/6/2023     9:15 AM 12/22/2023     9:15 AM 12/26/2023    10:15 AM   Anticoagulation Monitoring   INR  2.6 3.2  3.1 3.4 1.5 3.5   INR Date  10/12/2023 10/26/2023  11/8/2023 12/6/2023 12/22/2023 12/26/2023   INR Goal  2.5-3.5 2.5-3.5  2.5-3.5 2.5-3.5 2.5-3.5 2.5-3.5   Trend  Same Same  Same Same Same Same   Last Week Total  47.5 mg 47.5 mg  47.5 mg 47.5 mg 40 mg 47.5 mg   Next Week Total  47.5 mg 47.5 mg  47.5 mg 47.5 mg 55 mg 47.5 mg   Sun  7.5 mg 7.5 mg  7.5 mg 7.5 mg 7.5 mg 7.5 mg   Mon  7.5 mg 7.5 mg  7.5 mg 7.5 mg 7.5 mg 7.5 mg   Tue  5 mg 5 mg  5 mg 5 mg - 5 mg   Wed  7.5 mg 7.5 mg  7.5 mg 7.5 mg - 7.5 mg   Thu  5 mg 5 mg  5 mg 5 mg - 5 mg   Fri  7.5 mg 7.5 mg  7.5 mg 7.5 mg 12.5 mg (12/22) 7.5 mg   Sat  7.5 mg 7.5 mg  7.5 mg 7.5 mg 10 mg (12/23) 7.5 mg   Historical INR 0.90 - 1.10   2.74            Plan:  1. INR is therapeutic today- see above in Anticoagulation Summary.   Will instruct Yana Lehman to continue their warfarin regimen- see above in Anticoagulation Summary.  2. Follow up in 2 weeks.  3. Patient declines warfarin refills.  4. Verbal and written information provided. Patient expresses understanding and has no further questions at this time.    Kirsten Marte, Pharmacy Technician

## 2023-12-26 NOTE — TELEPHONE ENCOUNTER
Called and spoke with the patient and she told me that she was going to go back to the urologist.  She stated that the pharmacist told her that she could not take her digoxin while she is taking bactrim.  She is wanting to know if there is a reaction with the 2 meds?  If so, she is going to go back to the urologist and have them giver her something that will not interact with her current meds.  Please advise.    Aide

## 2023-12-29 RX ORDER — DIGOXIN 125 MCG
125 TABLET ORAL DAILY
Qty: 90 TABLET | Refills: 3 | Status: SHIPPED | OUTPATIENT
Start: 2023-12-29

## 2023-12-29 RX ORDER — LOSARTAN POTASSIUM 100 MG/1
100 TABLET ORAL NIGHTLY
Qty: 90 TABLET | Refills: 3 | Status: SHIPPED | OUTPATIENT
Start: 2023-12-29

## 2023-12-29 RX ORDER — METOPROLOL SUCCINATE 25 MG/1
25 TABLET, EXTENDED RELEASE ORAL 2 TIMES DAILY
Qty: 180 TABLET | Refills: 3 | Status: SHIPPED | OUTPATIENT
Start: 2023-12-29

## 2024-01-09 ENCOUNTER — ANTICOAGULATION VISIT (OUTPATIENT)
Dept: PHARMACY | Facility: HOSPITAL | Age: 69
End: 2024-01-09
Payer: MEDICARE

## 2024-01-09 DIAGNOSIS — Z79.01 WARFARIN ANTICOAGULATION: ICD-10-CM

## 2024-01-09 DIAGNOSIS — Z95.2 STATUS POST MITRAL VALVE REPLACEMENT: Primary | ICD-10-CM

## 2024-01-09 LAB
INR PPP: 4.3 (ref 0.91–1.09)
PROTHROMBIN TIME: 51.8 SECONDS (ref 10–13.8)

## 2024-01-09 PROCEDURE — 85610 PROTHROMBIN TIME: CPT

## 2024-01-09 PROCEDURE — G0463 HOSPITAL OUTPT CLINIC VISIT: HCPCS

## 2024-01-09 PROCEDURE — 36416 COLLJ CAPILLARY BLOOD SPEC: CPT

## 2024-01-09 NOTE — PROGRESS NOTES
Anticoagulation Clinic Progress Note    Anticoagulation Summary  As of 2024      INR goal:  2.5-3.5   TTR:  73.1% (5.3 y)   INR used for dosin.3 (2024)   Warfarin maintenance plan:  5 mg every e, Thu; 7.5 mg all other days   Weekly warfarin total:  47.5 mg   Plan last modified:  Nickie Landin RPH (2023)   Next INR check:  2024   Priority:  Maintenance   Target end date:  Indefinite    Indications    Status post mitral valve replacement [Z95.2]  Warfarin anticoagulation [Z79.01]                 Anticoagulation Episode Summary       INR check location:      Preferred lab:      Send INR reminders to:   [facility] [place]    Comments:            Anticoagulation Care Providers       Provider Role Specialty Phone number    Lindsey Vargas MD Referring Cardiology [phone]            Clinic Interview:  Patient Findings     Positives:  Change in health, Other complaints    Negatives:  Signs/symptoms of thrombosis, Signs/symptoms of bleeding,   Laboratory test error suspected, Change in alcohol use, Change in   activity, Upcoming invasive procedure, Emergency department visit,   Upcoming dental procedure, Missed doses, Extra doses, Change in   medications, Change in diet/appetite, Hospital admission, Bruising    Comments:  Patient is not taking prescribed antibiotics for UTI. She   states symptoms have mostly cleared. Patient is under a lot of   stress/worry due to annual eye exam abnormality, which they told her could   potentially be cancer. Patient has upcoming confirmation appointment.      Clinical Outcomes     Negatives:  Major bleeding event, Thromboembolic event,   Anticoagulation-related hospital admission, Anticoagulation-related ED   visit, Anticoagulation-related fatality    Comments:  Patient is not taking prescribed antibiotics for UTI. She   states symptoms have mostly cleared. Patient is under a lot of   stress/worry due to annual eye exam abnormality, which they  told her could   potentially be cancer. Patient has upcoming confirmation appointment.        INR History:      Latest Ref Rng & Units 10/26/2023     8:45 AM 11/2/2023     1:18 PM 11/8/2023     9:15 AM 12/6/2023     9:15 AM 12/22/2023     9:15 AM 12/26/2023    10:15 AM 1/9/2024    10:15 AM   Anticoagulation Monitoring   INR  3.2  3.1 3.4 1.5 3.5 4.3   INR Date  10/26/2023  11/8/2023 12/6/2023 12/22/2023 12/26/2023 1/9/2024   INR Goal  2.5-3.5  2.5-3.5 2.5-3.5 2.5-3.5 2.5-3.5 2.5-3.5   Trend  Same  Same Same Same Same Same   Last Week Total  47.5 mg  47.5 mg 47.5 mg 40 mg 47.5 mg 47.5 mg   Next Week Total  47.5 mg  47.5 mg 47.5 mg 55 mg 47.5 mg 42.5 mg   Sun  7.5 mg  7.5 mg 7.5 mg 7.5 mg 7.5 mg 7.5 mg   Mon  7.5 mg  7.5 mg 7.5 mg 7.5 mg 7.5 mg 7.5 mg   Tue  5 mg  5 mg 5 mg - 5 mg Hold (1/9); Otherwise 5 mg   Wed  7.5 mg  7.5 mg 7.5 mg - 7.5 mg 7.5 mg   Thu  5 mg  5 mg 5 mg - 5 mg 5 mg   Fri  7.5 mg  7.5 mg 7.5 mg 12.5 mg (12/22) 7.5 mg 7.5 mg   Sat  7.5 mg  7.5 mg 7.5 mg 10 mg (12/23) 7.5 mg 7.5 mg   Historical INR 0.90 - 1.10  2.74             Plan:  1. INR is Supratherapeutic today- see above in Anticoagulation Summary.  Will instruct Yana Lehman to hold their warfarin today and then continue their usual regimen of 5mg on Tuesday and Thursday and 7.5mg on all other days - see above in Anticoagulation Summary.  2. Follow up in 2 weeks  3. Patient declines warfarin refills.  4. Verbal and written information provided. Patient expresses understanding and has no further questions at this time.    Josefina Mcginnis, Pharmacy Intern

## 2024-01-09 NOTE — PROGRESS NOTES
I have supervised and reviewed the notes, assessments, and/or procedures performed. The documented assessment and plan were developed cooperatively, and the plan was implemented in my presence. I concur with the documentation of this patient encounter.    Nickie Landin, [facility]

## 2024-01-23 ENCOUNTER — ANTICOAGULATION VISIT (OUTPATIENT)
Dept: PHARMACY | Facility: HOSPITAL | Age: 69
End: 2024-01-23
Payer: MEDICARE

## 2024-01-23 DIAGNOSIS — Z79.01 WARFARIN ANTICOAGULATION: ICD-10-CM

## 2024-01-23 DIAGNOSIS — Z95.2 STATUS POST MITRAL VALVE REPLACEMENT: Primary | ICD-10-CM

## 2024-01-23 LAB
INR PPP: 3.9 (ref 0.91–1.09)
PROTHROMBIN TIME: 46.5 SECONDS (ref 10–13.8)

## 2024-01-23 PROCEDURE — 85610 PROTHROMBIN TIME: CPT

## 2024-01-23 PROCEDURE — G0463 HOSPITAL OUTPT CLINIC VISIT: HCPCS

## 2024-01-23 PROCEDURE — 36416 COLLJ CAPILLARY BLOOD SPEC: CPT

## 2024-01-23 NOTE — PROGRESS NOTES
I have supervised and reviewed the notes, assessments, and/or procedures performed. The documented assessment and plan were developed cooperatively, and the plan was implemented in my presence. I concur with the documentation of this patient encounter.    Nickie Landin, [facility]

## 2024-01-23 NOTE — PROGRESS NOTES
Anticoagulation Clinic Progress Note    Anticoagulation Summary  As of 1/23/2024      INR goal:  2.5-3.5   TTR:  72.5% (5.3 y)   INR used for dosing:  3.9 (1/23/2024)   Warfarin maintenance plan:  5 mg every Tue, Thu, Sat; 7.5 mg all other days   Weekly warfarin total:  45 mg   Plan last modified:  Nickie Landin RPH (1/23/2024)   Next INR check:  2/6/2024   Priority:  Maintenance   Target end date:  Indefinite    Indications    Status post mitral valve replacement [Z95.2]  Warfarin anticoagulation [Z79.01]                 Anticoagulation Episode Summary       INR check location:      Preferred lab:      Send INR reminders to:   [facility] [place]    Comments:            Anticoagulation Care Providers       Provider Role Specialty Phone number    Lindsey Vargas MD Referring Cardiology [phone]            Clinic Interview:  Patient Findings     Positives:  Upcoming invasive procedure, Other complaints    Negatives:  Signs/symptoms of thrombosis, Signs/symptoms of bleeding,   Laboratory test error suspected, Change in health, Change in alcohol use,   Change in activity, Emergency department visit, Upcoming dental procedure,   Missed doses, Extra doses, Change in medications, Change in diet/appetite,   Hospital admission, Bruising    Comments:  Patient has cystoscopy later today, and they told her not to   hold warfarin. She has eye exam Thursday to discuss abnormality.      Clinical Outcomes     Negatives:  Major bleeding event, Thromboembolic event,   Anticoagulation-related hospital admission, Anticoagulation-related ED   visit, Anticoagulation-related fatality    Comments:  Patient has cystoscopy later today, and they told her not to   hold warfarin. She has eye exam Thursday to discuss abnormality.        INR History:      Latest Ref Rng & Units 11/2/2023     1:18 PM 11/8/2023     9:15 AM 12/6/2023     9:15 AM 12/22/2023     9:15 AM 12/26/2023    10:15 AM 1/9/2024    10:15 AM 1/23/2024     10:30 AM   Anticoagulation Monitoring   INR   3.1 3.4 1.5 3.5 4.3 3.9   INR Date   11/8/2023 12/6/2023 12/22/2023 12/26/2023 1/9/2024 1/23/2024   INR Goal   2.5-3.5 2.5-3.5 2.5-3.5 2.5-3.5 2.5-3.5 2.5-3.5   Trend   Same Same Same Same Same Down   Last Week Total   47.5 mg 47.5 mg 40 mg 47.5 mg 47.5 mg 47.5 mg   Next Week Total   47.5 mg 47.5 mg 55 mg 47.5 mg 42.5 mg 42.5 mg   Sun   7.5 mg 7.5 mg 7.5 mg 7.5 mg 7.5 mg 7.5 mg   Mon   7.5 mg 7.5 mg 7.5 mg 7.5 mg 7.5 mg 7.5 mg   Tue   5 mg 5 mg - 5 mg Hold (1/9); Otherwise 5 mg 2.5 mg (1/23); Otherwise 5 mg   Wed   7.5 mg 7.5 mg - 7.5 mg 7.5 mg 7.5 mg   Thu   5 mg 5 mg - 5 mg 5 mg 5 mg   Fri   7.5 mg 7.5 mg 12.5 mg (12/22) 7.5 mg 7.5 mg 7.5 mg   Sat   7.5 mg 7.5 mg 10 mg (12/23) 7.5 mg 7.5 mg 5 mg   Historical INR 0.90 - 1.10 2.74              Plan:  1. INR is Supratherapeutic today- see above in Anticoagulation Summary.  Will instruct Yana Lehman to take a partial dose today of 2.5mg and then decrease their warfarin regimen to 5mg on Tuesday, Thursday, Saturday and 7.5mg on all other days- see above in Anticoagulation Summary.  2. Follow up in 2 weeks  3. Patient declines warfarin refills.  4. Verbal and written information provided. Patient expresses understanding and has no further questions at this time.    Josefina Mcginnis, Pharmacy Intern

## 2024-02-06 ENCOUNTER — ANTICOAGULATION VISIT (OUTPATIENT)
Dept: PHARMACY | Facility: HOSPITAL | Age: 69
End: 2024-02-06
Payer: MEDICARE

## 2024-02-06 DIAGNOSIS — Z79.01 WARFARIN ANTICOAGULATION: ICD-10-CM

## 2024-02-06 DIAGNOSIS — Z95.2 STATUS POST MITRAL VALVE REPLACEMENT: Primary | ICD-10-CM

## 2024-02-06 LAB
INR PPP: 2.7 (ref 0.91–1.09)
PROTHROMBIN TIME: 31.8 SECONDS (ref 10–13.8)

## 2024-02-06 PROCEDURE — 36416 COLLJ CAPILLARY BLOOD SPEC: CPT

## 2024-02-06 PROCEDURE — 85610 PROTHROMBIN TIME: CPT

## 2024-02-06 PROCEDURE — G0463 HOSPITAL OUTPT CLINIC VISIT: HCPCS

## 2024-02-06 NOTE — PROGRESS NOTES
Anticoagulation Clinic Progress Note    Anticoagulation Summary  As of 2024      INR goal:  2.5-3.5   TTR:  72.5% (5.3 y)   INR used for dosin.7 (2024)   Warfarin maintenance plan:  5 mg every Tue, Thu, Sat; 7.5 mg all other days   Weekly warfarin total:  45 mg   Plan last modified:  Nickie Landin RPH (2024)   Next INR check:  2024   Priority:  Maintenance   Target end date:  Indefinite    Indications    Status post mitral valve replacement [Z95.2]  Warfarin anticoagulation [Z79.01]                 Anticoagulation Episode Summary       INR check location:      Preferred lab:      Send INR reminders to:   [facility] [place]    Comments:            Anticoagulation Care Providers       Provider Role Specialty Phone number    Lindsey Vargas MD Referring Cardiology [phone]            Clinic Interview:  Patient Findings     Negatives:  Signs/symptoms of thrombosis, Signs/symptoms of bleeding,   Laboratory test error suspected, Change in health, Change in alcohol use,   Change in activity, Upcoming invasive procedure, Emergency department   visit, Upcoming dental procedure, Missed doses, Extra doses, Change in   medications, Change in diet/appetite, Hospital admission, Bruising, Other   complaints      Clinical Outcomes     Negatives:  Major bleeding event, Thromboembolic event,   Anticoagulation-related hospital admission, Anticoagulation-related ED   visit, Anticoagulation-related fatality        INR History:      2023     9:15 AM 2023     9:15 AM 2023     9:15 AM 2023    10:15 AM 2024    10:15 AM 2024    10:30 AM 2024    10:30 AM   Anticoagulation Monitoring   INR 3.1 3.4 1.5 3.5 4.3 3.9 2.7   INR Date 2023   INR Goal 2.5-3.5 2.5-3.5 2.5-3.5 2.5-3.5 2.5-3.5 2.5-3.5 2.5-3.5   Trend Same Same Same Same Same Down Same   Last Week Total 47.5 mg 47.5 mg 40 mg 47.5 mg 47.5 mg 47.5  mg 45 mg   Next Week Total 47.5 mg 47.5 mg 55 mg 47.5 mg 42.5 mg 42.5 mg 45 mg   Sun 7.5 mg 7.5 mg 7.5 mg 7.5 mg 7.5 mg 7.5 mg 7.5 mg   Mon 7.5 mg 7.5 mg 7.5 mg 7.5 mg 7.5 mg 7.5 mg 7.5 mg   Tue 5 mg 5 mg - 5 mg Hold (1/9); Otherwise 5 mg 2.5 mg (1/23); Otherwise 5 mg 5 mg   Wed 7.5 mg 7.5 mg - 7.5 mg 7.5 mg 7.5 mg 7.5 mg   Thu 5 mg 5 mg - 5 mg 5 mg 5 mg 5 mg   Fri 7.5 mg 7.5 mg 12.5 mg (12/22) 7.5 mg 7.5 mg 7.5 mg 7.5 mg   Sat 7.5 mg 7.5 mg 10 mg (12/23) 7.5 mg 7.5 mg 5 mg 5 mg       Plan:  1. INR is Therapeutic today- see above in Anticoagulation Summary.  Will instruct Yana Lehman to continue their current warfarin regimen- see above in Anticoagulation Summary.  2. Follow up in 2 weeks  3. Patient declines warfarin refills.  4. Verbal and written information provided. Patient expresses understanding and has no further questions at this time.    Kirstin Edwards [facility]

## 2024-02-20 ENCOUNTER — ANTICOAGULATION VISIT (OUTPATIENT)
Dept: PHARMACY | Facility: HOSPITAL | Age: 69
End: 2024-02-20
Payer: MEDICARE

## 2024-02-20 DIAGNOSIS — Z95.2 STATUS POST MITRAL VALVE REPLACEMENT: Primary | ICD-10-CM

## 2024-02-20 DIAGNOSIS — Z79.01 WARFARIN ANTICOAGULATION: ICD-10-CM

## 2024-02-20 LAB
INR PPP: 2.5 (ref 0.91–1.09)
PROTHROMBIN TIME: 30 SECONDS (ref 10–13.8)

## 2024-02-20 PROCEDURE — G0463 HOSPITAL OUTPT CLINIC VISIT: HCPCS

## 2024-02-20 PROCEDURE — 36416 COLLJ CAPILLARY BLOOD SPEC: CPT

## 2024-02-20 PROCEDURE — 85610 PROTHROMBIN TIME: CPT

## 2024-02-20 NOTE — PROGRESS NOTES
Anticoagulation Clinic Progress Note    Anticoagulation Summary  As of 2024      INR goal:  2.5-3.5   TTR:  72.7% (5.4 y)   INR used for dosin.5 (2024)   Warfarin maintenance plan:  5 mg every Tue, Thu, Sat; 7.5 mg all other days   Weekly warfarin total:  45 mg   No change documented:  Emely Greene   Plan last modified:  Nickie Landin RPH (2024)   Next INR check:  3/19/2024   Priority:  Maintenance   Target end date:  Indefinite    Indications    Status post mitral valve replacement [Z95.2]  Warfarin anticoagulation [Z79.01]                 Anticoagulation Episode Summary       INR check location:      Preferred lab:      Send INR reminders to:  [facility]    Comments:            Anticoagulation Care Providers       Provider Role Specialty Phone number    Lindsey Vargas MD Referring Cardiology [phone]            Clinic Interview:  Patient Findings     Negatives:  Signs/symptoms of thrombosis, Signs/symptoms of bleeding,   Laboratory test error suspected, Change in health, Change in alcohol use,   Change in activity, Upcoming invasive procedure, Emergency department   visit, Upcoming dental procedure, Missed doses, Extra doses, Change in   medications, Change in diet/appetite, Hospital admission, Bruising, Other   complaints      Clinical Outcomes     Negatives:  Major bleeding event, Thromboembolic event,   Anticoagulation-related hospital admission, Anticoagulation-related ED   visit, Anticoagulation-related fatality        INR History:      2023     9:15 AM 2023     9:15 AM 2023    10:15 AM 2024    10:15 AM 2024    10:30 AM 2024    10:30 AM 2024    10:30 AM   Anticoagulation Monitoring   INR 3.4 1.5 3.5 4.3 3.9 2.7 2.5   INR Date 2023   INR Goal 2.5-3.5 2.5-3.5 2.5-3.5 2.5-3.5 2.5-3.5 2.5-3.5 2.5-3.5   Trend Same Same Same Same Down Same Same   Last  Week Total 47.5 mg 40 mg 47.5 mg 47.5 mg 47.5 mg 45 mg 45 mg   Next Week Total 47.5 mg 55 mg 47.5 mg 42.5 mg 42.5 mg 45 mg 45 mg   Sun 7.5 mg 7.5 mg 7.5 mg 7.5 mg 7.5 mg 7.5 mg 7.5 mg   Mon 7.5 mg 7.5 mg 7.5 mg 7.5 mg 7.5 mg 7.5 mg 7.5 mg   Tue 5 mg - 5 mg Hold (1/9); Otherwise 5 mg 2.5 mg (1/23); Otherwise 5 mg 5 mg 5 mg   Wed 7.5 mg - 7.5 mg 7.5 mg 7.5 mg 7.5 mg 7.5 mg   Thu 5 mg - 5 mg 5 mg 5 mg 5 mg 5 mg   Fri 7.5 mg 12.5 mg (12/22) 7.5 mg 7.5 mg 7.5 mg 7.5 mg 7.5 mg   Sat 7.5 mg 10 mg (12/23) 7.5 mg 7.5 mg 5 mg 5 mg 5 mg       Plan:  1. INR is therapeutic today- see above in Anticoagulation Summary.   Will instruct Yana Lehman to continue their warfarin regimen- see above in Anticoagulation Summary.  2. Follow up in 4 weeks.  3. Patient declines warfarin refills.  4. Verbal and written information provided. Patient expresses understanding and has no further questions at this time.    Emely Greene

## 2024-02-20 NOTE — PROGRESS NOTES
I have supervised and reviewed the notes, assessments, and/or procedures performed. I personally performed the assessment and implemented the plan. I concur with the documentation of this patient encounter.    Nickie Landin, [facility]

## 2024-03-19 ENCOUNTER — ANTICOAGULATION VISIT (OUTPATIENT)
Dept: PHARMACY | Facility: HOSPITAL | Age: 69
End: 2024-03-19
Payer: MEDICAID

## 2024-03-19 DIAGNOSIS — Z95.2 STATUS POST MITRAL VALVE REPLACEMENT: Primary | ICD-10-CM

## 2024-03-19 DIAGNOSIS — Z79.01 WARFARIN ANTICOAGULATION: ICD-10-CM

## 2024-03-19 LAB
INR PPP: 4 (ref 0.91–1.09)
PROTHROMBIN TIME: 48.5 SECONDS (ref 10–13.8)

## 2024-03-19 PROCEDURE — 85610 PROTHROMBIN TIME: CPT

## 2024-03-19 PROCEDURE — 36416 COLLJ CAPILLARY BLOOD SPEC: CPT

## 2024-03-19 PROCEDURE — G0463 HOSPITAL OUTPT CLINIC VISIT: HCPCS

## 2024-03-19 NOTE — PROGRESS NOTES
Anticoagulation Clinic Progress Note    Anticoagulation Summary  As of 3/19/2024      INR goal:  2.5-3.5   TTR:  72.6% (5.5 y)   INR used for dosin.0 (3/19/2024)   Warfarin maintenance plan:  5 mg every Tue, Thu, Sat; 7.5 mg all other days   Weekly warfarin total:  45 mg   Plan last modified:  Nickie Landin RPH (2024)   Next INR check:  2024   Priority:  Maintenance   Target end date:  Indefinite    Indications    Status post mitral valve replacement [Z95.2]  Warfarin anticoagulation [Z79.01]                 Anticoagulation Episode Summary       INR check location:      Preferred lab:      Send INR reminders to:   [facility]    Comments:            Anticoagulation Care Providers       Provider Role Specialty Phone number    Lindsey Vargas MD Referring Cardiology [phone]            Clinic Interview:  Patient Findings     Positives:  Change in medications, Change in diet/appetite    Negatives:  Signs/symptoms of thrombosis, Signs/symptoms of bleeding,   Laboratory test error suspected, Change in health, Change in alcohol use,   Change in activity, Upcoming invasive procedure, Emergency department   visit, Upcoming dental procedure, Missed doses, Extra doses, Hospital   admission, Bruising, Other complaints    Comments:  dried cranberries and mangos for three days. Starting fosamax   today.       Clinical Outcomes     Negatives:  Major bleeding event, Thromboembolic event,   Anticoagulation-related hospital admission, Anticoagulation-related ED   visit, Anticoagulation-related fatality    Comments:  dried cranberries and mangos for three days. Starting fosamax   today.         INR History:      2023     9:15 AM 2023    10:15 AM 2024    10:15 AM 2024    10:30 AM 2024    10:30 AM 2024    10:30 AM 3/19/2024     9:15 AM   Anticoagulation Monitoring   INR 1.5 3.5 4.3 3.9 2.7 2.5 4.0   INR Date 2023  2/20/2024 3/19/2024   INR Goal 2.5-3.5 2.5-3.5 2.5-3.5 2.5-3.5 2.5-3.5 2.5-3.5 2.5-3.5   Trend Same Same Same Down Same Same Same   Last Week Total 40 mg 47.5 mg 47.5 mg 47.5 mg 45 mg 45 mg 45 mg   Next Week Total 55 mg 47.5 mg 42.5 mg 42.5 mg 45 mg 45 mg 40 mg   Sun 7.5 mg 7.5 mg 7.5 mg 7.5 mg 7.5 mg 7.5 mg 7.5 mg   Mon 7.5 mg 7.5 mg 7.5 mg 7.5 mg 7.5 mg 7.5 mg 7.5 mg   Tue - 5 mg Hold (1/9); Otherwise 5 mg 2.5 mg (1/23); Otherwise 5 mg 5 mg 5 mg Hold (3/19); Otherwise 5 mg   Wed - 7.5 mg 7.5 mg 7.5 mg 7.5 mg 7.5 mg 7.5 mg   Thu - 5 mg 5 mg 5 mg 5 mg 5 mg 5 mg   Fri 12.5 mg (12/22) 7.5 mg 7.5 mg 7.5 mg 7.5 mg 7.5 mg 7.5 mg   Sat 10 mg (12/23) 7.5 mg 7.5 mg 5 mg 5 mg 5 mg 5 mg       Plan:  1. INR is Supratherapeutic today- see above in Anticoagulation Summary.  Will instruct Yana Lehman to Change their warfarin regimen- see above in Anticoagulation Summary.    Hold and recheck in 2 weeks   2. Follow up in 2 weeks  3. Patient declines warfarin refills.  4. Verbal and written information provided. Patient expresses understanding and has no further questions at this time.    Nickie Landin [facility]

## 2024-03-29 RX ORDER — WARFARIN SODIUM 5 MG/1
TABLET ORAL
Qty: 120 TABLET | Refills: 1 | Status: SHIPPED | OUTPATIENT
Start: 2024-03-29

## 2024-04-02 ENCOUNTER — ANTICOAGULATION VISIT (OUTPATIENT)
Dept: PHARMACY | Facility: HOSPITAL | Age: 69
End: 2024-04-02
Payer: MEDICARE

## 2024-04-02 DIAGNOSIS — Z79.01 WARFARIN ANTICOAGULATION: ICD-10-CM

## 2024-04-02 DIAGNOSIS — Z95.2 STATUS POST MITRAL VALVE REPLACEMENT: Primary | ICD-10-CM

## 2024-04-02 LAB
INR PPP: 3.4 (ref 0.91–1.09)
PROTHROMBIN TIME: 40.6 SECONDS (ref 10–13.8)

## 2024-04-02 PROCEDURE — 36416 COLLJ CAPILLARY BLOOD SPEC: CPT

## 2024-04-02 PROCEDURE — 85610 PROTHROMBIN TIME: CPT

## 2024-04-02 PROCEDURE — G0463 HOSPITAL OUTPT CLINIC VISIT: HCPCS

## 2024-04-02 NOTE — PROGRESS NOTES
I have supervised and reviewed the notes, assessments, and/or procedures performed. I concur with the documentation of this patient encounter.    Grady Salazar, PharmD

## 2024-04-02 NOTE — PROGRESS NOTES
Anticoagulation Clinic Progress Note    Anticoagulation Summary  As of 4/2/2024      INR goal:  2.5-3.5   TTR:  72.2% (5.5 y)   INR used for dosing:  3.4 (4/2/2024)   Warfarin maintenance plan:  5 mg every Tue, Thu, Sat; 7.5 mg all other days   Weekly warfarin total:  45 mg   No change documented:  Autumn Santana, PharmD   Plan last modified:  Nickie Landin RPH (1/23/2024)   Next INR check:  4/16/2024   Priority:  Maintenance   Target end date:  Indefinite    Indications    Status post mitral valve replacement [Z95.2]  Warfarin anticoagulation [Z79.01]                 Anticoagulation Episode Summary       INR check location:      Preferred lab:      Send INR reminders to:  [facility]    Comments:            Anticoagulation Care Providers       Provider Role Specialty Phone number    Lindsey Vargas MD Referring Cardiology [phone]            Clinic Interview:  Patient Findings     Negatives:  Signs/symptoms of thrombosis, Signs/symptoms of bleeding,   Laboratory test error suspected, Change in health, Change in alcohol use,   Change in activity, Upcoming invasive procedure, Emergency department   visit, Upcoming dental procedure, Missed doses, Extra doses, Change in   medications, Change in diet/appetite, Hospital admission, Bruising, Other   complaints      Clinical Outcomes     Negatives:  Major bleeding event, Thromboembolic event,   Anticoagulation-related hospital admission, Anticoagulation-related ED   visit, Anticoagulation-related fatality        INR History:      12/26/2023    10:15 AM 1/9/2024    10:15 AM 1/23/2024    10:30 AM 2/6/2024    10:30 AM 2/20/2024    10:30 AM 3/19/2024     9:15 AM 4/2/2024     9:15 AM   Anticoagulation Monitoring   INR 3.5 4.3 3.9 2.7 2.5 4.0 3.4   INR Date 12/26/2023 1/9/2024 1/23/2024 2/6/2024 2/20/2024 3/19/2024 4/2/2024   INR Goal 2.5-3.5 2.5-3.5 2.5-3.5 2.5-3.5 2.5-3.5 2.5-3.5 2.5-3.5   Trend Same Same Down Same Same Same Same   Last Week  Total 47.5 mg 47.5 mg 47.5 mg 45 mg 45 mg 45 mg 45 mg   Next Week Total 47.5 mg 42.5 mg 42.5 mg 45 mg 45 mg 40 mg 45 mg   Sun 7.5 mg 7.5 mg 7.5 mg 7.5 mg 7.5 mg 7.5 mg 7.5 mg   Mon 7.5 mg 7.5 mg 7.5 mg 7.5 mg 7.5 mg 7.5 mg 7.5 mg   Tue 5 mg Hold (1/9); Otherwise 5 mg 2.5 mg (1/23); Otherwise 5 mg 5 mg 5 mg Hold (3/19); Otherwise 5 mg 5 mg   Wed 7.5 mg 7.5 mg 7.5 mg 7.5 mg 7.5 mg 7.5 mg 7.5 mg   Thu 5 mg 5 mg 5 mg 5 mg 5 mg 5 mg 5 mg   Fri 7.5 mg 7.5 mg 7.5 mg 7.5 mg 7.5 mg 7.5 mg 7.5 mg   Sat 7.5 mg 7.5 mg 5 mg 5 mg 5 mg 5 mg 5 mg       Plan:  1. INR is Therapeutic today- see above in Anticoagulation Summary.  Will instruct Yana Lehman to Continue their warfarin regimen- see above in Anticoagulation Summary.  2. Follow up in 2 weeks.  3. Patient declines warfarin refills.  4. Verbal and written information provided. Patient expresses understanding and has no further questions at this time.    Autumn Santana, PharmD

## 2024-04-17 ENCOUNTER — ANTICOAGULATION VISIT (OUTPATIENT)
Dept: PHARMACY | Facility: HOSPITAL | Age: 69
End: 2024-04-17
Payer: MEDICARE

## 2024-04-17 DIAGNOSIS — Z79.01 WARFARIN ANTICOAGULATION: ICD-10-CM

## 2024-04-17 DIAGNOSIS — Z95.2 STATUS POST MITRAL VALVE REPLACEMENT: Primary | ICD-10-CM

## 2024-04-17 LAB
INR PPP: 3.3 (ref 0.91–1.09)
PROTHROMBIN TIME: 39.5 SECONDS (ref 10–13.8)

## 2024-04-17 PROCEDURE — G0463 HOSPITAL OUTPT CLINIC VISIT: HCPCS

## 2024-04-17 PROCEDURE — 36416 COLLJ CAPILLARY BLOOD SPEC: CPT

## 2024-04-17 PROCEDURE — 85610 PROTHROMBIN TIME: CPT

## 2024-04-17 NOTE — PROGRESS NOTES
I have supervised and reviewed the notes, assessments, and/or procedures performed. The documented assessment and plan were developed cooperatively. I concur with the documentation of this patient encounter.    Grady Salazar, PharmD

## 2024-04-17 NOTE — PROGRESS NOTES
Anticoagulation Clinic Progress Note    Anticoagulation Summary  As of 4/17/2024      INR goal:  2.5-3.5   TTR:  72.4% (5.5 y)   INR used for dosing:  3.3 (4/17/2024)   Warfarin maintenance plan:  5 mg every Tue, Thu, Sat; 7.5 mg all other days   Weekly warfarin total:  45 mg   No change documented:  Autumn Santana, PharmD   Plan last modified:  Nickie Landin RPH (1/23/2024)   Next INR check:  5/15/2024   Priority:  Maintenance   Target end date:  Indefinite    Indications    Status post mitral valve replacement [Z95.2]  Warfarin anticoagulation [Z79.01]                 Anticoagulation Episode Summary       INR check location:      Preferred lab:      Send INR reminders to:  [facility]    Comments:            Anticoagulation Care Providers       Provider Role Specialty Phone number    Lindsey Vargas MD Referring Cardiology [phone]            Clinic Interview:  Patient Findings     Negatives:  Signs/symptoms of thrombosis, Signs/symptoms of bleeding,   Laboratory test error suspected, Change in health, Change in alcohol use,   Change in activity, Upcoming invasive procedure, Emergency department   visit, Upcoming dental procedure, Missed doses, Extra doses, Change in   medications, Change in diet/appetite, Hospital admission, Bruising, Other   complaints      Clinical Outcomes     Negatives:  Major bleeding event, Thromboembolic event,   Anticoagulation-related hospital admission, Anticoagulation-related ED   visit, Anticoagulation-related fatality        INR History:      1/9/2024    10:15 AM 1/23/2024    10:30 AM 2/6/2024    10:30 AM 2/20/2024    10:30 AM 3/19/2024     9:15 AM 4/2/2024     9:15 AM 4/17/2024     9:30 AM   Anticoagulation Monitoring   INR 4.3 3.9 2.7 2.5 4.0 3.4 3.3   INR Date 1/9/2024 1/23/2024 2/6/2024 2/20/2024 3/19/2024 4/2/2024 4/17/2024   INR Goal 2.5-3.5 2.5-3.5 2.5-3.5 2.5-3.5 2.5-3.5 2.5-3.5 2.5-3.5   Trend Same Down Same Same Same Same Same   Last Week  Total 47.5 mg 47.5 mg 45 mg 45 mg 45 mg 45 mg 45 mg   Next Week Total 42.5 mg 42.5 mg 45 mg 45 mg 40 mg 45 mg 45 mg   Sun 7.5 mg 7.5 mg 7.5 mg 7.5 mg 7.5 mg 7.5 mg 7.5 mg   Mon 7.5 mg 7.5 mg 7.5 mg 7.5 mg 7.5 mg 7.5 mg 7.5 mg   Tue Hold (1/9); Otherwise 5 mg 2.5 mg (1/23); Otherwise 5 mg 5 mg 5 mg Hold (3/19); Otherwise 5 mg 5 mg 5 mg   Wed 7.5 mg 7.5 mg 7.5 mg 7.5 mg 7.5 mg 7.5 mg 7.5 mg   Thu 5 mg 5 mg 5 mg 5 mg 5 mg 5 mg 5 mg   Fri 7.5 mg 7.5 mg 7.5 mg 7.5 mg 7.5 mg 7.5 mg 7.5 mg   Sat 7.5 mg 5 mg 5 mg 5 mg 5 mg 5 mg 5 mg       Plan:  1. INR is Therapeutic today- see above in Anticoagulation Summary.  Will instruct Yana Lehman to Continue their warfarin regimen- see above in Anticoagulation Summary.  2. Follow up in 1 month  3. Patient declines warfarin refills.  4. Verbal and written information provided. Patient expresses understanding and has no further questions at this time.    Autumn Santana, PharmD

## 2024-05-15 ENCOUNTER — ANTICOAGULATION VISIT (OUTPATIENT)
Dept: PHARMACY | Facility: HOSPITAL | Age: 69
End: 2024-05-15
Payer: MEDICARE

## 2024-05-15 DIAGNOSIS — Z95.2 STATUS POST MITRAL VALVE REPLACEMENT: Primary | ICD-10-CM

## 2024-05-15 DIAGNOSIS — Z79.01 WARFARIN ANTICOAGULATION: ICD-10-CM

## 2024-05-15 LAB
INR PPP: 3.5 (ref 0.91–1.09)
PROTHROMBIN TIME: 42.4 SECONDS (ref 10–13.8)

## 2024-05-15 PROCEDURE — 36416 COLLJ CAPILLARY BLOOD SPEC: CPT

## 2024-05-15 PROCEDURE — 85610 PROTHROMBIN TIME: CPT

## 2024-05-15 PROCEDURE — G0463 HOSPITAL OUTPT CLINIC VISIT: HCPCS

## 2024-05-15 NOTE — PROGRESS NOTES
I have supervised and reviewed the notes, assessments, and/or procedures performed. I personally performed the assessment and implemented the plan. I concur with the documentation of this patient encounter.    Nickie Landin, [facility]

## 2024-05-15 NOTE — PROGRESS NOTES
Anticoagulation Clinic Progress Note    Anticoagulation Summary  As of 5/15/2024      INR goal:  2.5-3.5   TTR:  72.8% (5.6 y)   INR used for dosing:  3.5 (5/15/2024)   Warfarin maintenance plan:  5 mg every Tue, Thu, Sat; 7.5 mg all other days   Weekly warfarin total:  45 mg   No change documented:  Emely Greene   Plan last modified:  Nickie Landin RPH (1/23/2024)   Next INR check:  6/12/2024   Priority:  Maintenance   Target end date:  Indefinite    Indications    Status post mitral valve replacement [Z95.2]  Warfarin anticoagulation [Z79.01]                 Anticoagulation Episode Summary       INR check location:      Preferred lab:      Send INR reminders to:  [facility]    Comments:            Anticoagulation Care Providers       Provider Role Specialty Phone number    Lindsey Vargas MD Referring Cardiology [phone]            Clinic Interview:  Patient Findings     Negatives:  Signs/symptoms of thrombosis, Signs/symptoms of bleeding,   Laboratory test error suspected, Change in health, Change in alcohol use,   Change in activity, Upcoming invasive procedure, Emergency department   visit, Upcoming dental procedure, Missed doses, Extra doses, Change in   medications, Change in diet/appetite, Hospital admission, Bruising, Other   complaints      Clinical Outcomes     Negatives:  Major bleeding event, Thromboembolic event,   Anticoagulation-related hospital admission, Anticoagulation-related ED   visit, Anticoagulation-related fatality        INR History:      1/23/2024    10:30 AM 2/6/2024    10:30 AM 2/20/2024    10:30 AM 3/19/2024     9:15 AM 4/2/2024     9:15 AM 4/17/2024     9:30 AM 5/15/2024     9:30 AM   Anticoagulation Monitoring   INR 3.9 2.7 2.5 4.0 3.4 3.3 3.5   INR Date 1/23/2024 2/6/2024 2/20/2024 3/19/2024 4/2/2024 4/17/2024 5/15/2024   INR Goal 2.5-3.5 2.5-3.5 2.5-3.5 2.5-3.5 2.5-3.5 2.5-3.5 2.5-3.5   Trend Down Same Same Same Same Same Same   Last Week  Total 47.5 mg 45 mg 45 mg 45 mg 45 mg 45 mg 45 mg   Next Week Total 42.5 mg 45 mg 45 mg 40 mg 45 mg 45 mg 45 mg   Sun 7.5 mg 7.5 mg 7.5 mg 7.5 mg 7.5 mg 7.5 mg 7.5 mg   Mon 7.5 mg 7.5 mg 7.5 mg 7.5 mg 7.5 mg 7.5 mg 7.5 mg   Tue 2.5 mg (1/23); Otherwise 5 mg 5 mg 5 mg Hold (3/19); Otherwise 5 mg 5 mg 5 mg 5 mg   Wed 7.5 mg 7.5 mg 7.5 mg 7.5 mg 7.5 mg 7.5 mg 7.5 mg   Thu 5 mg 5 mg 5 mg 5 mg 5 mg 5 mg 5 mg   Fri 7.5 mg 7.5 mg 7.5 mg 7.5 mg 7.5 mg 7.5 mg 7.5 mg   Sat 5 mg 5 mg 5 mg 5 mg 5 mg 5 mg 5 mg       Plan:  1. INR is therapeutic today- see above in Anticoagulation Summary.   Will instruct Yana Lehman to continue their warfarin regimen- see above in Anticoagulation Summary.  2. Follow up in 4 weeks.  3. Patient declines warfarin refills.  4. Verbal and written information provided. Patient expresses understanding and has no further questions at this time.    Emely Greene     26-Jan-2023

## 2024-06-12 ENCOUNTER — ANTICOAGULATION VISIT (OUTPATIENT)
Dept: PHARMACY | Facility: HOSPITAL | Age: 69
End: 2024-06-12
Payer: MEDICARE

## 2024-06-12 DIAGNOSIS — Z79.01 WARFARIN ANTICOAGULATION: ICD-10-CM

## 2024-06-12 DIAGNOSIS — Z95.2 STATUS POST MITRAL VALVE REPLACEMENT: Primary | ICD-10-CM

## 2024-06-12 LAB
INR PPP: 3.4 (ref 0.91–1.09)
PROTHROMBIN TIME: 41.3 SECONDS (ref 10–13.8)

## 2024-06-12 PROCEDURE — G0463 HOSPITAL OUTPT CLINIC VISIT: HCPCS

## 2024-06-12 PROCEDURE — 85610 PROTHROMBIN TIME: CPT

## 2024-06-12 PROCEDURE — 36416 COLLJ CAPILLARY BLOOD SPEC: CPT

## 2024-06-12 NOTE — PROGRESS NOTES
Anticoagulation Clinic Progress Note    Anticoagulation Summary  As of 6/12/2024      INR goal:  2.5-3.5   TTR:  73.2% (5.7 y)   INR used for dosing:  3.4 (6/12/2024)   Warfarin maintenance plan:  5 mg every Tue, Thu, Sat; 7.5 mg all other days   Weekly warfarin total:  45 mg   No change documented:  Tulio De La Rosa, Pharmacy Intern   Plan last modified:  Nickie Landin [facility] (1/23/2024)   Next INR check:  7/10/2024   Priority:  Maintenance   Target end date:  Indefinite    Indications    Status post mitral valve replacement [Z95.2]  Warfarin anticoagulation [Z79.01]                 Anticoagulation Episode Summary       INR check location:      Preferred lab:      Send INR reminders to:   [facility]    Comments:            Anticoagulation Care Providers       Provider Role Specialty Phone number    Lindsey Vargas MD Referring Cardiology [phone]            Clinic Interview:  Patient Findings     Negatives:  Signs/symptoms of thrombosis, Signs/symptoms of bleeding,   Laboratory test error suspected, Change in health, Change in alcohol use,   Change in activity, Upcoming invasive procedure, Emergency department   visit, Upcoming dental procedure, Missed doses, Extra doses, Change in   medications, Change in diet/appetite, Hospital admission, Bruising, Other   complaints      Clinical Outcomes     Negatives:  Major bleeding event, Thromboembolic event,   Anticoagulation-related hospital admission, Anticoagulation-related ED   visit, Anticoagulation-related fatality        INR History:      2/6/2024    10:30 AM 2/20/2024    10:30 AM 3/19/2024     9:15 AM 4/2/2024     9:15 AM 4/17/2024     9:30 AM 5/15/2024     9:30 AM 6/12/2024     9:30 AM   Anticoagulation Monitoring   INR 2.7 2.5 4.0 3.4 3.3 3.5 3.4   INR Date 2/6/2024 2/20/2024 3/19/2024 4/2/2024 4/17/2024 5/15/2024 6/12/2024   INR Goal 2.5-3.5 2.5-3.5 2.5-3.5 2.5-3.5 2.5-3.5 2.5-3.5 2.5-3.5   Trend Same Same Same Same Same Same Same    Last Week Total 45 mg 45 mg 45 mg 45 mg 45 mg 45 mg 45 mg   Next Week Total 45 mg 45 mg 40 mg 45 mg 45 mg 45 mg 45 mg   Sun 7.5 mg 7.5 mg 7.5 mg 7.5 mg 7.5 mg 7.5 mg 7.5 mg   Mon 7.5 mg 7.5 mg 7.5 mg 7.5 mg 7.5 mg 7.5 mg 7.5 mg   Tue 5 mg 5 mg Hold (3/19); Otherwise 5 mg 5 mg 5 mg 5 mg 5 mg   Wed 7.5 mg 7.5 mg 7.5 mg 7.5 mg 7.5 mg 7.5 mg 7.5 mg   Thu 5 mg 5 mg 5 mg 5 mg 5 mg 5 mg 5 mg   Fri 7.5 mg 7.5 mg 7.5 mg 7.5 mg 7.5 mg 7.5 mg 7.5 mg   Sat 5 mg 5 mg 5 mg 5 mg 5 mg 5 mg 5 mg       Plan:  1. INR is Therapeutic today- see above in Anticoagulation Summary.  Will instruct Yana Lehman to Continue their warfarin regimen- see above in Anticoagulation Summary.  2. Follow up in 4 weeks  3. Patient declines warfarin refills.  4. Verbal and written information provided. Patient expresses understanding and has no further questions at this time.    Tulio De La Rosa, Pharmacy Intern

## 2024-06-12 NOTE — PROGRESS NOTES
I have supervised and reviewed the notes, assessments, and/or procedures performed. I personally performed the assessment and implemented the plan. I concur with the documentation of this patient encounter.    Nickie Landin, [facility]

## 2024-07-11 ENCOUNTER — ANTICOAGULATION VISIT (OUTPATIENT)
Dept: PHARMACY | Facility: HOSPITAL | Age: 69
End: 2024-07-11
Payer: MEDICARE

## 2024-07-11 DIAGNOSIS — Z95.2 STATUS POST MITRAL VALVE REPLACEMENT: Primary | ICD-10-CM

## 2024-07-11 DIAGNOSIS — Z79.01 WARFARIN ANTICOAGULATION: ICD-10-CM

## 2024-07-11 LAB
INR PPP: 2.8 (ref 0.91–1.09)
PROTHROMBIN TIME: 33.1 SECONDS (ref 10–13.8)

## 2024-07-11 PROCEDURE — 85610 PROTHROMBIN TIME: CPT

## 2024-07-11 PROCEDURE — G0463 HOSPITAL OUTPT CLINIC VISIT: HCPCS

## 2024-07-11 PROCEDURE — 36416 COLLJ CAPILLARY BLOOD SPEC: CPT

## 2024-07-11 NOTE — PROGRESS NOTES
Anticoagulation Clinic Progress Note    Anticoagulation Summary  As of 2024      INR goal:  2.5-3.5   TTR:  73.5% (5.8 y)   INR used for dosin.8 (2024)   Warfarin maintenance plan:  5 mg every Tue, Thu, Sat; 7.5 mg all other days   Weekly warfarin total:  45 mg   No change documented:  Yen Rutledge, Pharmacy Intern   Plan last modified:  Nickie Landin RPH (2024)   Next INR check:  2024   Priority:  Maintenance   Target end date:  Indefinite    Indications    Status post mitral valve replacement [Z95.2]  Warfarin anticoagulation [Z79.01]                 Anticoagulation Episode Summary       INR check location:      Preferred lab:      Send INR reminders to:   [facility]    Comments:            Anticoagulation Care Providers       Provider Role Specialty Phone number    Lindsey Vargas MD Referring Cardiology [phone]            Clinic Interview:  Patient Findings     Positives:  Change in diet/appetite, Other complaints    Negatives:  Signs/symptoms of thrombosis, Signs/symptoms of bleeding,   Laboratory test error suspected, Change in health, Change in alcohol use,   Change in activity, Upcoming invasive procedure, Emergency department   visit, Upcoming dental procedure, Missed doses, Extra doses, Change in   medications, Hospital admission, Bruising    Comments:  Patient reports additional life stressors. Increased intake of   asparagus recently. Patient inquired about green tea, and informed patient   that it can increase her INR. Patient does not plan to start drinking   green tea at this time.       Clinical Outcomes     Negatives:  Major bleeding event, Thromboembolic event,   Anticoagulation-related hospital admission, Anticoagulation-related ED   visit, Anticoagulation-related fatality    Comments:  Patient reports additional life stressors. Increased intake of   asparagus recently. Patient inquired about green tea, and informed patient   that it can  increase her INR. Patient does not plan to start drinking   green tea at this time.             INR History:      2/20/2024    10:30 AM 3/19/2024     9:15 AM 4/2/2024     9:15 AM 4/17/2024     9:30 AM 5/15/2024     9:30 AM 6/12/2024     9:30 AM 7/11/2024     9:15 AM   Anticoagulation Monitoring   INR 2.5 4.0 3.4 3.3 3.5 3.4 2.8   INR Date 2/20/2024 3/19/2024 4/2/2024 4/17/2024 5/15/2024 6/12/2024 7/11/2024   INR Goal 2.5-3.5 2.5-3.5 2.5-3.5 2.5-3.5 2.5-3.5 2.5-3.5 2.5-3.5   Trend Same Same Same Same Same Same Same   Last Week Total 45 mg 45 mg 45 mg 45 mg 45 mg 45 mg 45 mg   Next Week Total 45 mg 40 mg 45 mg 45 mg 45 mg 45 mg 45 mg   Sun 7.5 mg 7.5 mg 7.5 mg 7.5 mg 7.5 mg 7.5 mg 7.5 mg   Mon 7.5 mg 7.5 mg 7.5 mg 7.5 mg 7.5 mg 7.5 mg 7.5 mg   Tue 5 mg Hold (3/19); Otherwise 5 mg 5 mg 5 mg 5 mg 5 mg 5 mg   Wed 7.5 mg 7.5 mg 7.5 mg 7.5 mg 7.5 mg 7.5 mg 7.5 mg   Thu 5 mg 5 mg 5 mg 5 mg 5 mg 5 mg 5 mg   Fri 7.5 mg 7.5 mg 7.5 mg 7.5 mg 7.5 mg 7.5 mg 7.5 mg   Sat 5 mg 5 mg 5 mg 5 mg 5 mg 5 mg 5 mg       Plan:  1. INR is Therapeutic today- see above in Anticoagulation Summary.  Will instruct Yana Lehman to Continue their warfarin regimen (5 mg on Tu/Th/ Sat, and 7.5 mg on all other days) - see above in Anticoagulation Summary.  2. Follow up in 6 weeks  3. Patient declines warfarin refills.  4. Verbal and written information provided. Patient expresses understanding and has no further questions at this time.    Yen Rutledge, Pharmacy Intern

## 2024-08-22 ENCOUNTER — ANTICOAGULATION VISIT (OUTPATIENT)
Dept: PHARMACY | Facility: HOSPITAL | Age: 69
End: 2024-08-22
Payer: MEDICARE

## 2024-08-22 DIAGNOSIS — Z79.01 WARFARIN ANTICOAGULATION: ICD-10-CM

## 2024-08-22 DIAGNOSIS — Z95.2 STATUS POST MITRAL VALVE REPLACEMENT: Primary | ICD-10-CM

## 2024-08-22 LAB
INR PPP: 2.7 (ref 0.91–1.09)
PROTHROMBIN TIME: 33 SECONDS (ref 10–13.8)

## 2024-08-22 PROCEDURE — 85610 PROTHROMBIN TIME: CPT

## 2024-08-22 PROCEDURE — 36416 COLLJ CAPILLARY BLOOD SPEC: CPT

## 2024-08-22 PROCEDURE — G0463 HOSPITAL OUTPT CLINIC VISIT: HCPCS

## 2024-08-22 NOTE — PROGRESS NOTES
Anticoagulation Clinic Progress Note    Anticoagulation Summary  As of 2024      INR goal:  2.5-3.5   TTR:  74.0% (5.9 y)   INR used for dosin.7 (2024)   Warfarin maintenance plan:  5 mg every Tue, Thu, Sat; 7.5 mg all other days   Weekly warfarin total:  45 mg   No change documented:  Lizbeth Hogue, Pharmacy Intern   Plan last modified:  Nickie Landin RPH (2024)   Next INR check:  10/3/2024   Priority:  Maintenance   Target end date:  Indefinite    Indications    Status post mitral valve replacement [Z95.2]  Warfarin anticoagulation [Z79.01]                 Anticoagulation Episode Summary       INR check location:      Preferred lab:      Send INR reminders to:  [facility]    Comments:            Anticoagulation Care Providers       Provider Role Specialty Phone number    Lindsey Vargas MD Referring Cardiology [phone]            Clinic Interview:  Patient Findings     Positives:  Other complaints    Negatives:  Signs/symptoms of thrombosis, Signs/symptoms of bleeding,   Laboratory test error suspected, Change in health, Change in alcohol use,   Change in activity, Upcoming invasive procedure, Emergency department   visit, Upcoming dental procedure, Missed doses, Extra doses, Change in   medications, Change in diet/appetite, Hospital admission, Bruising    Comments:  Patient reports additional life stress with her brother      Clinical Outcomes     Negatives:  Major bleeding event, Thromboembolic event,   Anticoagulation-related hospital admission, Anticoagulation-related ED   visit, Anticoagulation-related fatality    Comments:  Patient reports additional life stress with her brother        INR History:      3/19/2024     9:15 AM 2024     9:15 AM 2024     9:30 AM 5/15/2024     9:30 AM 2024     9:30 AM 2024     9:15 AM 2024     9:15 AM   Anticoagulation Monitoring   INR 4.0 3.4 3.3 3.5 3.4 2.8 2.7   INR Date 3/19/2024 2024 2024  5/15/2024 6/12/2024 7/11/2024 8/22/2024   INR Goal 2.5-3.5 2.5-3.5 2.5-3.5 2.5-3.5 2.5-3.5 2.5-3.5 2.5-3.5   Trend Same Same Same Same Same Same Same   Last Week Total 45 mg 45 mg 45 mg 45 mg 45 mg 45 mg 45 mg   Next Week Total 40 mg 45 mg 45 mg 45 mg 45 mg 45 mg 45 mg   Sun 7.5 mg 7.5 mg 7.5 mg 7.5 mg 7.5 mg 7.5 mg 7.5 mg   Mon 7.5 mg 7.5 mg 7.5 mg 7.5 mg 7.5 mg 7.5 mg 7.5 mg   Tue Hold (3/19); Otherwise 5 mg 5 mg 5 mg 5 mg 5 mg 5 mg 5 mg   Wed 7.5 mg 7.5 mg 7.5 mg 7.5 mg 7.5 mg 7.5 mg 7.5 mg   Thu 5 mg 5 mg 5 mg 5 mg 5 mg 5 mg 5 mg   Fri 7.5 mg 7.5 mg 7.5 mg 7.5 mg 7.5 mg 7.5 mg 7.5 mg   Sat 5 mg 5 mg 5 mg 5 mg 5 mg 5 mg 5 mg       Plan:  1. INR is Therapeutic today- see above in Anticoagulation Summary.   Will instruct Yana Lehman to Continue their warfarin regimen- see above in Anticoagulation Summary.  2. Follow up in 6 weeks  3. They have been instructed to call if any changes in medications, doses, concerns, etc. Patient expresses understanding and has no further questions at this time.    Lizbeth Hogue, Pharmacy Intern

## 2024-09-23 RX ORDER — WARFARIN SODIUM 5 MG/1
TABLET ORAL
Qty: 120 TABLET | Refills: 1 | Status: SHIPPED | OUTPATIENT
Start: 2024-09-23

## 2024-10-11 ENCOUNTER — ANTICOAGULATION VISIT (OUTPATIENT)
Dept: PHARMACY | Facility: HOSPITAL | Age: 69
End: 2024-10-11
Payer: MEDICARE

## 2024-10-11 DIAGNOSIS — Z95.2 STATUS POST MITRAL VALVE REPLACEMENT: Primary | ICD-10-CM

## 2024-10-11 DIAGNOSIS — Z79.01 WARFARIN ANTICOAGULATION: ICD-10-CM

## 2024-10-11 LAB
INR PPP: 3.2 (ref 0.91–1.09)
PROTHROMBIN TIME: 38.4 SECONDS (ref 10–13.8)

## 2024-10-11 PROCEDURE — G0463 HOSPITAL OUTPT CLINIC VISIT: HCPCS

## 2024-10-11 PROCEDURE — 36416 COLLJ CAPILLARY BLOOD SPEC: CPT

## 2024-10-11 PROCEDURE — 85610 PROTHROMBIN TIME: CPT

## 2024-10-11 NOTE — PROGRESS NOTES
Anticoagulation Clinic Progress Note    Anticoagulation Summary  As of 10/11/2024      INR goal:  2.5-3.5   TTR:  74.6% (6 y)   INR used for dosing:  3.2 (10/11/2024)   Warfarin maintenance plan:  5 mg every Tue, Thu, Sat; 7.5 mg all other days   Weekly warfarin total:  45 mg   No change documented:  Grady Salazar, PharmD   Plan last modified:  Nickie Landin RPH (1/23/2024)   Next INR check:  11/22/2024   Priority:  Maintenance   Target end date:  Indefinite    Indications    Status post mitral valve replacement [Z95.2]  Warfarin anticoagulation [Z79.01]                 Anticoagulation Episode Summary       INR check location:      Preferred lab:      Send INR reminders to:   [facility]    Comments:            Anticoagulation Care Providers       Provider Role Specialty Phone number    Lindsey Vargas MD Referring Cardiology [phone]            Clinic Interview:  Patient Findings     Positives:  Change in health, Change in diet/appetite    Negatives:  Signs/symptoms of thrombosis, Signs/symptoms of bleeding,   Laboratory test error suspected, Change in alcohol use, Change in   activity, Upcoming invasive procedure, Emergency department visit,   Upcoming dental procedure, Missed doses, Extra doses, Change in   medications, Hospital admission, Bruising, Other complaints    Comments:  Reports she had an illness last week that resulted in body   aches, fever, and low appetite for ~4 days. Reports she is feeling better,   and her appetite is beginning to improve.       Clinical Outcomes     Negatives:  Major bleeding event, Thromboembolic event,   Anticoagulation-related hospital admission, Anticoagulation-related ED   visit, Anticoagulation-related fatality    Comments:  Reports she had an illness last week that resulted in body   aches, fever, and low appetite for ~4 days. Reports she is feeling better,   and her appetite is beginning to improve.         INR History:      4/2/2024     9:15  AM 4/17/2024     9:30 AM 5/15/2024     9:30 AM 6/12/2024     9:30 AM 7/11/2024     9:15 AM 8/22/2024     9:15 AM 10/11/2024     8:30 AM   Anticoagulation Monitoring   INR 3.4 3.3 3.5 3.4 2.8 2.7 3.2   INR Date 4/2/2024 4/17/2024 5/15/2024 6/12/2024 7/11/2024 8/22/2024 10/11/2024   INR Goal 2.5-3.5 2.5-3.5 2.5-3.5 2.5-3.5 2.5-3.5 2.5-3.5 2.5-3.5   Trend Same Same Same Same Same Same Same   Last Week Total 45 mg 45 mg 45 mg 45 mg 45 mg 45 mg 45 mg   Next Week Total 45 mg 45 mg 45 mg 45 mg 45 mg 45 mg 45 mg   Sun 7.5 mg 7.5 mg 7.5 mg 7.5 mg 7.5 mg 7.5 mg 7.5 mg   Mon 7.5 mg 7.5 mg 7.5 mg 7.5 mg 7.5 mg 7.5 mg 7.5 mg   Tue 5 mg 5 mg 5 mg 5 mg 5 mg 5 mg 5 mg   Wed 7.5 mg 7.5 mg 7.5 mg 7.5 mg 7.5 mg 7.5 mg 7.5 mg   Thu 5 mg 5 mg 5 mg 5 mg 5 mg 5 mg 5 mg   Fri 7.5 mg 7.5 mg 7.5 mg 7.5 mg 7.5 mg 7.5 mg 7.5 mg   Sat 5 mg 5 mg 5 mg 5 mg 5 mg 5 mg 5 mg       Plan:  1. INR is Therapeutic today- see above in Anticoagulation Summary.  Will instruct Yana Lehman to Continue their warfarin regimen - see above in Anticoagulation Summary.  2. Follow up in 6 weeks.  3. Patient declines warfarin refills.  4. Verbal and written information provided. Patient expresses understanding and has no further questions at this time.    Grady Salazar, PharmD

## 2024-11-26 ENCOUNTER — ANTICOAGULATION VISIT (OUTPATIENT)
Dept: PHARMACY | Facility: HOSPITAL | Age: 69
End: 2024-11-26
Payer: MEDICARE

## 2024-11-26 DIAGNOSIS — Z95.2 STATUS POST MITRAL VALVE REPLACEMENT: Primary | ICD-10-CM

## 2024-11-26 DIAGNOSIS — Z79.01 WARFARIN ANTICOAGULATION: ICD-10-CM

## 2024-11-26 LAB
INR PPP: 3.5 (ref 0.91–1.09)
PROTHROMBIN TIME: 41.6 SECONDS (ref 10–13.8)

## 2024-11-26 PROCEDURE — G0463 HOSPITAL OUTPT CLINIC VISIT: HCPCS

## 2024-11-26 PROCEDURE — 36416 COLLJ CAPILLARY BLOOD SPEC: CPT

## 2024-11-26 PROCEDURE — 85610 PROTHROMBIN TIME: CPT

## 2024-11-26 NOTE — PROGRESS NOTES
Anticoagulation Clinic Progress Note    Anticoagulation Summary  As of 11/26/2024      INR goal:  2.5-3.5   TTR:  75.2% (6.2 y)   INR used for dosing:  3.5 (11/26/2024)   Warfarin maintenance plan:  5 mg every Tue, Thu, Sat; 7.5 mg all other days   Weekly warfarin total:  45 mg   No change documented:  Grady Salazar, PharmD   Plan last modified:  Nickie Landin RPH (1/23/2024)   Next INR check:  1/7/2025   Priority:  Maintenance   Target end date:  Indefinite    Indications    Status post mitral valve replacement [Z95.2]  Warfarin anticoagulation [Z79.01]                 Anticoagulation Episode Summary       INR check location:  --    Preferred lab:  --    Send INR reminders to:   [facility]    Comments:  --          Anticoagulation Care Providers       Provider Role Specialty Phone number    Lindsey Vargas MD Referring Cardiology [phone]            Clinic Interview:  Patient Findings     Negatives:  Signs/symptoms of thrombosis, Signs/symptoms of bleeding,   Laboratory test error suspected, Change in health, Change in alcohol use,   Change in activity, Upcoming invasive procedure, Emergency department   visit, Upcoming dental procedure, Missed doses, Extra doses, Change in   medications, Change in diet/appetite, Hospital admission, Bruising, Other   complaints      Clinical Outcomes     Negatives:  Major bleeding event, Thromboembolic event,   Anticoagulation-related hospital admission, Anticoagulation-related ED   visit, Anticoagulation-related fatality        INR History:      4/17/2024     9:30 AM 5/15/2024     9:30 AM 6/12/2024     9:30 AM 7/11/2024     9:15 AM 8/22/2024     9:15 AM 10/11/2024     8:30 AM 11/26/2024     9:00 AM   Anticoagulation Monitoring   INR 3.3 3.5 3.4 2.8 2.7 3.2 3.5   INR Date 4/17/2024 5/15/2024 6/12/2024 7/11/2024 8/22/2024 10/11/2024 11/26/2024   INR Goal 2.5-3.5 2.5-3.5 2.5-3.5 2.5-3.5 2.5-3.5 2.5-3.5 2.5-3.5   Trend Same Same Same Same Same Same Same    Last Week Total 45 mg 45 mg 45 mg 45 mg 45 mg 45 mg 45 mg   Next Week Total 45 mg 45 mg 45 mg 45 mg 45 mg 45 mg 45 mg   Sun 7.5 mg 7.5 mg 7.5 mg 7.5 mg 7.5 mg 7.5 mg 7.5 mg   Mon 7.5 mg 7.5 mg 7.5 mg 7.5 mg 7.5 mg 7.5 mg 7.5 mg   Tue 5 mg 5 mg 5 mg 5 mg 5 mg 5 mg 5 mg   Wed 7.5 mg 7.5 mg 7.5 mg 7.5 mg 7.5 mg 7.5 mg 7.5 mg   Thu 5 mg 5 mg 5 mg 5 mg 5 mg 5 mg 5 mg   Fri 7.5 mg 7.5 mg 7.5 mg 7.5 mg 7.5 mg 7.5 mg 7.5 mg   Sat 5 mg 5 mg 5 mg 5 mg 5 mg 5 mg 5 mg       Plan:  1. INR is Therapeutic today- see above in Anticoagulation Summary.  Will instruct Yana Lehman to Continue their warfarin regimen- see above in Anticoagulation Summary.  2. Follow up in 6 weeks  3. Patient declines warfarin refills.  4. Verbal and written information provided. Patient expresses understanding and has no further questions at this time.    Grady Salazar, PharmD

## 2024-12-19 ENCOUNTER — OFFICE VISIT (OUTPATIENT)
Dept: CARDIOLOGY | Facility: CLINIC | Age: 69
End: 2024-12-19
Payer: MEDICARE

## 2024-12-19 VITALS
HEIGHT: 62 IN | DIASTOLIC BLOOD PRESSURE: 79 MMHG | HEART RATE: 77 BPM | OXYGEN SATURATION: 97 % | WEIGHT: 108 LBS | BODY MASS INDEX: 19.88 KG/M2 | SYSTOLIC BLOOD PRESSURE: 125 MMHG

## 2024-12-19 DIAGNOSIS — I10 PRIMARY HYPERTENSION: ICD-10-CM

## 2024-12-19 DIAGNOSIS — I27.20 PULMONARY HYPERTENSION: ICD-10-CM

## 2024-12-19 DIAGNOSIS — I48.21 PERMANENT ATRIAL FIBRILLATION: Primary | ICD-10-CM

## 2024-12-19 DIAGNOSIS — I34.0 NONRHEUMATIC MITRAL VALVE REGURGITATION: ICD-10-CM

## 2024-12-19 DIAGNOSIS — E78.49 OTHER HYPERLIPIDEMIA: ICD-10-CM

## 2024-12-19 PROCEDURE — 3078F DIAST BP <80 MM HG: CPT | Performed by: FAMILY MEDICINE

## 2024-12-19 PROCEDURE — 3074F SYST BP LT 130 MM HG: CPT | Performed by: FAMILY MEDICINE

## 2024-12-19 PROCEDURE — 93000 ELECTROCARDIOGRAM COMPLETE: CPT | Performed by: FAMILY MEDICINE

## 2024-12-19 PROCEDURE — 99214 OFFICE O/P EST MOD 30 MIN: CPT | Performed by: FAMILY MEDICINE

## 2024-12-19 RX ORDER — METOPROLOL SUCCINATE 25 MG/1
25 TABLET, EXTENDED RELEASE ORAL 2 TIMES DAILY
Qty: 180 TABLET | Refills: 3 | Status: SHIPPED | OUTPATIENT
Start: 2024-12-19

## 2024-12-19 RX ORDER — LOSARTAN POTASSIUM 100 MG/1
100 TABLET ORAL NIGHTLY
Qty: 90 TABLET | Refills: 3 | Status: SHIPPED | OUTPATIENT
Start: 2024-12-19

## 2024-12-19 RX ORDER — DIGOXIN 125 MCG
125 TABLET ORAL DAILY
Qty: 90 TABLET | Refills: 3 | Status: SHIPPED | OUTPATIENT
Start: 2024-12-19

## 2024-12-19 RX ORDER — WARFARIN SODIUM 5 MG/1
TABLET ORAL
Qty: 120 TABLET | Refills: 1 | Status: SHIPPED | OUTPATIENT
Start: 2024-12-19

## 2024-12-19 RX ORDER — SIMVASTATIN 10 MG
10 TABLET ORAL DAILY
Qty: 90 TABLET | Refills: 3 | Status: SHIPPED | OUTPATIENT
Start: 2024-12-19 | End: 2027-09-23

## 2024-12-19 NOTE — PROGRESS NOTES
Date of Office Visit: 2024  Encounter Provider: MONICA Lucio  Place of Service: [facility] CARDIOLOGY  Established cardiologist: Lindsey Vargas MD  Patient Name: Yana Lehman  :1955      Patient ID:  Yana Lehman is a 69 y.o. female is here for  followup    With a pertinent medical history of dilated cardiomyopathy, atrial fibrillation, mitral stenosis, pulmonary hypertension, depression, hypertension    History of Present Illness   in the emergency department with A-fib RVR was hypotensive.  Stat echo showed EF 50 to 55%, severe left atrial dilation, severe RV dilation, severe reduction of RV function, severe right atrial dilation, trace to mild aortic insufficiency, severely thickened mitral leaflets with grade 3 mobility of the leaflets and severe mitral stenosis.  Mean gradient across the valve was 14 mm per mercury and RV systolic pressure was 94 mm per mercury.  There was moderate tricuspid insufficiency.  An emergent KITTY showed severe left atrial dilation, severe dilated RV with severe reduction of RV systolic function, severe mitral stenosis, mild to moderate mitral insufficiency, moderate tricuspid insufficiency.  Cardiac catheterization done 6/10/2015 showed normal coronary arteries, moderate to severe pulmonary hypertension, mildly depressed cardiac output, and severely elevated peripheral vascular resistance.  Her PA pressure was a mean 55 mmHg.  RV pressure 63/3.  Right atrial pressure was 25.  She was taken immediately to the operating room with Dr. Márquez where she had an emergency mitral valve replacement with a 31 mm Medtronic mechanical valve.  Preservation of the papillary muscle with 2 Vanderpool-Librado jacqueilne chord.    2018 presented to [facility] noted to be noncompliant for several years due to depression and stopped many of her medicines but did remain on warfarin.  She was having fatigue and palpitations.  Echo showed an EF of  32% with estimated EF 21 to 25%, LV cavity with mild to moderately dilated wall motion abnormalities left atrium and right ventricle cavity moderately dilated, mechanical mitral valve prosthesis present and grossly normal, moderate TR, mild pulmonary hypertension.  She is recommended to start carvedilol, digoxin, furosemide, potassium, and continue warfarin.  Stress nuclear study done 9/5/2018 with no evidence of ischemia and normal EF.     Echo done 12/14/2022 showed ejection fraction 59% with severe left atrial enlargement, mild to moderate right atrial enlargement, mechanical mitral valve normal, moderately reduced right ventricular systolic function.     Yana presents for follow-up.  She has been feeling well.  Unfortunately she has had a lot of family members who are ill.  A brother who was hospitalized recently, a grand niece who has breast cancer, her nephew's wife who was diagnosed with pancreatic and colon cancer.  Yana is not exercising right now.  Her blood pressure was initially elevated today though she does report whitecoat hypertension.  She checks it at home and has been very well-controlled with average systolic around 113.  She is in atrial fibrillation and has no symptoms from this.  She is very diligent with her warfarin.  There is no chest pain or pressure, soa, sage, pnd, lightheadedness, dizziness, presyncope/syncope, leg swelling, heart racing, or palpitations.         Current Outpatient Medications on File Prior to Visit   Medication Sig Dispense Refill    albuterol sulfate  (90 Base) MCG/ACT inhaler Inhale 2 puffs.      amoxicillin (AMOXIL) 500 MG capsule Take 4 capsules by mouth See Admin Instructions. 4 capsules 1 hour prior to procedure 12 capsule 0    ascorbic acid (VITAMIN C) 100 MG tablet Take  by mouth.      Biotin 82642 MCG tablet Take 1 tablet by mouth Daily.      Cholecalciferol (VITAMIN D-3) 125 MCG (5000 UT) tablet Take 1 tablet by mouth Daily.      VITAMIN E PO Take  by  "mouth.      Zinc Sulfate 66 MG tablet Take  by mouth.      [DISCONTINUED] digoxin (LANOXIN) 125 MCG tablet TAKE ONE TABLET BY MOUTH DAILY 90 tablet 3    [DISCONTINUED] losartan (COZAAR) 100 MG tablet TAKE ONE TABLET BY MOUTH ONCE NIGHTLY 90 tablet 3    [DISCONTINUED] metoprolol succinate XL (TOPROL-XL) 25 MG 24 hr tablet TAKE ONE TABLET BY MOUTH TWICE A  tablet 3    [DISCONTINUED] warfarin (COUMADIN) 5 MG tablet TAKE 1 TABLET BY MOUTH ON TUESDAY, THURSDAY, AND SATURDAY AND TAKE 1.5 TABLETS ALL OTHER DAYS OR AS DIRECTED. 120 tablet 1    [DISCONTINUED] simvastatin (ZOCOR) 10 MG tablet Take 1 tablet by mouth Daily.       No current facility-administered medications on file prior to visit.         Procedures    ECG 12 Lead    Date/Time: 12/19/2024 10:27 AM  Performed by: Lizbeth Gordon APRN    Authorized by: Lizbeth Gordon APRN  Comparison: compared with previous ECG from 12/14/2023  Similar to previous ECG  Rhythm: atrial fibrillation  BPM: 77              Objective:      Vitals:    12/19/24 0940 12/19/24 1024   BP: 157/70 125/79   BP Location:  Left arm   Pulse: 77    SpO2: 97%    Weight: 49 kg (108 lb)    Height: 157.5 cm (62\")      Body mass index is 19.75 kg/m².  Wt Readings from Last 3 Encounters:   12/19/24 49 kg (108 lb)   12/14/23 49 kg (108 lb)   08/09/23 48.1 kg (106 lb)         Constitutional:       Appearance: Not in distress. Frail.   Eyes:      Pupils: Pupils are equal, round, and reactive to light.   Pulmonary:      Effort: Pulmonary effort is normal.      Breath sounds: Normal breath sounds.   Cardiovascular:      Normal rate. Irregular rhythm.      Murmurs: There is a grade 1/6 systolic murmur.   Pulses:     Radial: 2+ bilaterally.     Dorsalis pedis: 2+ bilaterally.     Posterior tibial: 2+ bilaterally.  Edema:     Peripheral edema absent.   Abdominal:      General: Bowel sounds are normal.      Palpations: Abdomen is soft.   Musculoskeletal: Normal range of motion.      Cervical " back: Normal range of motion. Skin:     General: Skin is warm and dry.   Neurological:      Mental Status: Alert and oriented to person, place and time.         Lab Review:        TSH 10/14/2024 3.07      Lipid panel 2/20/2024 total cholesterol 148, , HDL 52, LDL 76    Lab Results   Component Value Date    CHOL 134 12/14/2022    CHLPL 189 09/04/2019     Lab Results   Component Value Date    TRIG 73 12/14/2022    TRIG 124 09/04/2019     Lab Results   Component Value Date    HDL 58 12/14/2022    HDL 55 09/04/2019     Lab Results   Component Value Date    LDL 61 12/14/2022     (H) 09/04/2019       Lab Results   Component Value Date    WBC 8.19 02/20/2024    HGB 13.6 02/20/2024    HCT 43.3 02/20/2024    MCV 95.4 02/20/2024     02/20/2024       Lab Results   Component Value Date    GLUCOSE 124 (H) 11/02/2023    BUN 11 11/02/2023    CREATININE 0.85 11/02/2023    EGFR 74.3 11/02/2023    BCR 12.9 11/02/2023    K 4.2 11/02/2023    CO2 25.4 11/02/2023    CALCIUM 9.8 11/02/2023    ALBUMIN 4.6 11/02/2023    BILITOT 0.7 11/02/2023    AST 31 11/02/2023    ALT 17 11/02/2023       Lab Results   Component Value Date    HGBA1C 5.5 02/20/2024     Assessment:     1. Permanent atrial fibrillation    2. Nonrheumatic mitral valve regurgitation    3. Pulmonary hypertension    4. Primary hypertension    5. Other hyperlipidemia      H/o LV Cardiomyopathy, resolved. No volume overload.   Atrial fibrillation - on warfarin, heart rate better controlled on metoprolol and digoxin.   S/p MV replacement, 31 mm Medtronic mechanical, for mitral stenosis.  Functioning well.   Pulmonary HTN, improved  Depression, increased stressors with multiple family members ill.   Hypertension, goal <120/80.   Blood pressure well-controlled, maintain losartan and metoprolol.  Hematuria in past, followed with urology   Positive Cologuard normal colonoscopy done 6/2023.    Plan:   No medication changes were made  Return in about 1 year (around  12/19/2025) for Dr. Vargas.      Thank you for allowing me to participate in this patient's care. Please call with any questions or concerns.          Dragon dictation device was utilized in this note.

## 2025-01-10 ENCOUNTER — TELEPHONE (OUTPATIENT)
Dept: CARDIOLOGY | Facility: CLINIC | Age: 70
End: 2025-01-10
Payer: MEDICARE

## 2025-01-10 ENCOUNTER — ANTICOAGULATION VISIT (OUTPATIENT)
Dept: PHARMACY | Facility: HOSPITAL | Age: 70
End: 2025-01-10
Payer: MEDICARE

## 2025-01-10 ENCOUNTER — LAB (OUTPATIENT)
Dept: LAB | Facility: HOSPITAL | Age: 70
End: 2025-01-10
Payer: MEDICARE

## 2025-01-10 DIAGNOSIS — Z79.01 WARFARIN ANTICOAGULATION: ICD-10-CM

## 2025-01-10 DIAGNOSIS — Z95.2 STATUS POST MITRAL VALVE REPLACEMENT: Primary | ICD-10-CM

## 2025-01-10 LAB
INR PPP: 8.04 (ref 0.9–1.1)
PROTHROMBIN TIME: 67.4 SECONDS (ref 11.7–14.2)

## 2025-01-10 PROCEDURE — 36415 COLL VENOUS BLD VENIPUNCTURE: CPT

## 2025-01-10 PROCEDURE — 85610 PROTHROMBIN TIME: CPT

## 2025-01-10 PROCEDURE — G0463 HOSPITAL OUTPT CLINIC VISIT: HCPCS

## 2025-01-10 NOTE — TELEPHONE ENCOUNTER
Called and left VM, will continue to try to reach pt.    HUB- please put patient straight through to triage    Dr. Vargas- pt is followed by the anticoagulation clinic, here is the note from today about high INR:        Lolita Lemons, RN  Triage RN  01/10/25 13:17 EST

## 2025-01-10 NOTE — PROGRESS NOTES
Anticoagulation Clinic Progress Note    Anticoagulation Summary  As of 1/10/2025      INR goal:  2.5-3.5   TTR:  73.7% (6.3 y)   INR used for dosin.04 (1/10/2025)   Warfarin maintenance plan:  5 mg every Tue, Thu, Sat; 7.5 mg all other days   Weekly warfarin total:  45 mg   Plan last modified:  Nickie Landin RPH (2024)   Next INR check:  2025   Priority:  Maintenance   Target end date:  Indefinite    Indications    Status post mitral valve replacement [Z95.2]  Warfarin anticoagulation [Z79.01]                 Anticoagulation Episode Summary       INR check location:  --    Preferred lab:  --    Send INR reminders to:   JO[facility]    Comments:  --          Anticoagulation Care Providers       Provider Role Specialty Phone number    Lindsey Vargas MD Referring Cardiology [phone]            Clinic Interview:  Patient Findings     Positives:  Change in health, Change in medications    Negatives:  Signs/symptoms of thrombosis, Signs/symptoms of bleeding,   Laboratory test error suspected, Change in alcohol use, Change in   activity, Upcoming invasive procedure, Emergency department visit,   Upcoming dental procedure, Missed doses, Extra doses, Change in   diet/appetite, Hospital admission, Bruising, Other complaints    Comments:  Patient tested positive for COVID-19 on 25, and has   continued to be have respiratory and sinus illness since. She was   prescribed a medrol dose pack and Stahist AD on 25, after an ED visit.   She developed a rash after taking the medrol dose pack on her neck/face   and was instructed to discontinue medication.       Clinical Outcomes     Negatives:  Major bleeding event, Thromboembolic event,   Anticoagulation-related hospital admission, Anticoagulation-related ED   visit, Anticoagulation-related fatality    Comments:  Patient tested positive for COVID-19 on 25, and has   continued to be have respiratory and sinus illness since. She  was   prescribed a medrol dose pack and Stahist AD on 1/8/25, after an ED visit.   She developed a rash after taking the medrol dose pack on her neck/face   and was instructed to discontinue medication.         INR History:      Latest Ref Rng & Units 6/12/2024     9:30 AM 7/11/2024     9:15 AM 8/22/2024     9:15 AM 10/11/2024     8:30 AM 11/26/2024     9:00 AM 1/10/2025    10:07 AM 1/10/2025    10:30 AM   Anticoagulation Monitoring   INR  3.4 2.8 2.7 3.2 3.5  8.04   INR Date  6/12/2024 7/11/2024 8/22/2024 10/11/2024 11/26/2024  1/10/2025   INR Goal  2.5-3.5 2.5-3.5 2.5-3.5 2.5-3.5 2.5-3.5  2.5-3.5   Trend  Same Same Same Same Same  Same   Last Week Total  45 mg 45 mg 45 mg 45 mg 45 mg  45 mg   Next Week Total  45 mg 45 mg 45 mg 45 mg 45 mg  27.5 mg   Sun  7.5 mg 7.5 mg 7.5 mg 7.5 mg 7.5 mg  2.5 mg (1/12)   Mon  7.5 mg 7.5 mg 7.5 mg 7.5 mg 7.5 mg  -   Tue  5 mg 5 mg 5 mg 5 mg 5 mg  -   Wed  7.5 mg 7.5 mg 7.5 mg 7.5 mg 7.5 mg  -   Thu  5 mg 5 mg 5 mg 5 mg 5 mg  -   Fri  7.5 mg 7.5 mg 7.5 mg 7.5 mg 7.5 mg  Hold (1/10)   Sat  5 mg 5 mg 5 mg 5 mg 5 mg  Hold (1/11)   Historical INR 0.90 - 1.10      8.04         Plan:  1. INR is Supratherapeutic today- see above in Anticoagulation Summary. Patient sent to lab for POC INR >8--> lab resulted at 8.04.   Will instruct Yana Lehman to hold warfarin today (1/10/25) and tomorrow (1/11/25), and partial dose Sunday (1/12/25) to 2.5 mg.   2. Follow up in 3 days  3. Patient declines warfarin refills.  4. Verbal and written information provided. Patient expresses understanding and has no further questions at this time.    Kay Mcclure [facility]

## 2025-01-10 NOTE — TELEPHONE ENCOUNTER
Please make sure that the patient has been called about a very high INR.  Find out who normally follows the INR.

## 2025-01-10 NOTE — TELEPHONE ENCOUNTER
Pt called back in to triage.  She was able to verbalized the instructions she was given from the anticoagulation clinic (see below).     Lolita Lemons, MELI  Triage RN  01/10/25 13:34 EST

## 2025-01-13 ENCOUNTER — ANTICOAGULATION VISIT (OUTPATIENT)
Dept: PHARMACY | Facility: HOSPITAL | Age: 70
End: 2025-01-13
Payer: MEDICARE

## 2025-01-13 DIAGNOSIS — Z95.2 STATUS POST MITRAL VALVE REPLACEMENT: Primary | ICD-10-CM

## 2025-01-13 DIAGNOSIS — Z79.01 WARFARIN ANTICOAGULATION: ICD-10-CM

## 2025-01-13 LAB
INR PPP: 1.3 (ref 0.91–1.09)
PROTHROMBIN TIME: 16.1 SECONDS (ref 10–13.8)

## 2025-01-13 PROCEDURE — G0463 HOSPITAL OUTPT CLINIC VISIT: HCPCS

## 2025-01-13 PROCEDURE — 85610 PROTHROMBIN TIME: CPT

## 2025-01-13 PROCEDURE — 36416 COLLJ CAPILLARY BLOOD SPEC: CPT

## 2025-01-13 NOTE — PROGRESS NOTES
Anticoagulation Clinic Progress Note    Anticoagulation Summary  As of 2025      INR goal:  2.5-3.5   TTR:  73.6% (6.3 y)   INR used for dosin.3 (2025)   Warfarin maintenance plan:  5 mg every Tue, Thu, Sat; 7.5 mg all other days   Weekly warfarin total:  45 mg   Plan last modified:  Nickie Landin RPH (2024)   Next INR check:  2025   Priority:  Maintenance   Target end date:  Indefinite    Indications    Status post mitral valve replacement [Z95.2]  Warfarin anticoagulation [Z79.01]                 Anticoagulation Episode Summary       INR check location:  --    Preferred lab:  --    Send INR reminders to:  [facility] [place]    Comments:  --          Anticoagulation Care Providers       Provider Role Specialty Phone number    Lindsey Vargas MD Referring Cardiology [phone]            Clinic Interview:  Patient Findings     Positives:  Change in health, Missed doses    Negatives:  Signs/symptoms of thrombosis, Signs/symptoms of bleeding,   Laboratory test error suspected, Change in alcohol use, Change in   activity, Upcoming invasive procedure, Emergency department visit,   Upcoming dental procedure, Extra doses, Change in medications, Change in   diet/appetite, Hospital admission, Bruising, Other complaints    Comments:  Recent illness, improving but still feels sick. Held warfarin   doses x 2 days for supratherapeutic INR at last visit      Clinical Outcomes     Negatives:  Major bleeding event, Thromboembolic event,   Anticoagulation-related hospital admission, Anticoagulation-related ED   visit, Anticoagulation-related fatality    Comments:  Recent illness, improving but still feels sick. Held warfarin   doses x 2 days for supratherapeutic INR at last visit        INR History:      Latest Ref Rng & Units 2024     9:15 AM 2024     9:15 AM 10/11/2024     8:30 AM 2024     9:00 AM 1/10/2025    10:07 AM 1/10/2025    10:30 AM 2025    10:15 AM    Anticoagulation Monitoring   INR  2.8 2.7 3.2 3.5  8.04 1.3   INR Date  7/11/2024 8/22/2024 10/11/2024 11/26/2024  1/10/2025 1/13/2025   INR Goal  2.5-3.5 2.5-3.5 2.5-3.5 2.5-3.5  2.5-3.5 2.5-3.5   Trend  Same Same Same Same  Same Same   Last Week Total  45 mg 45 mg 45 mg 45 mg  45 mg 27.5 mg   Next Week Total  45 mg 45 mg 45 mg 45 mg  27.5 mg 47.5 mg   Sun  7.5 mg 7.5 mg 7.5 mg 7.5 mg  2.5 mg (1/12) -   Mon  7.5 mg 7.5 mg 7.5 mg 7.5 mg  - 10 mg (1/13)   Tue  5 mg 5 mg 5 mg 5 mg  - 5 mg   Wed  7.5 mg 7.5 mg 7.5 mg 7.5 mg  - 7.5 mg   Thu  5 mg 5 mg 5 mg 5 mg  - -   Fri  7.5 mg 7.5 mg 7.5 mg 7.5 mg  Hold (1/10) -   Sat  5 mg 5 mg 5 mg 5 mg  Hold (1/11) -   Historical INR 0.90 - 1.10     8.04          Plan:  1. INR is Subtherapeutic today- see above in Anticoagulation Summary.  Will instruct Yana Lehman to boost with 10 mg today then continue their warfarin regimen- see above in Anticoagulation Summary.  2. Follow up in 3 days  3. Patient declines warfarin refills.  4. Verbal and written information provided. Patient expresses understanding and has no further questions at this time.    Rebekah Pa, PharmD

## 2025-01-16 ENCOUNTER — ANTICOAGULATION VISIT (OUTPATIENT)
Dept: PHARMACY | Facility: HOSPITAL | Age: 70
End: 2025-01-16
Payer: MEDICARE

## 2025-01-16 DIAGNOSIS — Z95.2 STATUS POST MITRAL VALVE REPLACEMENT: Primary | ICD-10-CM

## 2025-01-16 DIAGNOSIS — Z79.01 WARFARIN ANTICOAGULATION: ICD-10-CM

## 2025-01-16 LAB
INR PPP: 2.4 (ref 0.91–1.09)
PROTHROMBIN TIME: 28.7 SECONDS (ref 10–13.8)

## 2025-01-16 PROCEDURE — G0463 HOSPITAL OUTPT CLINIC VISIT: HCPCS

## 2025-01-16 PROCEDURE — 36416 COLLJ CAPILLARY BLOOD SPEC: CPT

## 2025-01-16 PROCEDURE — 85610 PROTHROMBIN TIME: CPT

## 2025-01-16 NOTE — PROGRESS NOTES
Anticoagulation Clinic Progress Note    Anticoagulation Summary  As of 2025      INR goal:  2.5-3.5   TTR:  73.5% (6.3 y)   INR used for dosin.4 (2025)   Warfarin maintenance plan:  5 mg every Tue, Thu, Sat; 7.5 mg all other days   Weekly warfarin total:  45 mg   No change documented:  Rebekah Pa, PharmD   Plan last modified:  Nickie Landin RPH (2024)   Next INR check:  2025   Priority:  Maintenance   Target end date:  Indefinite    Indications    Status post mitral valve replacement [Z95.2]  Warfarin anticoagulation [Z79.01]                 Anticoagulation Episode Summary       INR check location:  --    Preferred lab:  --    Send INR reminders to:   [facility]    Comments:  --          Anticoagulation Care Providers       Provider Role Specialty Phone number    Lindsey Vargas MD Referring Cardiology [phone]            Clinic Interview:  Patient Findings     Negatives:  Signs/symptoms of thrombosis, Signs/symptoms of bleeding,   Laboratory test error suspected, Change in health, Change in alcohol use,   Change in activity, Upcoming invasive procedure, Emergency department   visit, Upcoming dental procedure, Missed doses, Extra doses, Change in   medications, Change in diet/appetite, Hospital admission, Bruising, Other   complaints      Clinical Outcomes     Negatives:  Major bleeding event, Thromboembolic event,   Anticoagulation-related hospital admission, Anticoagulation-related ED   visit, Anticoagulation-related fatality        INR History:      Latest Ref Rng & Units 2024     9:15 AM 10/11/2024     8:30 AM 2024     9:00 AM 1/10/2025    10:07 AM 1/10/2025    10:30 AM 2025    10:15 AM 2025    10:30 AM   Anticoagulation Monitoring   INR  2.7 3.2 3.5  8.04 1.3 2.4   INR Date  2024 10/11/2024 2024  1/10/2025 2025 2025   INR Goal  2.5-3.5 2.5-3.5 2.5-3.5  2.5-3.5 2.5-3.5 2.5-3.5   Trend  Same Same Same  Same  Same Same   Last Week Total  45 mg 45 mg 45 mg  45 mg 27.5 mg 30 mg   Next Week Total  45 mg 45 mg 45 mg  27.5 mg 47.5 mg 45 mg   Sun  7.5 mg 7.5 mg 7.5 mg  2.5 mg (1/12) - 7.5 mg   Mon  7.5 mg 7.5 mg 7.5 mg  - 10 mg (1/13) 7.5 mg   Tue  5 mg 5 mg 5 mg  - 5 mg 5 mg   Wed  7.5 mg 7.5 mg 7.5 mg  - 7.5 mg 7.5 mg   Thu  5 mg 5 mg 5 mg  - - 5 mg   Fri  7.5 mg 7.5 mg 7.5 mg  Hold (1/10) - 7.5 mg   Sat  5 mg 5 mg 5 mg  Hold (1/11) - 5 mg   Historical INR 0.90 - 1.10    8.04           Plan:  1. INR is Subtherapeutic today, expect INR will continue to rise over next day or 2 given the 10 mg dose on Monday- see above in Anticoagulation Summary.  Will instruct Yana Lehman to Continue their warfarin regimen- see above in Anticoagulation Summary.  2. Follow up in 2 weeks  3. Patient declines warfarin refills.  4. Verbal and written information provided. Patient expresses understanding and has no further questions at this time.    Rebekah Pa, PharmD

## 2025-01-30 ENCOUNTER — ANTICOAGULATION VISIT (OUTPATIENT)
Dept: PHARMACY | Facility: HOSPITAL | Age: 70
End: 2025-01-30
Payer: MEDICARE

## 2025-01-30 DIAGNOSIS — Z95.2 STATUS POST MITRAL VALVE REPLACEMENT: Primary | ICD-10-CM

## 2025-01-30 DIAGNOSIS — Z79.01 WARFARIN ANTICOAGULATION: ICD-10-CM

## 2025-01-30 LAB
INR PPP: 4.3 (ref 0.91–1.09)
PROTHROMBIN TIME: 52 SECONDS (ref 10–13.8)

## 2025-01-30 PROCEDURE — G0463 HOSPITAL OUTPT CLINIC VISIT: HCPCS

## 2025-01-30 PROCEDURE — 85610 PROTHROMBIN TIME: CPT

## 2025-01-30 NOTE — PROGRESS NOTES
Anticoagulation Clinic Progress Note    Anticoagulation Summary  As of 2025      INR goal:  2.5-3.5   TTR:  73.4% (6.3 y)   INR used for dosin.3 (2025)   Warfarin maintenance plan:  5 mg every Tue, Thu, Sat; 7.5 mg all other days   Weekly warfarin total:  45 mg   Plan last modified:  Nickie Landin RPH (2025)   Next INR check:  2025   Priority:  Maintenance   Target end date:  Indefinite    Indications    Status post mitral valve replacement [Z95.2]  Warfarin anticoagulation [Z79.01]                 Anticoagulation Episode Summary       INR check location:  --    Preferred lab:  --    Send INR reminders to:  [facility]    Comments:  --          Anticoagulation Care Providers       Provider Role Specialty Phone number    Lindsey Vargas MD Referring Cardiology [phone]            Clinic Interview:  Patient Findings     Positives:  Other complaints    Negatives:  Signs/symptoms of thrombosis, Signs/symptoms of bleeding,   Laboratory test error suspected, Change in health, Change in alcohol use,   Change in activity, Upcoming invasive procedure, Emergency department   visit, Upcoming dental procedure, Missed doses, Extra doses, Change in   medications, Change in diet/appetite, Hospital admission, Bruising      Clinical Outcomes     Negatives:  Major bleeding event, Thromboembolic event,   Anticoagulation-related hospital admission, Anticoagulation-related ED   visit, Anticoagulation-related fatality        INR History:      Latest Ref Rng & Units 10/11/2024     8:30 AM 2024     9:00 AM 1/10/2025    10:07 AM 1/10/2025    10:30 AM 2025    10:15 AM 2025    10:30 AM 2025    10:00 AM   Anticoagulation Monitoring   INR  3.2 3.5  8.04 1.3 2.4 4.3   INR Date  10/11/2024 2024  1/10/2025 2025 2025 2025   INR Goal  2.5-3.5 2.5-3.5  2.5-3.5 2.5-3.5 2.5-3.5 2.5-3.5   Trend  Same Same  Same Same Same Same   Last Week Total  45 mg 45 mg   45 mg 27.5 mg 30 mg 45 mg   Next Week Total  45 mg 45 mg  27.5 mg 47.5 mg 45 mg 42.5 mg   Sun  7.5 mg 7.5 mg  2.5 mg (1/12) - 7.5 mg 7.5 mg   Mon  7.5 mg 7.5 mg  - 10 mg (1/13) 7.5 mg 7.5 mg   Tue  5 mg 5 mg  - 5 mg 5 mg 5 mg   Wed  7.5 mg 7.5 mg  - 7.5 mg 7.5 mg 7.5 mg   Thu  5 mg 5 mg  - - 5 mg 2.5 mg (1/30); Otherwise 5 mg   Fri  7.5 mg 7.5 mg  Hold (1/10) - 7.5 mg 7.5 mg   Sat  5 mg 5 mg  Hold (1/11) - 5 mg 5 mg   Historical INR 0.90 - 1.10   8.04            Plan:  1. INR is Supratherapeutic today- see above in Anticoagulation Summary.  Will instruct Yana Lehman to Change their warfarin regimen- see above in Anticoagulation Summary.  patient is reporting a lot of stress (9.5 out of 10). Partial to 2.5 mg then continue, rck 2 weeks   2. Follow up in 2 weeks  3. Patient declines warfarin refills.  4. Verbal and written information provided. Patient expresses understanding and has no further questions at this time.    Nickie Landin [facility]

## 2025-02-17 ENCOUNTER — ANTICOAGULATION VISIT (OUTPATIENT)
Dept: PHARMACY | Facility: HOSPITAL | Age: 70
End: 2025-02-17
Payer: MEDICARE

## 2025-02-17 DIAGNOSIS — Z79.01 WARFARIN ANTICOAGULATION: ICD-10-CM

## 2025-02-17 DIAGNOSIS — Z95.2 STATUS POST MITRAL VALVE REPLACEMENT: Primary | ICD-10-CM

## 2025-02-17 LAB
INR PPP: 3.2 (ref 0.91–1.09)
PROTHROMBIN TIME: 38.4 SECONDS (ref 10–13.8)

## 2025-02-17 PROCEDURE — 85610 PROTHROMBIN TIME: CPT

## 2025-02-17 PROCEDURE — G0463 HOSPITAL OUTPT CLINIC VISIT: HCPCS

## 2025-02-17 NOTE — PROGRESS NOTES
Anticoagulation Clinic Progress Note    Anticoagulation Summary  As of 2/17/2025      INR goal:  2.5-3.5   TTR:  73.0% (6.4 y)   INR used for dosing:  3.2 (2/17/2025)   Warfarin maintenance plan:  5 mg every Tue, Thu, Sat; 7.5 mg all other days   Weekly warfarin total:  45 mg   No change documented:  Rajiv Leong, Pharmacy Technician   Plan last modified:  Nickie Landin RPH (1/30/2025)   Next INR check:  3/3/2025   Priority:  Maintenance   Target end date:  Indefinite    Indications    Status post mitral valve replacement [Z95.2]  Warfarin anticoagulation [Z79.01]                 Anticoagulation Episode Summary       INR check location:  --    Preferred lab:  --    Send INR reminders to:   [facility]    Comments:  --          Anticoagulation Care Providers       Provider Role Specialty Phone number    Lindsey Vargas MD Referring Cardiology [phone]            Clinic Interview:  Patient Findings     Negatives:  Signs/symptoms of thrombosis, Signs/symptoms of bleeding,   Laboratory test error suspected, Change in health, Change in alcohol use,   Change in activity, Upcoming invasive procedure, Emergency department   visit, Upcoming dental procedure, Missed doses, Extra doses, Change in   medications, Change in diet/appetite, Hospital admission, Bruising, Other   complaints      Clinical Outcomes     Negatives:  Major bleeding event, Thromboembolic event,   Anticoagulation-related hospital admission, Anticoagulation-related ED   visit, Anticoagulation-related fatality        INR History:      Latest Ref Rng & Units 11/26/2024     9:00 AM 1/10/2025    10:07 AM 1/10/2025    10:30 AM 1/13/2025    10:15 AM 1/16/2025    10:30 AM 1/30/2025    10:00 AM 2/17/2025     2:00 PM   Anticoagulation Monitoring   INR  3.5  8.04 1.3 2.4 4.3 3.2   INR Date  11/26/2024  1/10/2025 1/13/2025 1/16/2025 1/30/2025 2/17/2025   INR Goal  2.5-3.5  2.5-3.5 2.5-3.5 2.5-3.5 2.5-3.5 2.5-3.5   Trend  Same  Same Same  Same Same Same   Last Week Total  45 mg  45 mg 27.5 mg 30 mg 45 mg 45 mg   Next Week Total  45 mg  27.5 mg 47.5 mg 45 mg 42.5 mg 45 mg   Sun  7.5 mg  2.5 mg (1/12) - 7.5 mg 7.5 mg 7.5 mg   Mon  7.5 mg  - 10 mg (1/13) 7.5 mg 7.5 mg 7.5 mg   Tue  5 mg  - 5 mg 5 mg 5 mg 5 mg   Wed  7.5 mg  - 7.5 mg 7.5 mg 7.5 mg 7.5 mg   Thu  5 mg  - - 5 mg 2.5 mg (1/30); Otherwise 5 mg 5 mg   Fri  7.5 mg  Hold (1/10) - 7.5 mg 7.5 mg 7.5 mg   Sat  5 mg  Hold (1/11) - 5 mg 5 mg 5 mg   Historical INR 0.90 - 1.10  8.04             Plan:  1. INR is therapeutic today- see above in Anticoagulation Summary.   Will instruct Yana Lehman to continue their warfarin regimen- see above in Anticoagulation Summary.  2. Follow up in 2 weeks.  3. Patient declines warfarin refills.  4. Verbal and written information provided. Patient expresses understanding and has no further questions at this time.    Rajiv Leong, Pharmacy Technician

## 2025-02-17 NOTE — PROGRESS NOTES
I have supervised and reviewed the notes, assessments, and/or procedures performed. I personally performed the assessment and implemented the plan. I concur with the documentation of this patient encounter.    Nickie Landin, [facility]

## 2025-03-03 ENCOUNTER — ANTICOAGULATION VISIT (OUTPATIENT)
Dept: PHARMACY | Facility: HOSPITAL | Age: 70
End: 2025-03-03
Payer: MEDICARE

## 2025-03-03 DIAGNOSIS — Z79.01 WARFARIN ANTICOAGULATION: ICD-10-CM

## 2025-03-03 DIAGNOSIS — Z95.2 STATUS POST MITRAL VALVE REPLACEMENT: Primary | ICD-10-CM

## 2025-03-03 LAB
INR PPP: 2.7 (ref 0.91–1.09)
PROTHROMBIN TIME: 32.7 SECONDS (ref 10–13.8)

## 2025-03-03 PROCEDURE — 85610 PROTHROMBIN TIME: CPT

## 2025-03-03 PROCEDURE — G0463 HOSPITAL OUTPT CLINIC VISIT: HCPCS

## 2025-03-03 NOTE — PROGRESS NOTES
Anticoagulation Clinic Progress Note    Anticoagulation Summary  As of 3/3/2025      INR goal:  2.5-3.5   TTR:  73.2% (6.4 y)   INR used for dosin.7 (3/3/2025)   Warfarin maintenance plan:  5 mg every Tue, Thu, Sat; 7.5 mg all other days   Weekly warfarin total:  45 mg   No change documented:  Usama Navarro, Pharmacy Intern   Plan last modified:  Nickie Landin RPH (2025)   Next INR check:  3/31/2025   Priority:  Maintenance   Target end date:  Indefinite    Indications    Status post mitral valve replacement [Z95.2]  Warfarin anticoagulation [Z79.01]                 Anticoagulation Episode Summary       INR check location:  --    Preferred lab:  --    Send INR reminders to:   [facility]    Comments:  --          Anticoagulation Care Providers       Provider Role Specialty Phone number    Lindsey Vargas MD Referring Cardiology [phone]            Clinic Interview:  Patient Findings     Negatives:  Signs/symptoms of thrombosis, Signs/symptoms of bleeding,   Laboratory test error suspected, Change in health, Change in alcohol use,   Change in activity, Upcoming invasive procedure, Emergency department   visit, Upcoming dental procedure, Missed doses, Extra doses, Change in   medications, Change in diet/appetite, Hospital admission, Bruising, Other   complaints    Comments:  INR=2.7. No changes. Continue current dosing regimen (5 mg   TuThSa, 7.5 mg AODs). Recheck 4 weeks.       Clinical Outcomes     Negatives:  Major bleeding event, Thromboembolic event,   Anticoagulation-related hospital admission, Anticoagulation-related ED   visit, Anticoagulation-related fatality    Comments:  INR=2.7. No changes. Continue current dosing regimen (5 mg   TuThSa, 7.5 mg AODs). Recheck 4 weeks.         INR History:      Latest Ref Rng & Units 1/10/2025    10:07 AM 1/10/2025    10:30 AM 2025    10:15 AM 2025    10:30 AM 2025    10:00 AM 2025     2:00 PM 3/3/2025      2:00 PM   Anticoagulation Monitoring   INR   8.04 1.3 2.4 4.3 3.2 2.7   INR Date   1/10/2025 1/13/2025 1/16/2025 1/30/2025 2/17/2025 3/3/2025   INR Goal   2.5-3.5 2.5-3.5 2.5-3.5 2.5-3.5 2.5-3.5 2.5-3.5   Trend   Same Same Same Same Same Same   Last Week Total   45 mg 27.5 mg 30 mg 45 mg 45 mg 45 mg   Next Week Total   27.5 mg 47.5 mg 45 mg 42.5 mg 45 mg 45 mg   Sun   2.5 mg (1/12) - 7.5 mg 7.5 mg 7.5 mg 7.5 mg   Mon   - 10 mg (1/13) 7.5 mg 7.5 mg 7.5 mg 7.5 mg   Tue   - 5 mg 5 mg 5 mg 5 mg 5 mg   Wed   - 7.5 mg 7.5 mg 7.5 mg 7.5 mg 7.5 mg   Thu   - - 5 mg 2.5 mg (1/30); Otherwise 5 mg 5 mg 5 mg   Fri   Hold (1/10) - 7.5 mg 7.5 mg 7.5 mg 7.5 mg   Sat   Hold (1/11) - 5 mg 5 mg 5 mg 5 mg   Historical INR 0.90 - 1.10 8.04              Plan:  1. INR is Therapeutic today- see above in Anticoagulation Summary.  Will instruct Yana Lehman to Continue their warfarin regimen (5 mg TuThSa, 7.5 mg AODs) - see above in Anticoagulation Summary.  2. Follow up in 4 weeks  3. Patient declines warfarin refills.  4. Verbal and written information provided. Patient expresses understanding and has no further questions at this time.    [place], Pharmacy Intern

## 2025-03-03 NOTE — PROGRESS NOTES
I have supervised and reviewed the notes, assessments, and/or procedures performed. The documented assessment and plan were developed cooperatively, and the plan was implemented in my presence. I concur with the documentation of this patient encounter.    Nickie Landin, [facility]

## 2025-03-31 ENCOUNTER — ANTICOAGULATION VISIT (OUTPATIENT)
Dept: PHARMACY | Facility: HOSPITAL | Age: 70
End: 2025-03-31
Payer: MEDICARE

## 2025-03-31 DIAGNOSIS — Z95.2 STATUS POST MITRAL VALVE REPLACEMENT: Primary | ICD-10-CM

## 2025-03-31 DIAGNOSIS — Z79.01 WARFARIN ANTICOAGULATION: ICD-10-CM

## 2025-03-31 LAB
INR PPP: 3.1 (ref 0.91–1.09)
PROTHROMBIN TIME: 37.5 SECONDS (ref 10–13.8)

## 2025-03-31 PROCEDURE — G0463 HOSPITAL OUTPT CLINIC VISIT: HCPCS

## 2025-03-31 PROCEDURE — 85610 PROTHROMBIN TIME: CPT

## 2025-03-31 NOTE — PROGRESS NOTES
I have supervised and reviewed the notes, assessments, and/or procedures performed. I personally performed the assessment and implemented the plan. I concur with the documentation of this patient encounter.    Nickie Landin, [facility]

## 2025-03-31 NOTE — PROGRESS NOTES
Anticoagulation Clinic Progress Note    Anticoagulation Summary  As of 3/31/2025      INR goal:  2.5-3.5   TTR:  73.5% (6.5 y)   INR used for dosing:  3.1 (3/31/2025)   Warfarin maintenance plan:  5 mg every Tue, Thu, Sat; 7.5 mg all other days   Weekly warfarin total:  45 mg   No change documented:  Emely Greene   Plan last modified:  Nickie Landin RPH (1/30/2025)   Next INR check:  4/28/2025   Priority:  Maintenance   Target end date:  Indefinite    Indications    Status post mitral valve replacement [Z95.2]  Warfarin anticoagulation [Z79.01]                 Anticoagulation Episode Summary       INR check location:  --    Preferred lab:  --    Send INR reminders to:   [facility] [place]    Comments:  --          Anticoagulation Care Providers       Provider Role Specialty Phone number    Lindsey Vargas MD Referring Cardiology [phone]            Clinic Interview:  Patient Findings     Negatives:  Signs/symptoms of thrombosis, Signs/symptoms of bleeding,   Laboratory test error suspected, Change in health, Change in alcohol use,   Change in activity, Upcoming invasive procedure, Emergency department   visit, Upcoming dental procedure, Missed doses, Extra doses, Change in   medications, Change in diet/appetite, Hospital admission, Bruising, Other   complaints      Clinical Outcomes     Negatives:  Major bleeding event, Thromboembolic event,   Anticoagulation-related hospital admission, Anticoagulation-related ED   visit, Anticoagulation-related fatality        INR History:      1/10/2025    10:30 AM 1/13/2025    10:15 AM 1/16/2025    10:30 AM 1/30/2025    10:00 AM 2/17/2025     2:00 PM 3/3/2025     2:00 PM 3/31/2025     2:00 PM   Anticoagulation Monitoring   INR 8.04 1.3 2.4 4.3 3.2 2.7 3.1   INR Date 1/10/2025 1/13/2025 1/16/2025 1/30/2025 2/17/2025 3/3/2025 3/31/2025   INR Goal 2.5-3.5 2.5-3.5 2.5-3.5 2.5-3.5 2.5-3.5 2.5-3.5 2.5-3.5   Trend Same Same Same Same Same Same Same    Last Week Total 45 mg 27.5 mg 30 mg 45 mg 45 mg 45 mg 45 mg   Next Week Total 27.5 mg 47.5 mg 45 mg 42.5 mg 45 mg 45 mg 45 mg   Sun 2.5 mg (1/12) - 7.5 mg 7.5 mg 7.5 mg 7.5 mg 7.5 mg   Mon - 10 mg (1/13) 7.5 mg 7.5 mg 7.5 mg 7.5 mg 7.5 mg   Tue - 5 mg 5 mg 5 mg 5 mg 5 mg 5 mg   Wed - 7.5 mg 7.5 mg 7.5 mg 7.5 mg 7.5 mg 7.5 mg   Thu - - 5 mg 2.5 mg (1/30); Otherwise 5 mg 5 mg 5 mg 5 mg   Fri Hold (1/10) - 7.5 mg 7.5 mg 7.5 mg 7.5 mg 7.5 mg   Sat Hold (1/11) - 5 mg 5 mg 5 mg 5 mg 5 mg       Plan:  1. INR is therapeutic today- see above in Anticoagulation Summary.   Will instruct Yana Lehman to continue their warfarin regimen- see above in Anticoagulation Summary.  2. Follow up in 4 weeks.  3. Patient declines warfarin refills.  4. Verbal and written information provided. Patient expresses understanding and has no further questions at this time.    Emely Greene

## 2025-04-28 ENCOUNTER — ANTICOAGULATION VISIT (OUTPATIENT)
Dept: PHARMACY | Facility: HOSPITAL | Age: 70
End: 2025-04-28
Payer: MEDICARE

## 2025-04-28 DIAGNOSIS — Z79.01 WARFARIN ANTICOAGULATION: ICD-10-CM

## 2025-04-28 DIAGNOSIS — Z95.2 STATUS POST MITRAL VALVE REPLACEMENT: Primary | ICD-10-CM

## 2025-04-28 LAB
INR PPP: 3.1 (ref 0.91–1.09)
PROTHROMBIN TIME: 37.4 SECONDS (ref 10–13.8)

## 2025-04-28 PROCEDURE — G0463 HOSPITAL OUTPT CLINIC VISIT: HCPCS

## 2025-04-28 PROCEDURE — 85610 PROTHROMBIN TIME: CPT

## 2025-04-28 NOTE — PROGRESS NOTES
Anticoagulation Clinic Progress Note    Anticoagulation Summary  As of 4/28/2025      INR goal:  2.5-3.5   TTR:  73.8% (6.6 y)   INR used for dosing:  3.1 (4/28/2025)   Warfarin maintenance plan:  5 mg every Tue, Thu, Sat; 7.5 mg all other days   Weekly warfarin total:  45 mg   No change documented:  Grady Salazar, PharmD   Plan last modified:  Nickie Landin RPH (1/30/2025)   Next INR check:  6/9/2025   Priority:  Maintenance   Target end date:  Indefinite    Indications    Status post mitral valve replacement [Z95.2]  Warfarin anticoagulation [Z79.01]                 Anticoagulation Episode Summary       INR check location:  --    Preferred lab:  --    Send INR reminders to:   [facility]    Comments:  --          Anticoagulation Care Providers       Provider Role Specialty Phone number    Lindsey Vargas MD Referring Cardiology [phone]            Clinic Interview:  Patient Findings     Negatives:  Signs/symptoms of thrombosis, Signs/symptoms of bleeding,   Laboratory test error suspected, Change in health, Change in alcohol use,   Change in activity, Upcoming invasive procedure, Emergency department   visit, Upcoming dental procedure, Missed doses, Extra doses, Change in   medications, Change in diet/appetite, Hospital admission, Bruising, Other   complaints      Clinical Outcomes     Negatives:  Major bleeding event, Thromboembolic event,   Anticoagulation-related hospital admission, Anticoagulation-related ED   visit, Anticoagulation-related fatality        INR History:      1/13/2025    10:15 AM 1/16/2025    10:30 AM 1/30/2025    10:00 AM 2/17/2025     2:00 PM 3/3/2025     2:00 PM 3/31/2025     2:00 PM 4/28/2025     2:00 PM   Anticoagulation Monitoring   INR 1.3 2.4 4.3 3.2 2.7 3.1 3.1   INR Date 1/13/2025 1/16/2025 1/30/2025 2/17/2025 3/3/2025 3/31/2025 4/28/2025   INR Goal 2.5-3.5 2.5-3.5 2.5-3.5 2.5-3.5 2.5-3.5 2.5-3.5 2.5-3.5   Trend Same Same Same Same Same Same Same   Last  Week Total 27.5 mg 30 mg 45 mg 45 mg 45 mg 45 mg 45 mg   Next Week Total 47.5 mg 45 mg 42.5 mg 45 mg 45 mg 45 mg 45 mg   Sun - 7.5 mg 7.5 mg 7.5 mg 7.5 mg 7.5 mg 7.5 mg   Mon 10 mg (1/13) 7.5 mg 7.5 mg 7.5 mg 7.5 mg 7.5 mg 7.5 mg   Tue 5 mg 5 mg 5 mg 5 mg 5 mg 5 mg 5 mg   Wed 7.5 mg 7.5 mg 7.5 mg 7.5 mg 7.5 mg 7.5 mg 7.5 mg   Thu - 5 mg 2.5 mg (1/30); Otherwise 5 mg 5 mg 5 mg 5 mg 5 mg   Fri - 7.5 mg 7.5 mg 7.5 mg 7.5 mg 7.5 mg 7.5 mg   Sat - 5 mg 5 mg 5 mg 5 mg 5 mg 5 mg       Plan:  1. INR is Therapeutic today- see above in Anticoagulation Summary.  Will instruct Yana Lehman to Continue their warfarin regimen- see above in Anticoagulation Summary.  2. Follow up in 6 weeks  3. Patient declines warfarin refills.  4. Verbal and written information provided. Patient expresses understanding and has no further questions at this time.    Grady Salazar, PharmD

## 2025-06-09 ENCOUNTER — ANTICOAGULATION VISIT (OUTPATIENT)
Dept: PHARMACY | Facility: HOSPITAL | Age: 70
End: 2025-06-09
Payer: MEDICARE

## 2025-06-09 DIAGNOSIS — Z95.2 STATUS POST MITRAL VALVE REPLACEMENT: Primary | ICD-10-CM

## 2025-06-09 DIAGNOSIS — Z79.01 WARFARIN ANTICOAGULATION: ICD-10-CM

## 2025-06-09 LAB
INR PPP: 3.2 (ref 0.91–1.09)
PROTHROMBIN TIME: 38.3 SECONDS (ref 10–13.8)

## 2025-06-09 PROCEDURE — 85610 PROTHROMBIN TIME: CPT

## 2025-06-09 PROCEDURE — G0463 HOSPITAL OUTPT CLINIC VISIT: HCPCS

## 2025-06-09 NOTE — PROGRESS NOTES
Anticoagulation Clinic Progress Note    Anticoagulation Summary  As of 6/9/2025      INR goal:  2.5-3.5   TTR:  74.3% (6.7 y)   INR used for dosing:  3.2 (6/9/2025)   Warfarin maintenance plan:  5 mg every Tue, Thu, Sat; 7.5 mg all other days   Weekly warfarin total:  45 mg   No change documented:  Frances Giraldo   Plan last modified:  Nickie Landin RPH (1/30/2025)   Next INR check:  7/21/2025   Priority:  Maintenance   Target end date:  Indefinite    Indications    Status post mitral valve replacement [Z95.2]  Warfarin anticoagulation [Z79.01]                 Anticoagulation Episode Summary       INR check location:  --    Preferred lab:  --    Send INR reminders to:  [facility]    Comments:  --          Anticoagulation Care Providers       Provider Role Specialty Phone number    Lindsey Vargas MD Referring Cardiology [phone]            Clinic Interview:  Patient Findings     Negatives:  Signs/symptoms of thrombosis, Signs/symptoms of bleeding,   Laboratory test error suspected, Change in health, Change in alcohol use,   Change in activity, Upcoming invasive procedure, Emergency department   visit, Upcoming dental procedure, Missed doses, Extra doses, Change in   medications, Change in diet/appetite, Hospital admission, Bruising, Other   complaints    Comments:  Pt reports no changes      Clinical Outcomes     Negatives:  Major bleeding event, Thromboembolic event,   Anticoagulation-related hospital admission, Anticoagulation-related ED   visit, Anticoagulation-related fatality    Comments:  Pt reports no changes        INR History:      1/16/2025    10:30 AM 1/30/2025    10:00 AM 2/17/2025     2:00 PM 3/3/2025     2:00 PM 3/31/2025     2:00 PM 4/28/2025     2:00 PM 6/9/2025     2:00 PM   Anticoagulation Monitoring   INR 2.4 4.3 3.2 2.7 3.1 3.1 3.2   INR Date 1/16/2025 1/30/2025 2/17/2025 3/3/2025 3/31/2025 4/28/2025 6/9/2025   INR Goal 2.5-3.5 2.5-3.5 2.5-3.5 2.5-3.5 2.5-3.5  2.5-3.5 2.5-3.5   Trend Same Same Same Same Same Same Same   Last Week Total 30 mg 45 mg 45 mg 45 mg 45 mg 45 mg 45 mg   Next Week Total 45 mg 42.5 mg 45 mg 45 mg 45 mg 45 mg 45 mg   Sun 7.5 mg 7.5 mg 7.5 mg 7.5 mg 7.5 mg 7.5 mg 7.5 mg   Mon 7.5 mg 7.5 mg 7.5 mg 7.5 mg 7.5 mg 7.5 mg 7.5 mg   Tue 5 mg 5 mg 5 mg 5 mg 5 mg 5 mg 5 mg   Wed 7.5 mg 7.5 mg 7.5 mg 7.5 mg 7.5 mg 7.5 mg 7.5 mg   Thu 5 mg 2.5 mg (1/30); Otherwise 5 mg 5 mg 5 mg 5 mg 5 mg 5 mg   Fri 7.5 mg 7.5 mg 7.5 mg 7.5 mg 7.5 mg 7.5 mg 7.5 mg   Sat 5 mg 5 mg 5 mg 5 mg 5 mg 5 mg 5 mg       Plan:  1. INR is Therapeutic today- see above in Anticoagulation Summary.  Will instruct Yana Lehman to Continue their warfarin regimen- see above in Anticoagulation Summary.  2. Follow up in 6 weeks  3. Patient declines warfarin refills.  4. Verbal and written information provided. Patient expresses understanding and has no further questions at this time.    Frances Giraldo

## 2025-06-09 NOTE — PROGRESS NOTES
I have supervised and reviewed the notes, assessments, and/or procedures performed. The documented assessment and plan were developed cooperatively, and the plan was implemented in my presence. I concur with the documentation of this patient encounter.    Grady Salazar, PharmD

## 2025-06-24 RX ORDER — WARFARIN SODIUM 5 MG/1
TABLET ORAL
Qty: 120 TABLET | Refills: 1 | Status: SHIPPED | OUTPATIENT
Start: 2025-06-24

## 2025-07-23 ENCOUNTER — ANTICOAGULATION VISIT (OUTPATIENT)
Dept: PHARMACY | Facility: HOSPITAL | Age: 70
End: 2025-07-23
Payer: MEDICARE

## 2025-07-23 DIAGNOSIS — Z95.2 STATUS POST MITRAL VALVE REPLACEMENT: Primary | ICD-10-CM

## 2025-07-23 DIAGNOSIS — Z79.01 WARFARIN ANTICOAGULATION: ICD-10-CM

## 2025-07-23 LAB
INR PPP: 3.3 (ref 0.91–1.09)
PROTHROMBIN TIME: 40 SECONDS (ref 10–13.8)

## 2025-07-23 PROCEDURE — G0463 HOSPITAL OUTPT CLINIC VISIT: HCPCS

## 2025-07-23 PROCEDURE — 85610 PROTHROMBIN TIME: CPT

## 2025-07-23 NOTE — PROGRESS NOTES
Anticoagulation Clinic Progress Note    Anticoagulation Summary  As of 7/23/2025      INR goal:  2.5-3.5   TTR:  74.7% (6.8 y)   INR used for dosing:  3.3 (7/23/2025)   Warfarin maintenance plan:  5 mg every Tue, Thu, Sat; 7.5 mg all other days   Weekly warfarin total:  45 mg   No change documented:  Rajiv Leong, Pharmacy Technician   Plan last modified:  Nickie Landin RPH (1/30/2025)   Next INR check:  9/3/2025   Priority:  Maintenance   Target end date:  Indefinite    Indications    Status post mitral valve replacement [Z95.2]  Warfarin anticoagulation [Z79.01]                 Anticoagulation Episode Summary       INR check location:  --    Preferred lab:  --    Send INR reminders to:   [facility]    Comments:  --          Anticoagulation Care Providers       Provider Role Specialty Phone number    Lindsey Vargas MD Referring Cardiology [phone]            Clinic Interview:  Patient Findings     Negatives:  Signs/symptoms of thrombosis, Signs/symptoms of bleeding,   Laboratory test error suspected, Change in health, Change in alcohol use,   Change in activity, Upcoming invasive procedure, Emergency department   visit, Upcoming dental procedure, Missed doses, Extra doses, Change in   medications, Change in diet/appetite, Hospital admission, Bruising, Other   complaints      Clinical Outcomes     Negatives:  Major bleeding event, Thromboembolic event,   Anticoagulation-related hospital admission, Anticoagulation-related ED   visit, Anticoagulation-related fatality        INR History:      1/30/2025    10:00 AM 2/17/2025     2:00 PM 3/3/2025     2:00 PM 3/31/2025     2:00 PM 4/28/2025     2:00 PM 6/9/2025     2:00 PM 7/23/2025     1:00 PM   Anticoagulation Monitoring   INR 4.3 3.2 2.7 3.1 3.1 3.2 3.3   INR Date 1/30/2025 2/17/2025 3/3/2025 3/31/2025 4/28/2025 6/9/2025 7/23/2025   INR Goal 2.5-3.5 2.5-3.5 2.5-3.5 2.5-3.5 2.5-3.5 2.5-3.5 2.5-3.5   Trend Same Same Same Same Same Same  Same   Last Week Total 45 mg 45 mg 45 mg 45 mg 45 mg 45 mg 45 mg   Next Week Total 42.5 mg 45 mg 45 mg 45 mg 45 mg 45 mg 45 mg   Sun 7.5 mg 7.5 mg 7.5 mg 7.5 mg 7.5 mg 7.5 mg 7.5 mg   Mon 7.5 mg 7.5 mg 7.5 mg 7.5 mg 7.5 mg 7.5 mg 7.5 mg   Tue 5 mg 5 mg 5 mg 5 mg 5 mg 5 mg 5 mg   Wed 7.5 mg 7.5 mg 7.5 mg 7.5 mg 7.5 mg 7.5 mg 7.5 mg   Thu 2.5 mg (1/30); Otherwise 5 mg 5 mg 5 mg 5 mg 5 mg 5 mg 5 mg   Fri 7.5 mg 7.5 mg 7.5 mg 7.5 mg 7.5 mg 7.5 mg 7.5 mg   Sat 5 mg 5 mg 5 mg 5 mg 5 mg 5 mg 5 mg       Plan:  1. INR is therapeutic today- see above in Anticoagulation Summary.   Will instruct Yana Lehman to continue their warfarin regimen- see above in Anticoagulation Summary.  2. Follow up in 6 weeks.  3. Patient declines warfarin refills.  4. Verbal and written information provided. Patient expresses understanding and has no further questions at this time.    Rajiv Leong, Pharmacy Technician

## 2025-07-23 NOTE — PROGRESS NOTES
I have supervised and reviewed the notes, assessments, and/or procedures performed. I personally performed the assessment and implemented the plan. I concur with the documentation of this patient encounter.    Grady Salazar, PharmD